# Patient Record
Sex: FEMALE | Race: WHITE | NOT HISPANIC OR LATINO | Employment: FULL TIME | URBAN - NONMETROPOLITAN AREA
[De-identification: names, ages, dates, MRNs, and addresses within clinical notes are randomized per-mention and may not be internally consistent; named-entity substitution may affect disease eponyms.]

---
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
## 2019-01-08 ENCOUNTER — TRANSCRIBE ORDERS (OUTPATIENT)
Dept: ADMINISTRATIVE | Facility: HOSPITAL | Age: 19
End: 2019-01-08

## 2019-01-08 ENCOUNTER — APPOINTMENT (OUTPATIENT)
Dept: LAB | Facility: HOSPITAL | Age: 19
End: 2019-01-08
Payer: COMMERCIAL

## 2019-01-08 DIAGNOSIS — D69.6 THROMBOCYTOPENIA, UNSPECIFIED (HCC): ICD-10-CM

## 2019-01-08 DIAGNOSIS — T50.905A DRUG-INDUCED IMMUNE THROMBOCYTOPENIA: ICD-10-CM

## 2019-01-08 DIAGNOSIS — T50.905A DRUG-INDUCED IMMUNE THROMBOCYTOPENIA: Primary | ICD-10-CM

## 2019-01-08 DIAGNOSIS — D69.6 GESTATIONAL THROMBOCYTOPENIA, SECOND TRIMESTER (HCC): ICD-10-CM

## 2019-01-08 DIAGNOSIS — D69.59 DRUG-INDUCED IMMUNE THROMBOCYTOPENIA: Primary | ICD-10-CM

## 2019-01-08 DIAGNOSIS — O99.112 GESTATIONAL THROMBOCYTOPENIA, SECOND TRIMESTER (HCC): ICD-10-CM

## 2019-01-08 DIAGNOSIS — T50.905A ACIDIFYING AGENTS CAUSING ADVERSE EFFECT IN THERAPEUTIC USE: ICD-10-CM

## 2019-01-08 DIAGNOSIS — D69.59 DRUG-INDUCED IMMUNE THROMBOCYTOPENIA: ICD-10-CM

## 2019-01-08 LAB
BASOPHILS # BLD AUTO: 0.04 THOUSANDS/ΜL (ref 0–0.1)
BASOPHILS NFR BLD AUTO: 0 % (ref 0–1)
EOSINOPHIL # BLD AUTO: 0.14 THOUSAND/ΜL (ref 0–0.61)
EOSINOPHIL NFR BLD AUTO: 1 % (ref 0–6)
ERYTHROCYTE [DISTWIDTH] IN BLOOD BY AUTOMATED COUNT: 12.6 % (ref 11.6–15.1)
HCT VFR BLD AUTO: 36.2 % (ref 34.8–46.1)
HGB BLD-MCNC: 12.1 G/DL (ref 11.5–15.4)
IMM GRANULOCYTES # BLD AUTO: 0.08 THOUSAND/UL (ref 0–0.2)
IMM GRANULOCYTES NFR BLD AUTO: 1 % (ref 0–2)
LYMPHOCYTES # BLD AUTO: 2.63 THOUSANDS/ΜL (ref 0.6–4.47)
LYMPHOCYTES NFR BLD AUTO: 19 % (ref 14–44)
MCH RBC QN AUTO: 28.7 PG (ref 26.8–34.3)
MCHC RBC AUTO-ENTMCNC: 33.4 G/DL (ref 31.4–37.4)
MCV RBC AUTO: 86 FL (ref 82–98)
MONOCYTES # BLD AUTO: 0.81 THOUSAND/ΜL (ref 0.17–1.22)
MONOCYTES NFR BLD AUTO: 6 % (ref 4–12)
NEUTROPHILS # BLD AUTO: 10.09 THOUSANDS/ΜL (ref 1.85–7.62)
NEUTS SEG NFR BLD AUTO: 73 % (ref 43–75)
NRBC BLD AUTO-RTO: 0 /100 WBCS
PLATELET # BLD AUTO: 122 THOUSANDS/UL (ref 149–390)
PMV BLD AUTO: 12.6 FL (ref 8.9–12.7)
RBC # BLD AUTO: 4.22 MILLION/UL (ref 3.81–5.12)
WBC # BLD AUTO: 13.79 THOUSAND/UL (ref 4.31–10.16)

## 2019-01-08 PROCEDURE — 85025 COMPLETE CBC W/AUTO DIFF WBC: CPT

## 2019-01-08 PROCEDURE — 36415 COLL VENOUS BLD VENIPUNCTURE: CPT

## 2019-01-24 ENCOUNTER — HOSPITAL ENCOUNTER (EMERGENCY)
Facility: HOSPITAL | Age: 19
Discharge: HOME | End: 2019-01-24
Attending: EMERGENCY MEDICINE | Admitting: EMERGENCY MEDICINE
Payer: COMMERCIAL

## 2019-01-24 ENCOUNTER — APPOINTMENT (EMERGENCY)
Dept: ULTRASOUND IMAGING | Facility: HOSPITAL | Age: 19
End: 2019-01-24
Payer: COMMERCIAL

## 2019-01-24 VITALS
SYSTOLIC BLOOD PRESSURE: 140 MMHG | OXYGEN SATURATION: 98 % | DIASTOLIC BLOOD PRESSURE: 87 MMHG | HEIGHT: 64 IN | HEART RATE: 97 BPM | TEMPERATURE: 98.5 F | WEIGHT: 243.83 LBS | RESPIRATION RATE: 18 BRPM | BODY MASS INDEX: 41.63 KG/M2

## 2019-01-24 DIAGNOSIS — R10.9 ABDOMINAL PAIN: ICD-10-CM

## 2019-01-24 DIAGNOSIS — N39.0 UTI (URINARY TRACT INFECTION): Primary | ICD-10-CM

## 2019-01-24 LAB
ALBUMIN SERPL BCP-MCNC: 3 G/DL (ref 3.5–5)
ALP SERPL-CCNC: 99 U/L (ref 46–384)
ALT SERPL W P-5'-P-CCNC: 16 U/L (ref 12–78)
AMORPH PHOS CRY URNS QL MICRO: ABNORMAL /HPF
ANION GAP SERPL CALCULATED.3IONS-SCNC: 11 MMOL/L (ref 4–13)
APTT PPP: 30 SECONDS (ref 26–38)
AST SERPL W P-5'-P-CCNC: 8 U/L (ref 5–45)
ATRIAL RATE: 89 BPM
BACTERIA UR QL AUTO: ABNORMAL /HPF
BASOPHILS # BLD AUTO: 0.02 THOUSANDS/ΜL (ref 0–0.1)
BASOPHILS NFR BLD AUTO: 0 % (ref 0–1)
BILIRUB SERPL-MCNC: 0.2 MG/DL (ref 0.2–1)
BILIRUB UR QL STRIP: NEGATIVE
BUN SERPL-MCNC: 8 MG/DL (ref 5–25)
CALCIUM SERPL-MCNC: 8.7 MG/DL (ref 8.3–10.1)
CHLORIDE SERPL-SCNC: 102 MMOL/L (ref 100–108)
CLARITY UR: ABNORMAL
CO2 SERPL-SCNC: 26 MMOL/L (ref 21–32)
COLOR UR: YELLOW
CREAT SERPL-MCNC: 0.52 MG/DL (ref 0.6–1.3)
EOSINOPHIL # BLD AUTO: 0.11 THOUSAND/ΜL (ref 0–0.61)
EOSINOPHIL NFR BLD AUTO: 1 % (ref 0–6)
ERYTHROCYTE [DISTWIDTH] IN BLOOD BY AUTOMATED COUNT: 12.9 % (ref 11.6–15.1)
GFR SERPL CREATININE-BSD FRML MDRD: 139 ML/MIN/1.73SQ M
GLUCOSE SERPL-MCNC: 85 MG/DL (ref 65–140)
GLUCOSE UR STRIP-MCNC: NEGATIVE MG/DL
HCT VFR BLD AUTO: 38.3 % (ref 34.8–46.1)
HGB BLD-MCNC: 12.6 G/DL (ref 11.5–15.4)
HGB UR QL STRIP.AUTO: ABNORMAL
IMM GRANULOCYTES # BLD AUTO: 0.06 THOUSAND/UL (ref 0–0.2)
IMM GRANULOCYTES NFR BLD AUTO: 1 % (ref 0–2)
INR PPP: 1.08 (ref 0.86–1.17)
KETONES UR STRIP-MCNC: NEGATIVE MG/DL
LACTATE SERPL-SCNC: 0.9 MMOL/L (ref 0.5–2)
LEUKOCYTE ESTERASE UR QL STRIP: ABNORMAL
LIPASE SERPL-CCNC: 179 U/L (ref 73–393)
LYMPHOCYTES # BLD AUTO: 2.03 THOUSANDS/ΜL (ref 0.6–4.47)
LYMPHOCYTES NFR BLD AUTO: 18 % (ref 14–44)
MAGNESIUM SERPL-MCNC: 1.7 MG/DL (ref 1.6–2.6)
MCH RBC QN AUTO: 28.3 PG (ref 26.8–34.3)
MCHC RBC AUTO-ENTMCNC: 32.9 G/DL (ref 31.4–37.4)
MCV RBC AUTO: 86 FL (ref 82–98)
MONOCYTES # BLD AUTO: 0.55 THOUSAND/ΜL (ref 0.17–1.22)
MONOCYTES NFR BLD AUTO: 5 % (ref 4–12)
NEUTROPHILS # BLD AUTO: 8.32 THOUSANDS/ΜL (ref 1.85–7.62)
NEUTS SEG NFR BLD AUTO: 75 % (ref 43–75)
NITRITE UR QL STRIP: NEGATIVE
NON-SQ EPI CELLS URNS QL MICRO: ABNORMAL /HPF
NRBC BLD AUTO-RTO: 0 /100 WBCS
P AXIS: 42 DEGREES
PH UR STRIP.AUTO: 7 [PH] (ref 4.5–8)
PLATELET # BLD AUTO: 126 THOUSANDS/UL (ref 149–390)
PMV BLD AUTO: 12.9 FL (ref 8.9–12.7)
POTASSIUM SERPL-SCNC: 3.7 MMOL/L (ref 3.5–5.3)
PR INTERVAL: 140 MS
PROT SERPL-MCNC: 6.8 G/DL (ref 6.4–8.2)
PROT UR STRIP-MCNC: NEGATIVE MG/DL
PROTHROMBIN TIME: 13.5 SECONDS (ref 11.8–14.2)
QRS AXIS: 2 DEGREES
QRSD INTERVAL: 100 MS
QT INTERVAL: 384 MS
QTC INTERVAL: 467 MS
RBC # BLD AUTO: 4.45 MILLION/UL (ref 3.81–5.12)
RBC #/AREA URNS AUTO: ABNORMAL /HPF
SODIUM SERPL-SCNC: 139 MMOL/L (ref 136–145)
SP GR UR STRIP.AUTO: 1.02 (ref 1–1.03)
T WAVE AXIS: 21 DEGREES
TROPONIN I SERPL-MCNC: <0.02 NG/ML
UROBILINOGEN UR QL STRIP.AUTO: 0.2 E.U./DL
VENTRICULAR RATE: 89 BPM
WBC # BLD AUTO: 11.09 THOUSAND/UL (ref 4.31–10.16)
WBC #/AREA URNS AUTO: ABNORMAL /HPF

## 2019-01-24 PROCEDURE — 93010 ELECTROCARDIOGRAM REPORT: CPT | Performed by: INTERNAL MEDICINE

## 2019-01-24 PROCEDURE — 80053 COMPREHEN METABOLIC PANEL: CPT | Performed by: PHYSICIAN ASSISTANT

## 2019-01-24 PROCEDURE — 99284 EMERGENCY DEPT VISIT MOD MDM: CPT

## 2019-01-24 PROCEDURE — 83735 ASSAY OF MAGNESIUM: CPT | Performed by: PHYSICIAN ASSISTANT

## 2019-01-24 PROCEDURE — 83690 ASSAY OF LIPASE: CPT | Performed by: PHYSICIAN ASSISTANT

## 2019-01-24 PROCEDURE — 85025 COMPLETE CBC W/AUTO DIFF WBC: CPT | Performed by: PHYSICIAN ASSISTANT

## 2019-01-24 PROCEDURE — 84484 ASSAY OF TROPONIN QUANT: CPT | Performed by: PHYSICIAN ASSISTANT

## 2019-01-24 PROCEDURE — 85610 PROTHROMBIN TIME: CPT | Performed by: PHYSICIAN ASSISTANT

## 2019-01-24 PROCEDURE — 93005 ELECTROCARDIOGRAM TRACING: CPT

## 2019-01-24 PROCEDURE — 76705 ECHO EXAM OF ABDOMEN: CPT

## 2019-01-24 PROCEDURE — 83605 ASSAY OF LACTIC ACID: CPT | Performed by: PHYSICIAN ASSISTANT

## 2019-01-24 PROCEDURE — 81001 URINALYSIS AUTO W/SCOPE: CPT | Performed by: PHYSICIAN ASSISTANT

## 2019-01-24 PROCEDURE — 85730 THROMBOPLASTIN TIME PARTIAL: CPT | Performed by: PHYSICIAN ASSISTANT

## 2019-01-24 RX ORDER — MELATONIN
1000 DAILY
COMMUNITY

## 2019-01-24 RX ORDER — CEPHALEXIN 500 MG/1
500 CAPSULE ORAL 4 TIMES DAILY
Qty: 28 CAPSULE | Refills: 0 | Status: SHIPPED | OUTPATIENT
Start: 2019-01-24 | End: 2019-01-31

## 2019-01-24 RX ORDER — FERROUS SULFATE 325(65) MG
325 TABLET ORAL
COMMUNITY

## 2019-01-24 RX ORDER — ACETAMINOPHEN 325 MG/1
650 TABLET ORAL ONCE
Status: COMPLETED | OUTPATIENT
Start: 2019-01-24 | End: 2019-01-24

## 2019-01-24 RX ORDER — SODIUM CHLORIDE 9 MG/ML
125 INJECTION, SOLUTION INTRAVENOUS CONTINUOUS
Status: DISCONTINUED | OUTPATIENT
Start: 2019-01-24 | End: 2019-01-24 | Stop reason: HOSPADM

## 2019-01-24 RX ORDER — CEPHALEXIN 250 MG/1
500 CAPSULE ORAL ONCE
Status: COMPLETED | OUTPATIENT
Start: 2019-01-24 | End: 2019-01-24

## 2019-01-24 RX ADMIN — ACETAMINOPHEN 650 MG: 325 TABLET, FILM COATED ORAL at 17:34

## 2019-01-24 RX ADMIN — SODIUM CHLORIDE 1000 ML: 0.9 INJECTION, SOLUTION INTRAVENOUS at 18:38

## 2019-01-24 RX ADMIN — SODIUM CHLORIDE 125 ML/HR: 0.9 INJECTION, SOLUTION INTRAVENOUS at 17:34

## 2019-01-24 RX ADMIN — CEPHALEXIN 500 MG: 250 CAPSULE ORAL at 18:44

## 2019-01-24 NOTE — DISCHARGE INSTRUCTIONS

## 2019-01-24 NOTE — ED PROCEDURE NOTE
Procedure  ECG 12 Lead Documentation  Date/Time: 1/24/2019 5:29 PM  Performed by: Gilmer Cogan  Authorized by: Yashira HOPKINS     Indications / Diagnosis:  Abd pain  ECG reviewed by me, the ED Provider: yes    Patient location:  ED  Previous ECG:     Comparison to cardiac monitor: Yes    Interpretation:     Interpretation: normal    Rate:     ECG rate:  89    ECG rate assessment: normal    Rhythm:     Rhythm: sinus rhythm    Ectopy:     Ectopy: none    QRS:     QRS axis:  Normal    QRS intervals:  Normal  Conduction:     Conduction: normal    ST segments:     ST segments:  Normal  T waves:     T waves: normal                       Alma Orourke PA-C  01/24/19 1729

## 2019-02-13 LAB
6MAM UR QL: NEGATIVE NG/ML
AMPHETAMINES UR QL: NEGATIVE NG/ML
BARBITURATES UR QL: NEGATIVE NG/ML
BENZODIAZ UR QL: NEGATIVE NG/ML
BENZODIAZ UR SCN-MCNC: NORMAL NG/ML
BZE UR QL: NEGATIVE NG/ML
CREAT UR-MCNC: 52.4 MG/DL
ETHANOL UR QL: NEGATIVE NG/ML
METHADONE UR QL: NEGATIVE NG/ML
OPIATES UR QL: NEGATIVE NG/ML
OXIDANTS UR QL: NEGATIVE MCG/ML
OXYCODONE UR QL: NEGATIVE NG/ML
PCP UR QL: NEGATIVE NG/ML
PH UR: 7.12 [PH] (ref 4.5–9)
SL AMB MEDMATCH 6 ACETYLMORPHINE: NORMAL
SL AMB MEDMATCH ALCOHOL METAB: NORMAL
SL AMB MEDMATCH AMPTHETAMINES: NORMAL
SL AMB MEDMATCH BARBITUATES: NORMAL
SL AMB MEDMATCH COCAINE METABOLITE: NORMAL
SL AMB MEDMATCH MARIJUANA METABOLITE: NORMAL
SL AMB MEDMATCH METHADONE METABOLITE: NORMAL
SL AMB MEDMATCH OPIATES: NORMAL
SL AMB MEDMATCH OXYCODONE: NORMAL
SL AMB MEDMATCH PHENCYCLIDINE: NORMAL
THC UR QL: NEGATIVE NG/ML

## 2019-02-21 ENCOUNTER — TRANSCRIBE ORDERS (OUTPATIENT)
Dept: ADMINISTRATIVE | Facility: HOSPITAL | Age: 19
End: 2019-02-21

## 2019-02-21 ENCOUNTER — APPOINTMENT (OUTPATIENT)
Dept: LAB | Facility: HOSPITAL | Age: 19
End: 2019-02-21
Payer: COMMERCIAL

## 2019-02-21 DIAGNOSIS — D69.6 BENIGN GESTATIONAL THROMBOCYTOPENIA IN SECOND TRIMESTER (HCC): ICD-10-CM

## 2019-02-21 DIAGNOSIS — T50.905A DRUG-INDUCED IMMUNE THROMBOCYTOPENIA: ICD-10-CM

## 2019-02-21 DIAGNOSIS — D69.59 DRUG-INDUCED IMMUNE THROMBOCYTOPENIA: ICD-10-CM

## 2019-02-21 DIAGNOSIS — D69.59 DRUG-INDUCED IMMUNE THROMBOCYTOPENIA: Primary | ICD-10-CM

## 2019-02-21 DIAGNOSIS — T50.905A DRUG-INDUCED IMMUNE THROMBOCYTOPENIA: Primary | ICD-10-CM

## 2019-02-21 DIAGNOSIS — O99.112 BENIGN GESTATIONAL THROMBOCYTOPENIA IN SECOND TRIMESTER (HCC): ICD-10-CM

## 2019-02-21 LAB
BASOPHILS # BLD AUTO: 0.03 THOUSANDS/ΜL (ref 0–0.1)
BASOPHILS NFR BLD AUTO: 0 % (ref 0–1)
EOSINOPHIL # BLD AUTO: 0.15 THOUSAND/ΜL (ref 0–0.61)
EOSINOPHIL NFR BLD AUTO: 1 % (ref 0–6)
ERYTHROCYTE [DISTWIDTH] IN BLOOD BY AUTOMATED COUNT: 12.7 % (ref 11.6–15.1)
HCT VFR BLD AUTO: 35.9 % (ref 34.8–46.1)
HGB BLD-MCNC: 11.9 G/DL (ref 11.5–15.4)
IMM GRANULOCYTES # BLD AUTO: 0.08 THOUSAND/UL (ref 0–0.2)
IMM GRANULOCYTES NFR BLD AUTO: 1 % (ref 0–2)
LYMPHOCYTES # BLD AUTO: 2.53 THOUSANDS/ΜL (ref 0.6–4.47)
LYMPHOCYTES NFR BLD AUTO: 17 % (ref 14–44)
MCH RBC QN AUTO: 28.7 PG (ref 26.8–34.3)
MCHC RBC AUTO-ENTMCNC: 33.1 G/DL (ref 31.4–37.4)
MCV RBC AUTO: 87 FL (ref 82–98)
MONOCYTES # BLD AUTO: 0.83 THOUSAND/ΜL (ref 0.17–1.22)
MONOCYTES NFR BLD AUTO: 6 % (ref 4–12)
NEUTROPHILS # BLD AUTO: 11.5 THOUSANDS/ΜL (ref 1.85–7.62)
NEUTS SEG NFR BLD AUTO: 75 % (ref 43–75)
NRBC BLD AUTO-RTO: 0 /100 WBCS
PLATELET # BLD AUTO: 151 THOUSANDS/UL (ref 149–390)
PMV BLD AUTO: 12.2 FL (ref 8.9–12.7)
RBC # BLD AUTO: 4.14 MILLION/UL (ref 3.81–5.12)
WBC # BLD AUTO: 15.12 THOUSAND/UL (ref 4.31–10.16)

## 2019-02-21 PROCEDURE — 85025 COMPLETE CBC W/AUTO DIFF WBC: CPT

## 2019-02-21 PROCEDURE — 36415 COLL VENOUS BLD VENIPUNCTURE: CPT

## 2019-02-27 ENCOUNTER — HOSPITAL ENCOUNTER (EMERGENCY)
Facility: HOSPITAL | Age: 19
Discharge: HOME | End: 2019-02-27
Attending: EMERGENCY MEDICINE | Admitting: EMERGENCY MEDICINE
Payer: COMMERCIAL

## 2019-02-27 VITALS
HEART RATE: 110 BPM | BODY MASS INDEX: 43.58 KG/M2 | OXYGEN SATURATION: 94 % | RESPIRATION RATE: 18 BRPM | DIASTOLIC BLOOD PRESSURE: 80 MMHG | HEIGHT: 64 IN | SYSTOLIC BLOOD PRESSURE: 144 MMHG | TEMPERATURE: 97.8 F | WEIGHT: 255.29 LBS

## 2019-02-27 DIAGNOSIS — Z34.93 THIRD TRIMESTER PREGNANCY: ICD-10-CM

## 2019-02-27 DIAGNOSIS — J00 NASOPHARYNGITIS: Primary | ICD-10-CM

## 2019-02-27 LAB — S PYO AG THROAT QL: NEGATIVE

## 2019-02-27 PROCEDURE — 87430 STREP A AG IA: CPT | Performed by: PHYSICIAN ASSISTANT

## 2019-02-27 PROCEDURE — 87070 CULTURE OTHR SPECIMN AEROBIC: CPT | Performed by: PHYSICIAN ASSISTANT

## 2019-02-27 PROCEDURE — 99283 EMERGENCY DEPT VISIT LOW MDM: CPT

## 2019-02-27 RX ORDER — ACETAMINOPHEN 325 MG/1
650 TABLET ORAL EVERY 6 HOURS PRN
Qty: 30 TABLET | Refills: 0 | Status: SHIPPED | OUTPATIENT
Start: 2019-02-27

## 2019-02-27 RX ORDER — FLUTICASONE PROPIONATE 50 MCG
2 SPRAY, SUSPENSION (ML) NASAL DAILY
Qty: 16 G | Refills: 0 | Status: SHIPPED | OUTPATIENT
Start: 2019-02-27

## 2019-02-27 NOTE — ED PROVIDER NOTES
History  Chief Complaint   Patient presents with    Sore Throat     Sore throat x2 days worse on L associated with cough -  productive, green  No n/v/d, abd pain  History provided by:  Patient  Sore Throat   Location:  Generalized  Quality:  Sore  Severity:  Moderate  Onset quality:  Gradual  Duration:  2 days  Timing:  Constant  Progression:  Unchanged  Chronicity:  New  Relieved by:  Nothing  Worsened by:  Swallowing  Ineffective treatments:  Acetaminophen  Associated symptoms: cough and postnasal drip    Associated symptoms: no abdominal pain, no adenopathy, no chest pain, no chills, no drooling, no ear discharge, no ear pain, no epistaxis, no eye discharge, no fever, no headaches, no neck stiffness, no night sweats, no plugged ear sensation, no rash, no rhinorrhea, no shortness of breath, no sinus congestion, no stridor, no trouble swallowing and no voice change    Cough:     Cough characteristics:  Productive    Sputum characteristics:  Yellow    Severity:  Mild    Duration:  1 day    Timing:  Intermittent    Progression:  Unchanged    Chronicity:  New  Risk factors: exposure to strep and sick contacts (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 Taking?    Prenatal Vit-Fe Fumarate-FA (PRENATAL COMPLETE) 14-0 4 MG TABS   No Yes   Sig: Take 1 tablet by mouth daily   cholecalciferol (VITAMIN D3) 1,000 units tablet  Self Yes Yes   Sig: Take 1,000 Units by mouth daily   ferrous sulfate 325 (65 Fe) mg tablet  Self Yes Yes   Sig: Take 325 mg by mouth daily with breakfast      Facility-Administered Medications: None       Past Medical History:   Diagnosis Date    History of ITP        Past Surgical History:   Procedure Laterality Date    FRACTURE SURGERY      left thumb       History reviewed  No pertinent family history  I have reviewed and agree with the history as documented  Social History     Tobacco Use    Smoking status: Former Smoker     Packs/day: 1 00     Types: Cigarettes     Last attempt to quit: 10/24/2018     Years since quittin 3    Smokeless tobacco: Never Used   Substance Use Topics    Alcohol use: No    Drug use: No        Review of Systems   Constitutional: Negative for activity change, appetite change, chills, fever and night sweats  HENT: Positive for congestion, postnasal drip and sore throat  Negative for drooling, ear discharge, ear pain, nosebleeds, rhinorrhea, tinnitus, trouble swallowing and voice change  Eyes: Negative for pain, discharge and visual disturbance  Respiratory: Positive for cough  Negative for choking, chest tightness, shortness of breath, wheezing and stridor  Cardiovascular: Negative for chest pain and leg swelling  Gastrointestinal: Negative for abdominal pain, diarrhea and vomiting  Genitourinary: Negative for decreased urine volume and difficulty urinating  Musculoskeletal: Negative for back pain, gait problem and neck stiffness  Skin: Negative for rash and wound  Allergic/Immunologic: Negative for immunocompromised state  Neurological: Negative for dizziness and headaches  Hematological: Negative for adenopathy  Physical Exam  Physical Exam   Constitutional: She appears well-developed and well-nourished  Non-toxic appearance  She does not appear ill  No distress  obese   HENT:   Head: Normocephalic and atraumatic  Right Ear: Hearing, tympanic membrane and ear canal normal  No drainage or swelling  Left Ear: Hearing, tympanic membrane and ear canal normal  No drainage or swelling  Mouth/Throat: Uvula is midline and mucous membranes are normal  No oropharyngeal exudate, posterior oropharyngeal edema, posterior oropharyngeal erythema or tonsillar abscesses   Tonsils are 1+ on the right  Tonsils are 1+ on the left  No tonsillar exudate  Eyes: Pupils are equal, round, and reactive to light  Neck: Neck supple  No thyromegaly present  Cardiovascular: Normal rate, regular rhythm, normal heart sounds and intact distal pulses  No murmur heard  Pulmonary/Chest: Effort normal and breath sounds normal  No respiratory distress  She exhibits no tenderness  Abdominal: Soft  Bowel sounds are normal    Gravid uterus above umbilicus   Lymphadenopathy:     She has no cervical adenopathy  Neurological: She is alert  Skin: Skin is warm and dry  Capillary refill takes less than 2 seconds  No rash noted  She is not diaphoretic  No erythema  Psychiatric: She has a normal mood and affect  Nursing note and vitals reviewed  Vital Signs  ED Triage Vitals [02/27/19 1142]   Temperature Pulse Respirations Blood Pressure SpO2   97 8 °F (36 6 °C) (!) 110 18 144/80 94 %      Temp Source Heart Rate Source Patient Position - Orthostatic VS BP Location FiO2 (%)   Temporal Monitor Sitting Right arm --      Pain Score       7           Vitals:    02/27/19 1142   BP: 144/80   Pulse: (!) 110   Patient Position - Orthostatic VS: Sitting       Visual Acuity      ED Medications  Medications - No data to display    Diagnostic Studies  Results Reviewed     Procedure Component Value Units Date/Time    Rapid Strep A Screen Throat with Reflex to Culture, Pediatrics and Compromised Adults [[id number]]  (Normal) Collected:  02/27/19 1208    Lab Status:  Final result Specimen:  Throat Updated:  02/27/19 1221     Rapid Strep A Screen Negative    Throat culture [[id number]] Collected:  02/27/19 1208    Lab Status:   In process Specimen:  Throat Updated:  02/27/19 1220                 No orders to display              Procedures  Procedures       Phone Contacts  ED Phone Contact    ED Course  ED Course as of Feb 27 1426 Wed Feb 27, 2019   1227 RAPID STREP A SCREEN: Negative                               MDM  Number of Diagnoses or Management Options  Nasopharyngitis: new and does not require workup  Third trimester pregnancy: established and improving     Amount and/or Complexity of Data Reviewed  Clinical lab tests: ordered and reviewed    Risk of Complications, Morbidity, and/or Mortality  Presenting problems: low  Diagnostic procedures: low  Management options: low    Patient Progress  Patient progress: stable      Disposition  Final diagnoses:   Nasopharyngitis   Third trimester pregnancy     Time reflects when diagnosis was documented in both MDM as applicable and the Disposition within this note     Time User Action Codes Description Comment    2/27/2019 12:27 PM Pramod Worrell Add [J00] Nasopharyngitis     2/27/2019 12:27 PM Pramod Worrell Add [Z34 93] Third trimester pregnancy       ED Disposition     ED Disposition Condition Date/Time Comment    Discharge Good Wed Feb 27, 2019 12:27 PM Arely Mcfarland discharge to home/self care              Follow-up Information     Follow up With Specialties Details Why [facility] Obstetrics and Gynecology Schedule an appointment as soon as possible for a visit  OBGYN followup [facility] [address]  [phone]            Discharge Medication List as of 2/27/2019 12:32 PM      START taking these medications    Details   acetaminophen (TYLENOL) 325 mg tablet Take 2 tablets (650 mg total) by mouth every 6 (six) hours as needed (pain/fever), Starting Wed 2/27/2019, Normal      fluticasone (FLONASE) 50 mcg/act nasal spray 2 sprays into each nostril daily, Starting Wed 2/27/2019, Normal         CONTINUE these medications which have NOT CHANGED    Details   cholecalciferol (VITAMIN D3) 1,000 units tablet Take 1,000 Units by mouth daily, Historical Med      ferrous sulfate 325 (65 Fe) mg tablet Take 325 mg by mouth daily with breakfast, Historical Med      Prenatal Vit-Fe Fumarate-FA (PRENATAL COMPLETE) 14-0 4 MG TABS Take 1 tablet by mouth daily, Starting Sun 10/14/2018, Print           No discharge procedures on file      ED Provider  Electronically Signed by           Sukumar Griffin PA-C  02/27/19 4962

## 2019-03-01 LAB — BACTERIA THROAT CULT: NORMAL

## 2019-04-09 ENCOUNTER — TRANSCRIBE ORDERS (OUTPATIENT)
Dept: ADMINISTRATIVE | Facility: HOSPITAL | Age: 19
End: 2019-04-09

## 2019-04-09 ENCOUNTER — APPOINTMENT (OUTPATIENT)
Dept: LAB | Facility: HOSPITAL | Age: 19
End: 2019-04-09
Payer: COMMERCIAL

## 2019-04-09 DIAGNOSIS — D69.3 IDIOPATHIC THROMBOCYTOPENIA PURPURA (HCC): ICD-10-CM

## 2019-04-09 DIAGNOSIS — D69.3 IDIOPATHIC THROMBOCYTOPENIA PURPURA (HCC): Primary | ICD-10-CM

## 2019-04-09 LAB
ERYTHROCYTE [DISTWIDTH] IN BLOOD BY AUTOMATED COUNT: 12.8 % (ref 11.6–15.1)
HCT VFR BLD AUTO: 33.1 % (ref 34.8–46.1)
HGB BLD-MCNC: 10.9 G/DL (ref 11.5–15.4)
MCH RBC QN AUTO: 27.5 PG (ref 26.8–34.3)
MCHC RBC AUTO-ENTMCNC: 32.9 G/DL (ref 31.4–37.4)
MCV RBC AUTO: 84 FL (ref 82–98)
PLATELET # BLD AUTO: 182 THOUSANDS/UL (ref 149–390)
PMV BLD AUTO: 11.5 FL (ref 8.9–12.7)
RBC # BLD AUTO: 3.96 MILLION/UL (ref 3.81–5.12)
WBC # BLD AUTO: 13.6 THOUSAND/UL (ref 4.31–10.16)

## 2019-04-09 PROCEDURE — 85027 COMPLETE CBC AUTOMATED: CPT

## 2019-04-09 PROCEDURE — 36415 COLL VENOUS BLD VENIPUNCTURE: CPT

## 2019-05-27 ENCOUNTER — HOSPITAL ENCOUNTER (EMERGENCY)
Facility: HOSPITAL | Age: 19
Discharge: HOME | End: 2019-05-27
Attending: EMERGENCY MEDICINE | Admitting: EMERGENCY MEDICINE
Payer: COMMERCIAL

## 2019-05-27 VITALS
SYSTOLIC BLOOD PRESSURE: 154 MMHG | TEMPERATURE: 97.4 F | DIASTOLIC BLOOD PRESSURE: 85 MMHG | HEIGHT: 64 IN | BODY MASS INDEX: 43.66 KG/M2 | WEIGHT: 255.73 LBS | HEART RATE: 102 BPM | OXYGEN SATURATION: 98 % | RESPIRATION RATE: 16 BRPM

## 2019-05-27 DIAGNOSIS — H65.90 SEROUS OTITIS MEDIA: Primary | ICD-10-CM

## 2019-05-27 PROCEDURE — 99282 EMERGENCY DEPT VISIT SF MDM: CPT

## 2019-05-27 PROCEDURE — 99283 EMERGENCY DEPT VISIT LOW MDM: CPT | Performed by: PHYSICIAN ASSISTANT

## 2019-05-27 RX ORDER — AMOXICILLIN 500 MG/1
500 CAPSULE ORAL EVERY 8 HOURS SCHEDULED
Qty: 21 CAPSULE | Refills: 0 | Status: SHIPPED | OUTPATIENT
Start: 2019-05-27 | End: 2019-06-03

## 2019-05-27 RX ORDER — AMOXICILLIN 250 MG/1
500 CAPSULE ORAL ONCE
Status: COMPLETED | OUTPATIENT
Start: 2019-05-27 | End: 2019-05-27

## 2019-05-27 RX ADMIN — AMOXICILLIN 500 MG: 250 CAPSULE ORAL at 19:36

## 2019-09-27 ENCOUNTER — HOSPITAL ENCOUNTER (EMERGENCY)
Age: 19
Discharge: HOME | End: 2019-09-28
Payer: MEDICAID

## 2019-09-27 VITALS
DIASTOLIC BLOOD PRESSURE: 92 MMHG | HEIGHT: 64 IN | RESPIRATION RATE: 16 BRPM | WEIGHT: 240 LBS | HEART RATE: 95 BPM | BODY MASS INDEX: 40.97 KG/M2 | TEMPERATURE: 98.7 F | OXYGEN SATURATION: 99 % | SYSTOLIC BLOOD PRESSURE: 167 MMHG

## 2019-09-27 DIAGNOSIS — K02.9 PAIN DUE TO DENTAL CARIES: Primary | ICD-10-CM

## 2019-09-27 DIAGNOSIS — J02.9 ACUTE PHARYNGITIS, UNSPECIFIED ETIOLOGY: ICD-10-CM

## 2019-09-27 DIAGNOSIS — R03.0 ELEVATED BLOOD PRESSURE READING: ICD-10-CM

## 2019-09-27 PROCEDURE — 99282 EMERGENCY DEPT VISIT SF MDM: CPT

## 2019-09-27 RX ORDER — AMOXICILLIN AND CLAVULANATE POTASSIUM 875; 125 MG/1; MG/1
1 TABLET, FILM COATED ORAL 2 TIMES DAILY WITH MEALS
Qty: 20 TABLET | Refills: 0 | Status: SHIPPED | OUTPATIENT
Start: 2019-09-27 | End: 2019-10-07

## 2019-09-27 RX ORDER — HYDROCODONE BITARTRATE AND ACETAMINOPHEN 5; 325 MG/1; MG/1
1 TABLET ORAL EVERY 8 HOURS PRN
Qty: 9 TABLET | Refills: 0 | Status: SHIPPED | OUTPATIENT
Start: 2019-09-27 | End: 2019-09-30

## 2019-09-27 SDOH — HEALTH STABILITY: MENTAL HEALTH: HOW OFTEN DO YOU HAVE A DRINK CONTAINING ALCOHOL?: NEVER

## 2019-09-27 ASSESSMENT — PAIN DESCRIPTION - PROGRESSION: CLINICAL_PROGRESSION: NOT CHANGED

## 2019-09-27 ASSESSMENT — PAIN DESCRIPTION - ORIENTATION: ORIENTATION: MID

## 2019-09-27 ASSESSMENT — PAIN DESCRIPTION - LOCATION: LOCATION: THROAT

## 2019-09-27 ASSESSMENT — PAIN SCALES - GENERAL: PAINLEVEL_OUTOF10: 8

## 2019-09-27 ASSESSMENT — PAIN DESCRIPTION - DESCRIPTORS: DESCRIPTORS: BURNING

## 2019-09-27 ASSESSMENT — PAIN DESCRIPTION - PAIN TYPE: TYPE: ACUTE PAIN

## 2020-03-11 ENCOUNTER — INITIAL PRENATAL (OUTPATIENT)
Dept: OBGYN CLINIC | Age: 20
End: 2020-03-11
Payer: MEDICAID

## 2020-03-11 VITALS
SYSTOLIC BLOOD PRESSURE: 139 MMHG | WEIGHT: 262.13 LBS | BODY MASS INDEX: 44.99 KG/M2 | HEART RATE: 97 BPM | DIASTOLIC BLOOD PRESSURE: 85 MMHG

## 2020-03-11 PROBLEM — Z3A.21 21 WEEKS GESTATION OF PREGNANCY: Status: ACTIVE | Noted: 2020-03-11

## 2020-03-11 PROBLEM — O99.119 ALLOIMMUNE THROMBOCYTOPENIA OF MOTHER DURING PREGNANCY: Status: ACTIVE | Noted: 2020-03-11

## 2020-03-11 PROBLEM — D69.59 ALLOIMMUNE THROMBOCYTOPENIA OF MOTHER DURING PREGNANCY: Status: ACTIVE | Noted: 2020-03-11

## 2020-03-11 LAB
GLUCOSE URINE, POC: NORMAL
PROTEIN UA: NEGATIVE

## 2020-03-11 PROCEDURE — 99203 OFFICE O/P NEW LOW 30 MIN: CPT | Performed by: OBSTETRICS & GYNECOLOGY

## 2020-03-11 PROCEDURE — 81002 URINALYSIS NONAUTO W/O SCOPE: CPT | Performed by: OBSTETRICS & GYNECOLOGY

## 2020-03-11 PROCEDURE — 76805 OB US >/= 14 WKS SNGL FETUS: CPT | Performed by: OBSTETRICS & GYNECOLOGY

## 2020-03-11 PROCEDURE — 76816 OB US FOLLOW-UP PER FETUS: CPT | Performed by: OBSTETRICS & GYNECOLOGY

## 2020-03-11 NOTE — LETTER
[facility]  [address] [address]   Dept: [phone]  Loc: [phone]  Ishaan Suarez MD                                           [facility] Consultation Letter     3/11/20     Stevens Gaucher, MD     Re: Patient: Bob Alexandra  YOB: 2000    MR Number: [id number] Date of Visit: 3/11/2020     Dear Dr. Albertina Eaton  I had the pleasure of seeing your patient, Bob Alexandra, in consultation in the [facility] Fetal Medicine Department of [facility] [place], [place]). DOS: 3/11/20     [facility] MATERNAL FETAL MEDICINE      MATERNAL-FETAL MEDICINE CONSULT    Referring/Requesting Provider: Lily Valdez MD  [address]       CHIEF COMPLAINT:   ITP with pregnancy. HISTORY OF PRESENT ILLNESS:   Rachel Feliciano is a 20 y.o. female  at 20w0d who presents for ultrasound and consultation regarding ITP. The patient has ITP since the age of 8years of age. She was never on Prednisone therapy. The last Platelet count was 669 Thousands (in January). Rachel Feliciano denies nausea, vomiting, vaginal bleeding, leakage of fluid, contractions, and/or abdominal pain. She also denies blurring of vision, visual disturbances, headaches, and /or epigastric pain. She reports the fetus to be active.       OB HISTORY:  OB History    Para Term  AB Living   2 1 1     1   SAB TAB Ectopic Molar Multiple Live Births             1      # Outcome Date GA Lbr Farshad/2nd Weight Sex Delivery Anes PTL Lv   2 Current            1 Term 2019    M CS-LTranv  N DALLIN       PAST MEDICAL HISTORY:  Past Medical History:   Diagnosis Date    Autoimmune disorder ([address]     ITP    Disease of blood and blood forming organ     blood clotting issue    Hypertension        PAST SURGICAL HISTORY:  Past Surgical History:   Procedure Laterality Date    ARM SURGERY Left      SECTION         PERTINENT FAMILY HISTORY:  Family History   Problem Relation Age of Onset  Heart Disease Maternal Grandfather     Thyroid Disease Mother        MEDS:   Current Outpatient Rx   Medication Sig Dispense Refill    Prenatal Vit-Fe Fumarate-FA (PRENATAL VITAMINS PO) Take 1 tablet by mouth daily Causing nausea         ALLERGY:   Allergies   Allergen Reactions    Aspirin      Has ITP       REVIEW OF SYSTEMS:     A comprehensive review of systems was negative. PHYSICAL EXAM:  VITAL SIGNS: /85   Pulse 97   Wt 262 lb 2 oz (118.9 kg)   LMP 2019   BMI 44.99 kg/m²    General Appearance: Alert, appropriate appearance for age. No acute distress    IMAGING:  Please see ultrasound report for full details. LABS:    No results found for any previous visit. IMPRESSION:   Chika Ibarra is a 20 y.o. female  with ITP. 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 Monthly Platelet count  2. If the Platelet count is > 80 Thousands, no action is indicated  3. If the Platelet count is < 80 Thousands, consider Prednisone  4. She is already following with Hematology  5. Fetal testing after 32 weeks  6. Time and mode of delivery is based on obstetric indications  7.  evaluation of baby's platelet counts. The fetal/ risk of thrombocytopenia and hemorrhage is law        The total patient time of the visit was 30 minutes, of which was greater than 50% of the time was spent counseling and coordinating care. Gloria Alan MD           Thank you for allowing us to participate in the care of your patient. Should you or the family have any questions or concerns, please do not hesitate to contact us.     Sincerely,    Gloria Alan MD

## 2020-03-11 NOTE — PROGRESS NOTES
DOS: 3/11/20     [facility] MATERNAL FETAL [facility]ICINE      MATERNAL-FETAL MEDICINE CONSULT    Referring/Requesting Provider: Candida Wade MD  [facility], [address] [place] [address]       CHIEF COMPLAINT:   ITP with pregnancy. HISTORY OF PRESENT ILLNESS:   Lucien Monet is a 20 y.o. female  at 20w0d who presents for ultrasound and consultation regarding ITP. The patient has ITP since the age of 8years of age. She was never on Prednisone therapy. The last Platelet count was 960 Thousands (in January). Lucien Monet denies nausea, vomiting, vaginal bleeding, leakage of fluid, contractions, and/or abdominal pain. She also denies blurring of vision, visual disturbances, headaches, and /or epigastric pain. She reports the fetus to be active. OB HISTORY:  OB History    Para Term  AB Living   2 1 1     1   SAB TAB Ectopic Molar Multiple Live Births             1      # Outcome Date GA Lbr Farshad/2nd Weight Sex Delivery Anes PTL Lv   2 Current            1 Term 2019    M CS-LTranv  N DALLIN       PAST MEDICAL HISTORY:  Past Medical History:   Diagnosis Date    Autoimmune disorder ([facility] [address]     ITP    Disease of blood and blood forming organ     blood clotting issue    Hypertension        PAST SURGICAL HISTORY:  Past Surgical History:   Procedure Laterality Date    ARM SURGERY Left      SECTION         PERTINENT FAMILY HISTORY:  Family History   Problem Relation Age of Onset    Heart Disease Maternal Grandfather     Thyroid Disease Mother        MEDS:   Current Outpatient Rx   Medication Sig Dispense Refill    Prenatal Vit-Fe Fumarate-FA (PRENATAL VITAMINS PO) Take 1 tablet by mouth daily Causing nausea         ALLERGY:   Allergies   Allergen Reactions    Aspirin      Has ITP       REVIEW OF SYSTEMS:     A comprehensive review of systems was negative.     PHYSICAL EXAM:  VITAL SIGNS: /85   Pulse 97   Wt 262 lb 2 oz (118.9 kg)   LMP 2019   BMI 44.99 kg/m²   General Appearance: Alert, appropriate appearance for age. No acute distress    IMAGING:  Please see ultrasound report for full details. LABS:    No results found for any previous visit. IMPRESSION:   Chika Ibarra is a 20 y.o. female  with ITP. 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 Monthly Platelet count  2. If the Platelet count is > 80 Thousands, no action is indicated  3. If the Platelet count is < 80 Thousands, consider Prednisone  4. She is already following with Hematology  5. Fetal testing after 32 weeks  6. Time and mode of delivery is based on obstetric indications  7.  evaluation of baby's platelet counts. The fetal/ risk of thrombocytopenia and hemorrhage is law        The total patient time of the visit was 30 minutes, of which was greater than 50% of the time was spent counseling and coordinating care.     Gloria Alan MD

## 2020-04-01 ENCOUNTER — ROUTINE PRENATAL (OUTPATIENT)
Dept: OBGYN CLINIC | Age: 20
End: 2020-04-01
Payer: COMMERCIAL

## 2020-04-01 VITALS
DIASTOLIC BLOOD PRESSURE: 81 MMHG | WEIGHT: 266.38 LBS | TEMPERATURE: 99.8 F | BODY MASS INDEX: 45.72 KG/M2 | SYSTOLIC BLOOD PRESSURE: 121 MMHG | HEART RATE: 107 BPM

## 2020-04-01 PROBLEM — O34.219 HISTORY OF CESAREAN DELIVERY AFFECTING PREGNANCY: Status: ACTIVE | Noted: 2020-04-01

## 2020-04-01 PROBLEM — Z3A.24 24 WEEKS GESTATION OF PREGNANCY: Status: ACTIVE | Noted: 2020-04-01

## 2020-04-01 PROBLEM — Z3A.21 21 WEEKS GESTATION OF PREGNANCY: Status: RESOLVED | Noted: 2020-03-11 | Resolved: 2020-04-01

## 2020-04-01 PROCEDURE — 76816 OB US FOLLOW-UP PER FETUS: CPT | Performed by: OBSTETRICS & GYNECOLOGY

## 2020-04-01 PROCEDURE — 99213 OFFICE O/P EST LOW 20 MIN: CPT | Performed by: OBSTETRICS & GYNECOLOGY

## 2020-04-01 PROCEDURE — 99999 PR OFFICE/OUTPT VISIT,PROCEDURE ONLY: CPT | Performed by: OBSTETRICS & GYNECOLOGY

## 2020-04-01 NOTE — PATIENT INSTRUCTIONS
Patient Education        Weeks 22 to 26 of Your Pregnancy: Care Instructions  Your Care Instructions    As you enter your 7th month of pregnancy at week 26, your baby's lungs are growing stronger and getting ready to breathe. You may notice that your baby responds to the sound of your or your partner's voice. You may also notice that your baby does less turning and twisting and more squirming or jerking. Jerking often means that your baby has the hiccups. Hiccups are perfectly normal and are only temporary. You may want to think about attending a childbirth preparation class. This is also a good time to start thinking about whether you want to have pain medicine during labor. Most pregnant women are tested for gestational diabetes between weeks 25 and 28. Gestational diabetes occurs when your blood sugar level gets too high when you're pregnant. The test is important, because you can have gestational diabetes and not know it. But the condition can cause problems for your baby. Follow-up care is a key part of your treatment and safety. Be sure to make and go to all appointments, and call your doctor if you are having problems. It's also a good idea to know your test results and keep a list of the medicines you take. How can you care for yourself at home? Ease discomfort from your baby's kicking  · Change your position. Sometimes this will cause your baby to change position too. · Take a deep breath while you raise your arm over your head. Then breathe out while you drop your arm. Do Kegel exercises to prevent urine from leaking  · You can do Kegel exercises while you stand or sit. ? Squeeze the same muscles you would use to stop your urine. Your belly and thighs should not move. ? Hold the squeeze for 3 seconds, and then relax for 3 seconds. ? Start with 3 seconds. Then add 1 second each week until you are able to squeeze for 10 seconds. ? Repeat the exercise 10 to 15 times for each session.  Do three or medical care if:  · You have vaginal bleeding. · You have belly pain. · You have a fever. · You have symptoms of preeclampsia, such as:  ? Sudden swelling of your face, hands, or feet. ? New vision problems (such as dimness, blurring, or seeing spots). ? A severe headache. · You have a sudden release of fluid from your vagina. (You think your water broke.)  · You think that you may be in labor. This means that you've had at least 6 contractions in an hour. · You notice that your baby has stopped moving or is moving much less than normal.  · You have symptoms of a urinary tract infection. These may include:  ? Pain or burning when you urinate. ? A frequent need to urinate without being able to pass much urine. ? Pain in the flank, which is just below the rib cage and above the waist on either side of the back. ? Blood in your urine. Watch closely for changes in your health, and be sure to contact your doctor if:  · You have vaginal discharge that smells bad. · You have skin changes, such as:  ? A rash. ? Itching. ? Yellow color to your skin. · You have other concerns about your pregnancy. If you have labor signs at 37 weeks or more  If you have signs of labor at 37 weeks or more, your doctor may tell you to call when your labor becomes more active. Symptoms of active labor include:  · Contractions that are regular. · Contractions that are less than 5 minutes apart. · Contractions that are hard to talk through. Follow-up care is a key part of your treatment and safety. Be sure to make and go to all appointments, and call your doctor if you are having problems. It's also a good idea to know your test results and keep a list of the medicines you take. Where can you learn more? Go to https://[email or web address].healthSynker. org and sign in to your [employer] account.  Enter  in the [employer] box to learn more about \"Learning About When to Call Your Doctor During Pregnancy (After 20 Weeks). \"     If you do not have an account, please click on the \"Sign Up Now\" link. Current as of: May 29, 2019Content Version: 12.4  © 8462-7363 Healthwise, Incorporated. Care instructions adapted under license by [facility] ([facility]). If you have questions about a medical condition or this instruction, always ask your healthcare professional. [address] any warranty or liability for your use of this information.

## 2020-05-05 ENCOUNTER — HOSPITAL ENCOUNTER (OUTPATIENT)
Age: 20
Discharge: HOME | End: 2020-05-05
Payer: COMMERCIAL

## 2020-05-05 LAB
BASOPHILS ABSOLUTE: 0.02 E9/L (ref 0–0.2)
BASOPHILS RELATIVE PERCENT: 0.2 % (ref 0–2)
EOSINOPHILS ABSOLUTE: 0.06 E9/L (ref 0.05–0.5)
EOSINOPHILS RELATIVE PERCENT: 0.6 % (ref 0–6)
GLUCOSE TOLERANCE SCREEN 50G: 109 MG/DL (ref 70–140)
HCT VFR BLD CALC: 34.2 % (ref 34–48)
HEMOGLOBIN: 11.1 G/DL (ref 11.5–15.5)
IMMATURE GRANULOCYTES #: 0.1 E9/L
IMMATURE GRANULOCYTES %: 1 % (ref 0–5)
LYMPHOCYTES ABSOLUTE: 1.62 E9/L (ref 1.5–4)
LYMPHOCYTES RELATIVE PERCENT: 16.2 % (ref 20–42)
MCH RBC QN AUTO: 27.1 PG (ref 26–35)
MCHC RBC AUTO-ENTMCNC: 32.5 % (ref 32–34.5)
MCV RBC AUTO: 83.6 FL (ref 80–99.9)
MONOCYTES ABSOLUTE: 0.47 E9/L (ref 0.1–0.95)
MONOCYTES RELATIVE PERCENT: 4.7 % (ref 2–12)
NEUTROPHILS ABSOLUTE: 7.73 E9/L (ref 1.8–7.3)
NEUTROPHILS RELATIVE PERCENT: 77.3 % (ref 43–80)
PDW BLD-RTO: 12.9 FL (ref 11.5–15)
PLATELET # BLD: 132 E9/L (ref 130–450)
PMV BLD AUTO: 12.6 FL (ref 7–12)
RBC # BLD: 4.09 E12/L (ref 3.5–5.5)
WBC # BLD: 10 E9/L (ref 4.5–11.5)

## 2020-05-05 PROCEDURE — 82950 GLUCOSE TEST: CPT

## 2020-05-05 PROCEDURE — 85025 COMPLETE CBC W/AUTO DIFF WBC: CPT

## 2020-05-05 PROCEDURE — 36415 COLL VENOUS BLD VENIPUNCTURE: CPT

## 2020-06-04 ENCOUNTER — OFFICE VISIT (OUTPATIENT)
Dept: ORTHOPEDIC SURGERY | Age: 20
End: 2020-06-04
Payer: COMMERCIAL

## 2020-06-04 VITALS — WEIGHT: 266 LBS | BODY MASS INDEX: 45.41 KG/M2 | HEIGHT: 64 IN

## 2020-06-04 PROCEDURE — G8427 DOCREV CUR MEDS BY ELIG CLIN: HCPCS | Performed by: ORTHOPAEDIC SURGERY

## 2020-06-04 PROCEDURE — 99204 OFFICE O/P NEW MOD 45 MIN: CPT | Performed by: ORTHOPAEDIC SURGERY

## 2020-06-04 PROCEDURE — 4004F PT TOBACCO SCREEN RCVD TLK: CPT | Performed by: ORTHOPAEDIC SURGERY

## 2020-06-04 PROCEDURE — G8419 CALC BMI OUT NRM PARAM NOF/U: HCPCS | Performed by: ORTHOPAEDIC SURGERY

## 2020-06-04 NOTE — PROGRESS NOTES
stool, (-) gastric ulcer. Psychiatric: (-) Depression, (-) Anxiety, (-) bipolar disease, (-) Alzheimer's Disease  Allergic/Immunologic: (-) allergies latex, (-) allergies metal, (-) skin sensitivity. Hematlogic: (-) anemia, (-) blood transfusion, (-) DVT/PE, (-) Clotting disorders      EXAM:      Constitution:  There were no vitals filed for this visit. Psycihatric:    The patient is alert and oriented x 3, appears to be stated age and in no distress. Respiratory:    Respiratory effort is not labored. Patient is not gasping. Palpation of the chest reveals no tactile fremitus. Skin:    Upon inspection: the skin appears warm, dry and intact. There is not a previous scar over the affected area. There is not any cellulitis, lymphedema or cutaneous lesions noted in the lower extremities. Upon palpation there is no induration noted. Neurologic:      Motor exam of the lower extremities show ; quadriceps, hamstrings, foot dorsi and plantar flexors intact R.  5/5 and L. 5/5. Deep tendon reflexes are 2/4 at the knees and 2/4 at the ankles with strong extensor hallicus longus motor strength bilaterally. Sensory to both feet is intact to all sensory roots. Cardiovascular: The vascular exam is normal and is well perfused to distal extremities. Distal pulses DP/PT: R. 2+; L. 2+. There is cap refill noted less than two seconds in all digits. There is not edema of the bilateral lower extremities. There is not varicosities noted in the distal extremities. Lymph:    Upon palpation,  there is no lymphadenopathy noted in bilateral lower extremities. Musculoskeletal:    Gait: antalgic; examination of the nails and digits reveal no cyanosis or clubbing. Knee exam - bilateral knee exam shows;  range of motion of R. Knee is 0 to 140, and L. Knee is 0 to 140.  The patient does not have  pain on motion, there is not an effusion, there is not tenderness over the  global region, there are not any masses, there is not ligamentous instability, there is not  deformity noted. Knee exam: the injured knee reveals normal exam, no swelling, tenderness, instability; ligaments intact, FROM. Ankle Exam:    Upon inspection and palpation of the Right ankle,  there is not deformity noted,  moderate swelling, moderate ecchymosis, has pain on palpation of right deltoid ligament, right calcaneofibular ligament, right tibiofibular ligament. ROM R: Limited Secondary to pain; Left ankle : DF20; PF 40;  INV 30, NICKOLAS 10. This exam was compared bilaterally. Right Ankle:   (-) Anterior Drawer ,  (-) Posterior Drawer ,  (-) Squeeze test,  (-) External Rotation, (-) Eversion test , (-) Madrigal Test     Left ankle:   (-) Anterior Drawer ,(-)  Posterior Drawer ,(-) Squeeze test,(-) External Rotation (-) Eversion test, (-) Madrigal Test.      Foot exam- visual inspection reveals warm, good capillary refill, there is not pain to palpation over the metatarsals. ROM inversion/eversion full range of motion, abduction/adduction full range of motion, ROM in MTP/PIP/DIP full range of motion. Xrays:    [facility] taken from [facility] reveal no significant or new fracture but shows moderate tibiotalar joint possible remote osteochondral impaction injury from years prior. Possible loose body intra-articular. Radiographic findings reviewed with patient      Impression:  German Hummel was seen today for ankle pain. Diagnoses and all orders for this visit:    Sprain of right ankle, unspecified ligament, initial encounter    Ankle arthritis          Patient seen and examined. X-rays reviewed. Patient had a new twisting injury to the right ankle. However x-rays show degenerative changes of the tibiotalar joint with possible loose body especially around the talus. In further discussion, patient did have a serious injury 4 to 5 years ago when she was 11.   Patient cannot remember the details and information is out of state. But appears that patient had a degree of osteochondral injury and/or impaction injury resulting and loose bodies as well as now mild to moderate tibiotalar joint which is unfortunate considering her age and activity level. However patient is 34 weeks pregnant at this time. Patient should do well with conservative management at this time. Plan:Natural history and expected course discussed. Questions answered. Rest, ice, compression, elevation (RICE) therapy. Crutches and instructions provided. Educational materials distributed. Fit with long leg cast boot for use over next 2-4 weeks. Patient was educated about removing cast boot for range of motion exercises since she is unable to take anti-inflammatory medication secondary to pregnancy. Patient is 34 weeks pregnant and should focus on pregnancy prior to anything further with the ankle. Home exercise plan outlined. OTC analgesics as needed. PT referral.  F/u prn if no better in 4-6 weeks        In a 15 minute assessment and discussion, patient was provided and fitted for a removable cast boot distributed and applied. She was educated in detail how to use cast boot and the importance of use as well as range of motion and HEP exercises. 45 minutes was spent with patient. 50% or greater was spent counseling the patient.

## 2020-06-17 ENCOUNTER — HOSPITAL ENCOUNTER (OUTPATIENT)
Age: 20
Setting detail: OBSERVATION
Discharge: HOME | End: 2020-06-17
Attending: OBSTETRICS & GYNECOLOGY | Admitting: OBSTETRICS & GYNECOLOGY
Payer: COMMERCIAL

## 2020-06-17 ENCOUNTER — HOSPITAL ENCOUNTER (OUTPATIENT)
Age: 20
Discharge: HOME | End: 2020-06-19
Payer: COMMERCIAL

## 2020-06-17 VITALS — SYSTOLIC BLOOD PRESSURE: 148 MMHG | DIASTOLIC BLOOD PRESSURE: 72 MMHG | HEART RATE: 106 BPM | RESPIRATION RATE: 18 BRPM

## 2020-06-17 PROBLEM — Z34.93 NORMAL PREGNANCY IN THIRD TRIMESTER: Status: ACTIVE | Noted: 2020-06-17

## 2020-06-17 LAB
ALBUMIN SERPL-MCNC: 3.5 G/DL (ref 3.5–5.2)
ALP BLD-CCNC: 199 U/L (ref 35–104)
ALT SERPL-CCNC: 7 U/L (ref 0–32)
ANION GAP SERPL CALCULATED.3IONS-SCNC: 14 MMOL/L (ref 7–16)
AST SERPL-CCNC: 15 U/L (ref 0–31)
BACTERIA: ABNORMAL /HPF
BASOPHILS ABSOLUTE: 0.03 E9/L (ref 0–0.2)
BASOPHILS RELATIVE PERCENT: 0.2 % (ref 0–2)
BILIRUB SERPL-MCNC: 0.3 MG/DL (ref 0–1.2)
BILIRUBIN URINE: NEGATIVE
BLOOD, URINE: NEGATIVE
BUN BLDV-MCNC: 5 MG/DL (ref 6–20)
CALCIUM SERPL-MCNC: 8.8 MG/DL (ref 8.6–10.2)
CHLORIDE BLD-SCNC: 105 MMOL/L (ref 98–107)
CLARITY: CLEAR
CO2: 20 MMOL/L (ref 22–29)
COLOR: YELLOW
CREAT SERPL-MCNC: 0.5 MG/DL (ref 0.5–1)
EOSINOPHILS ABSOLUTE: 0.11 E9/L (ref 0.05–0.5)
EOSINOPHILS RELATIVE PERCENT: 0.8 % (ref 0–6)
EPITHELIAL CELLS, UA: ABNORMAL /HPF
GFR AFRICAN AMERICAN: >60
GFR NON-AFRICAN AMERICAN: >60 ML/MIN/1.73
GLUCOSE BLD-MCNC: 95 MG/DL (ref 74–99)
GLUCOSE URINE: NEGATIVE MG/DL
HCT VFR BLD CALC: 33.3 % (ref 34–48)
HEMOGLOBIN: 10.3 G/DL (ref 11.5–15.5)
IMMATURE GRANULOCYTES #: 0.15 E9/L
IMMATURE GRANULOCYTES %: 1.1 % (ref 0–5)
KETONES, URINE: NEGATIVE MG/DL
LEUKOCYTE ESTERASE, URINE: NEGATIVE
LYMPHOCYTES ABSOLUTE: 2 E9/L (ref 1.5–4)
LYMPHOCYTES RELATIVE PERCENT: 14.7 % (ref 20–42)
MCH RBC QN AUTO: 25 PG (ref 26–35)
MCHC RBC AUTO-ENTMCNC: 30.9 % (ref 32–34.5)
MCV RBC AUTO: 80.8 FL (ref 80–99.9)
MONOCYTES ABSOLUTE: 0.8 E9/L (ref 0.1–0.95)
MONOCYTES RELATIVE PERCENT: 5.9 % (ref 2–12)
NEUTROPHILS ABSOLUTE: 10.54 E9/L (ref 1.8–7.3)
NEUTROPHILS RELATIVE PERCENT: 77.3 % (ref 43–80)
NITRITE, URINE: NEGATIVE
PDW BLD-RTO: 13.2 FL (ref 11.5–15)
PH UA: 7 (ref 5–9)
PLATELET # BLD: 141 E9/L (ref 130–450)
PMV BLD AUTO: 12.4 FL (ref 7–12)
POTASSIUM REFLEX MAGNESIUM: 3.8 MMOL/L (ref 3.5–5)
PROTEIN UA: NEGATIVE MG/DL
RBC # BLD: 4.12 E12/L (ref 3.5–5.5)
RBC UA: ABNORMAL /HPF (ref 0–2)
SODIUM BLD-SCNC: 139 MMOL/L (ref 132–146)
SPECIFIC GRAVITY UA: 1.01 (ref 1–1.03)
TOTAL PROTEIN: 6.4 G/DL (ref 6.4–8.3)
URIC ACID, SERUM: 3.6 MG/DL (ref 2.4–5.7)
UROBILINOGEN, URINE: 0.2 E.U./DL
WBC # BLD: 13.6 E9/L (ref 4.5–11.5)
WBC UA: ABNORMAL /HPF (ref 0–5)

## 2020-06-17 PROCEDURE — 36415 COLL VENOUS BLD VENIPUNCTURE: CPT

## 2020-06-17 PROCEDURE — 85025 COMPLETE CBC W/AUTO DIFF WBC: CPT

## 2020-06-17 PROCEDURE — 84550 ASSAY OF BLOOD/URIC ACID: CPT

## 2020-06-17 PROCEDURE — G0379 DIRECT REFER HOSPITAL OBSERV: HCPCS

## 2020-06-17 PROCEDURE — G0378 HOSPITAL OBSERVATION PER HR: HCPCS

## 2020-06-17 PROCEDURE — 87081 CULTURE SCREEN ONLY: CPT

## 2020-06-17 PROCEDURE — 80053 COMPREHEN METABOLIC PANEL: CPT

## 2020-06-17 PROCEDURE — 81001 URINALYSIS AUTO W/SCOPE: CPT

## 2020-06-17 NOTE — H&P
hypertension     Plan:     PIH labs done and negative  D/C home on bed rest    Anthony Brunson MD,6/17/2020 6:12 PM

## 2020-06-17 NOTE — PROGRESS NOTES
35 0/7 week pt of dr Keke Seay arrived ambulatory, sent over from the office for elevated bp and [address] labs. Orders received from dr Keke Seay before arrival. Pt denies any bleeding or leaking of fluid. Pt denies contractions and stated that she perceives fetal movement. Pt stated she had a previous csection. Oriented to triage 1. Call light in reach.

## 2020-06-19 ENCOUNTER — HOSPITAL ENCOUNTER (OUTPATIENT)
Age: 20
Setting detail: OBSERVATION
Discharge: HOME | End: 2020-06-20
Attending: OBSTETRICS & GYNECOLOGY | Admitting: OBSTETRICS & GYNECOLOGY
Payer: COMMERCIAL

## 2020-06-19 LAB
ALBUMIN SERPL-MCNC: 3.1 G/DL (ref 3.5–5.2)
ALP BLD-CCNC: 159 U/L (ref 35–104)
ALT SERPL-CCNC: 6 U/L (ref 0–32)
AMPHETAMINE SCREEN, URINE: NOT DETECTED
ANION GAP SERPL CALCULATED.3IONS-SCNC: 12 MMOL/L (ref 7–16)
AST SERPL-CCNC: 12 U/L (ref 0–31)
BACTERIA: ABNORMAL /HPF
BARBITURATE SCREEN URINE: NOT DETECTED
BASOPHILS ABSOLUTE: 0.03 E9/L (ref 0–0.2)
BASOPHILS RELATIVE PERCENT: 0.3 % (ref 0–2)
BENZODIAZEPINE SCREEN, URINE: NOT DETECTED
BILIRUB SERPL-MCNC: <0.2 MG/DL (ref 0–1.2)
BILIRUBIN URINE: NEGATIVE
BLOOD, URINE: NEGATIVE
BUN BLDV-MCNC: 4 MG/DL (ref 6–20)
CALCIUM SERPL-MCNC: 8.7 MG/DL (ref 8.6–10.2)
CANNABINOID SCREEN URINE: NOT DETECTED
CHLORIDE BLD-SCNC: 105 MMOL/L (ref 98–107)
CLARITY: ABNORMAL
CO2: 19 MMOL/L (ref 22–29)
COCAINE METABOLITE SCREEN URINE: NOT DETECTED
COLOR: YELLOW
CREAT SERPL-MCNC: 0.5 MG/DL (ref 0.5–1)
EOSINOPHILS ABSOLUTE: 0.1 E9/L (ref 0.05–0.5)
EOSINOPHILS RELATIVE PERCENT: 0.9 % (ref 0–6)
EPITHELIAL CELLS, UA: ABNORMAL /HPF
FENTANYL SCREEN, URINE: NOT DETECTED
GFR AFRICAN AMERICAN: >60
GFR NON-AFRICAN AMERICAN: >60 ML/MIN/1.73
GLUCOSE BLD-MCNC: 97 MG/DL (ref 74–99)
GLUCOSE URINE: NEGATIVE MG/DL
HCT VFR BLD CALC: 32.9 % (ref 34–48)
HEMOGLOBIN: 10.3 G/DL (ref 11.5–15.5)
IMMATURE GRANULOCYTES #: 0.09 E9/L
IMMATURE GRANULOCYTES %: 0.8 % (ref 0–5)
KETONES, URINE: NEGATIVE MG/DL
LEUKOCYTE ESTERASE, URINE: ABNORMAL
LYMPHOCYTES ABSOLUTE: 1.86 E9/L (ref 1.5–4)
LYMPHOCYTES RELATIVE PERCENT: 16.2 % (ref 20–42)
Lab: NORMAL
MCH RBC QN AUTO: 25.5 PG (ref 26–35)
MCHC RBC AUTO-ENTMCNC: 31.3 % (ref 32–34.5)
MCV RBC AUTO: 81.4 FL (ref 80–99.9)
METHADONE SCREEN, URINE: NOT DETECTED
MONOCYTES ABSOLUTE: 0.61 E9/L (ref 0.1–0.95)
MONOCYTES RELATIVE PERCENT: 5.3 % (ref 2–12)
NEUTROPHILS ABSOLUTE: 8.77 E9/L (ref 1.8–7.3)
NEUTROPHILS RELATIVE PERCENT: 76.5 % (ref 43–80)
NITRITE, URINE: NEGATIVE
OPIATE SCREEN URINE: NOT DETECTED
OXYCODONE URINE: NOT DETECTED
PDW BLD-RTO: 13.3 FL (ref 11.5–15)
PH UA: 7.5 (ref 5–9)
PHENCYCLIDINE SCREEN URINE: NOT DETECTED
PLATELET # BLD: 136 E9/L (ref 130–450)
PMV BLD AUTO: 12.6 FL (ref 7–12)
POTASSIUM SERPL-SCNC: 3.8 MMOL/L (ref 3.5–5)
PROTEIN UA: NEGATIVE MG/DL
RBC # BLD: 4.04 E12/L (ref 3.5–5.5)
RBC UA: ABNORMAL /HPF (ref 0–2)
RENAL EPITHELIAL, UA: ABNORMAL /HPF
SODIUM BLD-SCNC: 136 MMOL/L (ref 132–146)
SPECIFIC GRAVITY UA: 1.02 (ref 1–1.03)
TOTAL PROTEIN: 6 G/DL (ref 6.4–8.3)
URIC ACID, SERUM: 3.1 MG/DL (ref 2.4–5.7)
UROBILINOGEN, URINE: 0.2 E.U./DL
WBC # BLD: 11.5 E9/L (ref 4.5–11.5)
WBC UA: ABNORMAL /HPF (ref 0–5)

## 2020-06-19 PROCEDURE — 6370000000 HC RX 637 (ALT 250 FOR IP): Performed by: OBSTETRICS & GYNECOLOGY

## 2020-06-19 PROCEDURE — 36415 COLL VENOUS BLD VENIPUNCTURE: CPT

## 2020-06-19 PROCEDURE — 96361 HYDRATE IV INFUSION ADD-ON: CPT

## 2020-06-19 PROCEDURE — 2580000003 HC RX 258: Performed by: OBSTETRICS & GYNECOLOGY

## 2020-06-19 PROCEDURE — 80307 DRUG TEST PRSMV CHEM ANLYZR: CPT

## 2020-06-19 PROCEDURE — 81001 URINALYSIS AUTO W/SCOPE: CPT

## 2020-06-19 PROCEDURE — 85025 COMPLETE CBC W/AUTO DIFF WBC: CPT

## 2020-06-19 PROCEDURE — 80053 COMPREHEN METABOLIC PANEL: CPT

## 2020-06-19 PROCEDURE — G0379 DIRECT REFER HOSPITAL OBSERV: HCPCS

## 2020-06-19 PROCEDURE — 96360 HYDRATION IV INFUSION INIT: CPT

## 2020-06-19 PROCEDURE — G0378 HOSPITAL OBSERVATION PER HR: HCPCS

## 2020-06-19 PROCEDURE — 84550 ASSAY OF BLOOD/URIC ACID: CPT

## 2020-06-19 RX ORDER — ACETAMINOPHEN 325 MG/1
650 TABLET ORAL EVERY 4 HOURS PRN
Status: DISCONTINUED | OUTPATIENT
Start: 2020-06-19 | End: 2020-06-21 | Stop reason: HOSPADM

## 2020-06-19 RX ORDER — SODIUM CHLORIDE, SODIUM LACTATE, POTASSIUM CHLORIDE, CALCIUM CHLORIDE 600; 310; 30; 20 MG/100ML; MG/100ML; MG/100ML; MG/100ML
INJECTION, SOLUTION INTRAVENOUS CONTINUOUS
Status: DISCONTINUED | OUTPATIENT
Start: 2020-06-19 | End: 2020-06-21 | Stop reason: HOSPADM

## 2020-06-19 RX ADMIN — ACETAMINOPHEN 650 MG: 325 TABLET, FILM COATED ORAL at 20:20

## 2020-06-19 RX ADMIN — SODIUM CHLORIDE, POTASSIUM CHLORIDE, SODIUM LACTATE AND CALCIUM CHLORIDE: 600; 310; 30; 20 INJECTION, SOLUTION INTRAVENOUS at 18:00

## 2020-06-19 ASSESSMENT — PAIN - FUNCTIONAL ASSESSMENT: PAIN_FUNCTIONAL_ASSESSMENT: 0-10

## 2020-06-19 ASSESSMENT — PAIN SCALES - GENERAL: PAINLEVEL_OUTOF10: 5

## 2020-06-19 NOTE — H&P
Subjective:      Trent Rodriguez is an 20 y.o. female at 28 and 2/7 weeks gestation presenting with   Chief Complaint   Patient presents with    Hypertension   Denies any headaches or blurry vision. She reports feeling generalized malaise. She is also complaining of contractions every a few minutes lasting few seconds. Other associated symptoms include low back pain. Fetal Movement: normal.  Past Medical History:   Diagnosis Date    Autoimmune disorder ([address]     ITP    Disease of blood and blood forming organ     blood clotting issue    Hypertension        Review of Systems  Pertinent items are noted in HPI. Objective:      BP (!) 167/94   Pulse 102   Resp 16   LMP 11/28/2019   Blood pressure (!) 167/94, pulse 102, resp. rate 16, last menstrual period 11/28/2019. General:   alert, appears stated age and cooperative   Cervix:   closed.    FHT:   140 BPM     Lab Review    CBC:   Lab Results   Component Value Date    WBC 11.5 06/19/2020    RBC 4.04 06/19/2020    HGB 10.3 06/19/2020    HCT 32.9 06/19/2020    MCV 81.4 06/19/2020    MCH 25.5 06/19/2020    MCHC 31.3 06/19/2020    RDW 13.3 06/19/2020     06/19/2020    MPV 12.6 06/19/2020     CMP:    Lab Results   Component Value Date     06/19/2020    K 3.8 06/19/2020    K 3.8 06/17/2020     06/19/2020    CO2 19 06/19/2020    BUN 4 06/19/2020    CREATININE 0.5 06/19/2020    GFRAA >60 06/19/2020    LABGLOM >60 06/19/2020    GLUCOSE 97 06/19/2020    PROT 6.0 06/19/2020    LABALBU 3.1 06/19/2020    CALCIUM 8.7 06/19/2020    BILITOT <0.2 06/19/2020    ALKPHOS 159 06/19/2020    AST 12 06/19/2020    ALT 6 06/19/2020     U/A:    Lab Results   Component Value Date    COLORU Yellow 06/19/2020    PHUR 7.5 06/19/2020    WBCUA 5-10 06/19/2020    RBCUA NONE 06/19/2020    BACTERIA MANY 06/19/2020    CLARITYU SLCLOUDY 06/19/2020    SPECGRAV 1.020 06/19/2020    LEUKOCYTESUR MODERATE 06/19/2020    UROBILINOGEN 0.2 06/19/2020    BILIRUBINUR Negative 06/19/2020 BLOODU Negative 2020    GLUCOSEU Negative 2020          Assessment:     35 weeks and 2-day pregnancy  Previous  x1  Gestational hypertension    Plan:     Repeat PIH labs  Keep n.p.o. for now    Anthony Brunson MD,2020 5:49 PM

## 2020-06-20 VITALS
RESPIRATION RATE: 16 BRPM | BODY MASS INDEX: 46.1 KG/M2 | HEART RATE: 94 BPM | OXYGEN SATURATION: 97 % | DIASTOLIC BLOOD PRESSURE: 85 MMHG | TEMPERATURE: 98.3 F | HEIGHT: 64 IN | WEIGHT: 270 LBS | SYSTOLIC BLOOD PRESSURE: 140 MMHG

## 2020-06-20 LAB
24HR URINE VOLUME (ML): 2750 ML
CREATININE 24 HOUR URINE: 1733 MG/24H (ref 720–1510)
GROUP B STREP CULTURE: NORMAL
Lab: 24 HOURS
PROTEIN 24 HOUR URINE: 0.25 G/24HR (ref 0–0.14)

## 2020-06-20 PROCEDURE — G0378 HOSPITAL OBSERVATION PER HR: HCPCS

## 2020-06-20 PROCEDURE — 2580000003 HC RX 258: Performed by: OBSTETRICS & GYNECOLOGY

## 2020-06-20 PROCEDURE — 84156 ASSAY OF PROTEIN URINE: CPT

## 2020-06-20 RX ORDER — SODIUM CHLORIDE 0.9 % (FLUSH) 0.9 %
SYRINGE (ML) INJECTION
Status: DISCONTINUED
Start: 2020-06-20 | End: 2020-06-20 | Stop reason: HOSPADM

## 2020-06-20 RX ADMIN — SODIUM CHLORIDE, POTASSIUM CHLORIDE, SODIUM LACTATE AND CALCIUM CHLORIDE: 600; 310; 30; 20 INJECTION, SOLUTION INTRAVENOUS at 09:46

## 2020-06-20 RX ADMIN — SODIUM CHLORIDE, POTASSIUM CHLORIDE, SODIUM LACTATE AND CALCIUM CHLORIDE: 600; 310; 30; 20 INJECTION, SOLUTION INTRAVENOUS at 18:39

## 2020-06-20 RX ADMIN — SODIUM CHLORIDE, POTASSIUM CHLORIDE, SODIUM LACTATE AND CALCIUM CHLORIDE: 600; 310; 30; 20 INJECTION, SOLUTION INTRAVENOUS at 01:52

## 2020-06-20 ASSESSMENT — PAIN - FUNCTIONAL ASSESSMENT: PAIN_FUNCTIONAL_ASSESSMENT: 0-10

## 2020-06-20 NOTE — PROGRESS NOTES
EFM reapplied. Positive fetal movement perceived by pt and audible per RN. EFM monitored and assessed every 15 minutes. Rest encouraged.

## 2020-06-24 ENCOUNTER — APPOINTMENT (OUTPATIENT)
Dept: LABOR AND DELIVERY | Age: 20
End: 2020-06-24
Payer: COMMERCIAL

## 2020-06-24 ENCOUNTER — HOSPITAL ENCOUNTER (OUTPATIENT)
Age: 20
Discharge: HOME | End: 2020-06-24
Attending: OBSTETRICS & GYNECOLOGY | Admitting: OBSTETRICS & GYNECOLOGY
Payer: COMMERCIAL

## 2020-06-24 VITALS
TEMPERATURE: 97.8 F | SYSTOLIC BLOOD PRESSURE: 129 MMHG | HEART RATE: 103 BPM | DIASTOLIC BLOOD PRESSURE: 69 MMHG | RESPIRATION RATE: 16 BRPM

## 2020-06-24 PROCEDURE — 59025 FETAL NON-STRESS TEST: CPT

## 2020-06-24 NOTE — PROGRESS NOTES
36 th weeks gestation here for scheduled NST. Denies vaginal bleeding, SOB, chest pain, leaking fluid or cramping at this time. Orientated to room and procedure. Placed on monitor. Plan of care discussed. Will continue to monitor. Call light with in reach.

## 2020-06-27 ENCOUNTER — HOSPITAL ENCOUNTER (OUTPATIENT)
Age: 20
Discharge: HOME | End: 2020-06-27
Attending: OBSTETRICS & GYNECOLOGY | Admitting: OBSTETRICS & GYNECOLOGY
Payer: COMMERCIAL

## 2020-06-27 ENCOUNTER — APPOINTMENT (OUTPATIENT)
Dept: LABOR AND DELIVERY | Age: 20
End: 2020-06-27
Payer: COMMERCIAL

## 2020-06-27 VITALS — DIASTOLIC BLOOD PRESSURE: 80 MMHG | HEART RATE: 100 BPM | SYSTOLIC BLOOD PRESSURE: 121 MMHG

## 2020-06-27 PROBLEM — O13.3 GESTATIONAL HYPERTENSION WITHOUT SIGNIFICANT PROTEINURIA IN THIRD TRIMESTER: Status: ACTIVE | Noted: 2020-06-27

## 2020-06-27 PROCEDURE — 59025 FETAL NON-STRESS TEST: CPT

## 2020-06-27 NOTE — PROGRESS NOTES
Diagnosis:   Hypertension     Result:  Reactive       Plan:  Discharge home    F/U as scheduled      Anthony Brunson

## 2020-07-01 ENCOUNTER — HOSPITAL ENCOUNTER (OUTPATIENT)
Age: 20
Discharge: HOME | End: 2020-07-01
Attending: OBSTETRICS & GYNECOLOGY | Admitting: OBSTETRICS & GYNECOLOGY
Payer: COMMERCIAL

## 2020-07-01 ENCOUNTER — APPOINTMENT (OUTPATIENT)
Dept: LABOR AND DELIVERY | Age: 20
End: 2020-07-01
Payer: COMMERCIAL

## 2020-07-01 VITALS
RESPIRATION RATE: 16 BRPM | HEART RATE: 102 BPM | SYSTOLIC BLOOD PRESSURE: 145 MMHG | DIASTOLIC BLOOD PRESSURE: 86 MMHG | TEMPERATURE: 98 F

## 2020-07-01 PROCEDURE — 59025 FETAL NON-STRESS TEST: CPT

## 2020-07-04 ENCOUNTER — APPOINTMENT (OUTPATIENT)
Dept: LABOR AND DELIVERY | Age: 20
End: 2020-07-04
Payer: COMMERCIAL

## 2020-07-04 ENCOUNTER — HOSPITAL ENCOUNTER (OUTPATIENT)
Age: 20
Discharge: HOME | End: 2020-07-04
Attending: OBSTETRICS & GYNECOLOGY | Admitting: OBSTETRICS & GYNECOLOGY
Payer: COMMERCIAL

## 2020-07-04 VITALS
RESPIRATION RATE: 16 BRPM | TEMPERATURE: 98.1 F | HEART RATE: 103 BPM | SYSTOLIC BLOOD PRESSURE: 128 MMHG | DIASTOLIC BLOOD PRESSURE: 65 MMHG

## 2020-07-04 PROCEDURE — 59025 FETAL NON-STRESS TEST: CPT

## 2020-07-04 NOTE — PROGRESS NOTES
37w3d  [address] 20 ambulatory to L&D for scheduled NST due to [facility] and ITP. EFM applied. Maternal perception of fetal movement noted. Patient denies any bleeding, leaking of fluid, or contractions. Call light in reach.

## 2020-07-08 ENCOUNTER — APPOINTMENT (OUTPATIENT)
Dept: LABOR AND DELIVERY | Age: 20
End: 2020-07-08
Payer: COMMERCIAL

## 2020-07-08 ENCOUNTER — HOSPITAL ENCOUNTER (OUTPATIENT)
Age: 20
Discharge: HOME | End: 2020-07-08
Attending: OBSTETRICS & GYNECOLOGY | Admitting: OBSTETRICS & GYNECOLOGY
Payer: COMMERCIAL

## 2020-07-08 VITALS
TEMPERATURE: 98 F | SYSTOLIC BLOOD PRESSURE: 132 MMHG | HEART RATE: 96 BPM | DIASTOLIC BLOOD PRESSURE: 67 MMHG | RESPIRATION RATE: 18 BRPM

## 2020-07-08 PROCEDURE — 59025 FETAL NON-STRESS TEST: CPT

## 2020-07-08 NOTE — PROGRESS NOTES
38w0d  [facility] 20 ambulatory to L&D for scheduled NST due to HTN and ITP. EFM applied. Maternal perception of fetal movement noted. Patient denies bleeding, leaking of fluid, or contractions. Call light in reach.

## 2020-07-10 ENCOUNTER — HOSPITAL ENCOUNTER (OUTPATIENT)
Age: 20
Discharge: HOME | End: 2020-07-12
Payer: COMMERCIAL

## 2020-07-10 PROCEDURE — U0003 INFECTIOUS AGENT DETECTION BY NUCLEIC ACID (DNA OR RNA); SEVERE ACUTE RESPIRATORY SYNDROME CORONAVIRUS 2 (SARS-COV-2) (CORONAVIRUS DISEASE [COVID-19]), AMPLIFIED PROBE TECHNIQUE, MAKING USE OF HIGH THROUGHPUT TECHNOLOGIES AS DESCRIBED BY CMS-2020-01-R: HCPCS

## 2020-07-11 ENCOUNTER — APPOINTMENT (OUTPATIENT)
Dept: LABOR AND DELIVERY | Age: 20
End: 2020-07-11
Payer: COMMERCIAL

## 2020-07-11 ENCOUNTER — HOSPITAL ENCOUNTER (OUTPATIENT)
Age: 20
Discharge: HOME | End: 2020-07-11
Attending: OBSTETRICS & GYNECOLOGY | Admitting: OBSTETRICS & GYNECOLOGY
Payer: COMMERCIAL

## 2020-07-11 VITALS
SYSTOLIC BLOOD PRESSURE: 141 MMHG | RESPIRATION RATE: 16 BRPM | HEART RATE: 110 BPM | TEMPERATURE: 98.6 F | DIASTOLIC BLOOD PRESSURE: 86 MMHG

## 2020-07-11 LAB
SARS-COV-2: NOT DETECTED
SOURCE: NORMAL

## 2020-07-11 PROCEDURE — 59025 FETAL NON-STRESS TEST: CPT

## 2020-07-11 NOTE — PROGRESS NOTES
38w3d. Pt arrived for NST for PIH and ITP. EFM/TOCO on. Denies LOF, bleeding, contractions. Perceives fetal movement. Audible fetal heart tones. PO fluids given with call light in reach.

## 2020-07-15 ENCOUNTER — ANESTHESIA (OUTPATIENT)
Dept: LABOR AND DELIVERY | Age: 20
DRG: 540 | End: 2020-07-15
Payer: COMMERCIAL

## 2020-07-15 ENCOUNTER — HOSPITAL ENCOUNTER (INPATIENT)
Age: 20
LOS: 3 days | Discharge: HOME | DRG: 540 | End: 2020-07-18
Attending: OBSTETRICS & GYNECOLOGY | Admitting: OBSTETRICS & GYNECOLOGY
Payer: COMMERCIAL

## 2020-07-15 ENCOUNTER — ANESTHESIA EVENT (OUTPATIENT)
Dept: LABOR AND DELIVERY | Age: 20
DRG: 540 | End: 2020-07-15
Payer: COMMERCIAL

## 2020-07-15 VITALS — OXYGEN SATURATION: 99 % | SYSTOLIC BLOOD PRESSURE: 158 MMHG | DIASTOLIC BLOOD PRESSURE: 83 MMHG

## 2020-07-15 PROBLEM — Z34.90 TERM PREGNANCY: Status: ACTIVE | Noted: 2020-07-15

## 2020-07-15 PROBLEM — Z3A.39 39 WEEKS GESTATION OF PREGNANCY: Status: ACTIVE | Noted: 2020-04-01

## 2020-07-15 PROBLEM — Z34.93 NORMAL PREGNANCY IN THIRD TRIMESTER: Status: RESOLVED | Noted: 2020-06-17 | Resolved: 2020-07-15

## 2020-07-15 LAB
ABO/RH: NORMAL
AMPHETAMINE SCREEN, URINE: NOT DETECTED
ANION GAP SERPL CALCULATED.3IONS-SCNC: 15 MMOL/L (ref 7–16)
ANTIBODY SCREEN: NORMAL
BARBITURATE SCREEN URINE: NOT DETECTED
BENZODIAZEPINE SCREEN, URINE: NOT DETECTED
BUN BLDV-MCNC: 5 MG/DL (ref 6–20)
CALCIUM SERPL-MCNC: 8.6 MG/DL (ref 8.6–10.2)
CANNABINOID SCREEN URINE: NOT DETECTED
CHLORIDE BLD-SCNC: 102 MMOL/L (ref 98–107)
CO2: 19 MMOL/L (ref 22–29)
COCAINE METABOLITE SCREEN URINE: NOT DETECTED
CREAT SERPL-MCNC: 0.5 MG/DL (ref 0.5–1)
FENTANYL SCREEN, URINE: NOT DETECTED
GFR AFRICAN AMERICAN: >60
GFR NON-AFRICAN AMERICAN: >60 ML/MIN/1.73
GLUCOSE BLD-MCNC: 98 MG/DL (ref 74–99)
HCT VFR BLD CALC: 31.5 % (ref 34–48)
HEMOGLOBIN: 9.7 G/DL (ref 11.5–15.5)
Lab: NORMAL
MCH RBC QN AUTO: 24.1 PG (ref 26–35)
MCHC RBC AUTO-ENTMCNC: 30.8 % (ref 32–34.5)
MCV RBC AUTO: 78.4 FL (ref 80–99.9)
METHADONE SCREEN, URINE: NOT DETECTED
OPIATE SCREEN URINE: NOT DETECTED
OXYCODONE URINE: NOT DETECTED
PDW BLD-RTO: 14 FL (ref 11.5–15)
PHENCYCLIDINE SCREEN URINE: NOT DETECTED
PLATELET # BLD: 164 E9/L (ref 130–450)
PMV BLD AUTO: 12.3 FL (ref 7–12)
POTASSIUM SERPL-SCNC: 3.8 MMOL/L (ref 3.5–5)
RBC # BLD: 4.02 E12/L (ref 3.5–5.5)
SODIUM BLD-SCNC: 136 MMOL/L (ref 132–146)
WBC # BLD: 11.1 E9/L (ref 4.5–11.5)

## 2020-07-15 PROCEDURE — 3700000001 HC ADD 15 MINUTES (ANESTHESIA): Performed by: OBSTETRICS & GYNECOLOGY

## 2020-07-15 PROCEDURE — 3700000000 HC ANESTHESIA ATTENDED CARE: Performed by: OBSTETRICS & GYNECOLOGY

## 2020-07-15 PROCEDURE — 7100000000 HC PACU RECOVERY - FIRST 15 MIN: Performed by: OBSTETRICS & GYNECOLOGY

## 2020-07-15 PROCEDURE — 86850 RBC ANTIBODY SCREEN: CPT

## 2020-07-15 PROCEDURE — 85027 COMPLETE CBC AUTOMATED: CPT

## 2020-07-15 PROCEDURE — 7100000001 HC PACU RECOVERY - ADDTL 15 MIN: Performed by: OBSTETRICS & GYNECOLOGY

## 2020-07-15 PROCEDURE — 6370000000 HC RX 637 (ALT 250 FOR IP): Performed by: OBSTETRICS & GYNECOLOGY

## 2020-07-15 PROCEDURE — 2580000003 HC RX 258: Performed by: OBSTETRICS & GYNECOLOGY

## 2020-07-15 PROCEDURE — 1220000001 HC SEMI PRIVATE L&D R&B

## 2020-07-15 PROCEDURE — 36415 COLL VENOUS BLD VENIPUNCTURE: CPT

## 2020-07-15 PROCEDURE — 86901 BLOOD TYPING SEROLOGIC RH(D): CPT

## 2020-07-15 PROCEDURE — 3609079900 HC CESAREAN SECTION: Performed by: OBSTETRICS & GYNECOLOGY

## 2020-07-15 PROCEDURE — 6360000002 HC RX W HCPCS: Performed by: REGISTERED NURSE

## 2020-07-15 PROCEDURE — 86900 BLOOD TYPING SEROLOGIC ABO: CPT

## 2020-07-15 PROCEDURE — 80048 BASIC METABOLIC PNL TOTAL CA: CPT

## 2020-07-15 PROCEDURE — 2500000003 HC RX 250 WO HCPCS: Performed by: REGISTERED NURSE

## 2020-07-15 PROCEDURE — 2709999900 HC NON-CHARGEABLE SUPPLY: Performed by: OBSTETRICS & GYNECOLOGY

## 2020-07-15 PROCEDURE — 80307 DRUG TEST PRSMV CHEM ANLYZR: CPT

## 2020-07-15 RX ORDER — DIPHENHYDRAMINE HYDROCHLORIDE 50 MG/ML
25 INJECTION INTRAMUSCULAR; INTRAVENOUS EVERY 6 HOURS PRN
Status: DISCONTINUED | OUTPATIENT
Start: 2020-07-15 | End: 2020-07-15

## 2020-07-15 RX ORDER — SODIUM CHLORIDE, SODIUM LACTATE, POTASSIUM CHLORIDE, CALCIUM CHLORIDE 600; 310; 30; 20 MG/100ML; MG/100ML; MG/100ML; MG/100ML
INJECTION, SOLUTION INTRAVENOUS CONTINUOUS
Status: DISCONTINUED | OUTPATIENT
Start: 2020-07-15 | End: 2020-07-18 | Stop reason: HOSPADM

## 2020-07-15 RX ORDER — SODIUM CHLORIDE, SODIUM LACTATE, POTASSIUM CHLORIDE, AND CALCIUM CHLORIDE .6; .31; .03; .02 G/100ML; G/100ML; G/100ML; G/100ML
1000 INJECTION, SOLUTION INTRAVENOUS ONCE
Status: COMPLETED | OUTPATIENT
Start: 2020-07-15 | End: 2020-07-15

## 2020-07-15 RX ORDER — SODIUM CHLORIDE 0.9 % (FLUSH) 0.9 %
10 SYRINGE (ML) INJECTION PRN
Status: DISCONTINUED | OUTPATIENT
Start: 2020-07-15 | End: 2020-07-15

## 2020-07-15 RX ORDER — TRISODIUM CITRATE DIHYDRATE AND CITRIC ACID MONOHYDRATE 500; 334 MG/5ML; MG/5ML
30 SOLUTION ORAL ONCE
Status: COMPLETED | OUTPATIENT
Start: 2020-07-15 | End: 2020-07-15

## 2020-07-15 RX ORDER — OXYCODONE HYDROCHLORIDE AND ACETAMINOPHEN 5; 325 MG/1; MG/1
2 TABLET ORAL EVERY 4 HOURS PRN
Status: DISCONTINUED | OUTPATIENT
Start: 2020-07-16 | End: 2020-07-18 | Stop reason: HOSPADM

## 2020-07-15 RX ORDER — ONDANSETRON 2 MG/ML
4 INJECTION INTRAMUSCULAR; INTRAVENOUS EVERY 6 HOURS PRN
Status: DISCONTINUED | OUTPATIENT
Start: 2020-07-15 | End: 2020-07-18 | Stop reason: HOSPADM

## 2020-07-15 RX ORDER — MODIFIED LANOLIN
OINTMENT (GRAM) TOPICAL
Status: DISCONTINUED | OUTPATIENT
Start: 2020-07-15 | End: 2020-07-18 | Stop reason: HOSPADM

## 2020-07-15 RX ORDER — DOCUSATE SODIUM 100 MG/1
100 CAPSULE, LIQUID FILLED ORAL 2 TIMES DAILY
Status: DISCONTINUED | OUTPATIENT
Start: 2020-07-15 | End: 2020-07-18 | Stop reason: HOSPADM

## 2020-07-15 RX ORDER — MORPHINE SULFATE 1 MG/ML
INJECTION, SOLUTION EPIDURAL; INTRATHECAL; INTRAVENOUS PRN
Status: DISCONTINUED | OUTPATIENT
Start: 2020-07-15 | End: 2020-07-15 | Stop reason: SDUPTHER

## 2020-07-15 RX ORDER — IBUPROFEN 800 MG/1
800 TABLET ORAL EVERY 6 HOURS PRN
Status: DISCONTINUED | OUTPATIENT
Start: 2020-07-15 | End: 2020-07-15 | Stop reason: CLARIF

## 2020-07-15 RX ORDER — NALBUPHINE HCL 10 MG/ML
5 AMPUL (ML) INJECTION EVERY 4 HOURS PRN
Status: DISCONTINUED | OUTPATIENT
Start: 2020-07-15 | End: 2020-07-18 | Stop reason: HOSPADM

## 2020-07-15 RX ORDER — PHENYLEPHRINE HYDROCHLORIDE 10 MG/ML
INJECTION INTRAVENOUS PRN
Status: DISCONTINUED | OUTPATIENT
Start: 2020-07-15 | End: 2020-07-15 | Stop reason: SDUPTHER

## 2020-07-15 RX ORDER — CEFOXITIN SODIUM 2 G/50ML
2 INJECTION, SOLUTION INTRAVENOUS ONCE
Status: DISCONTINUED | OUTPATIENT
Start: 2020-07-15 | End: 2020-07-15

## 2020-07-15 RX ORDER — ONDANSETRON 4 MG/1
8 TABLET, ORALLY DISINTEGRATING ORAL EVERY 8 HOURS PRN
Status: DISCONTINUED | OUTPATIENT
Start: 2020-07-15 | End: 2020-07-18 | Stop reason: HOSPADM

## 2020-07-15 RX ORDER — METHYLERGONOVINE MALEATE 0.2 MG/ML
200 INJECTION INTRAVENOUS PRN
Status: DISCONTINUED | OUTPATIENT
Start: 2020-07-15 | End: 2020-07-18 | Stop reason: HOSPADM

## 2020-07-15 RX ORDER — OXYCODONE HYDROCHLORIDE 5 MG/1
10 TABLET ORAL EVERY 4 HOURS PRN
Status: ACTIVE | OUTPATIENT
Start: 2020-07-15 | End: 2020-07-16

## 2020-07-15 RX ORDER — OXYCODONE HYDROCHLORIDE AND ACETAMINOPHEN 5; 325 MG/1; MG/1
1 TABLET ORAL EVERY 4 HOURS PRN
Status: DISCONTINUED | OUTPATIENT
Start: 2020-07-16 | End: 2020-07-18 | Stop reason: HOSPADM

## 2020-07-15 RX ORDER — MEPERIDINE HYDROCHLORIDE 25 MG/ML
25 INJECTION INTRAMUSCULAR; INTRAVENOUS; SUBCUTANEOUS EVERY 6 HOURS PRN
Status: DISCONTINUED | OUTPATIENT
Start: 2020-07-15 | End: 2020-07-15

## 2020-07-15 RX ORDER — FERROUS SULFATE 325(65) MG
325 TABLET ORAL 2 TIMES DAILY WITH MEALS
Status: DISCONTINUED | OUTPATIENT
Start: 2020-07-16 | End: 2020-07-18 | Stop reason: HOSPADM

## 2020-07-15 RX ORDER — BUPIVACAINE HYDROCHLORIDE 7.5 MG/ML
INJECTION, SOLUTION INTRASPINAL PRN
Status: DISCONTINUED | OUTPATIENT
Start: 2020-07-15 | End: 2020-07-15 | Stop reason: SDUPTHER

## 2020-07-15 RX ORDER — SODIUM CHLORIDE 0.9 % (FLUSH) 0.9 %
10 SYRINGE (ML) INJECTION EVERY 12 HOURS SCHEDULED
Status: DISCONTINUED | OUTPATIENT
Start: 2020-07-15 | End: 2020-07-18 | Stop reason: HOSPADM

## 2020-07-15 RX ORDER — NALOXONE HYDROCHLORIDE 0.4 MG/ML
0.4 INJECTION, SOLUTION INTRAMUSCULAR; INTRAVENOUS; SUBCUTANEOUS PRN
Status: DISCONTINUED | OUTPATIENT
Start: 2020-07-15 | End: 2020-07-18 | Stop reason: HOSPADM

## 2020-07-15 RX ORDER — OXYCODONE HYDROCHLORIDE 5 MG/1
5 TABLET ORAL EVERY 4 HOURS PRN
Status: ACTIVE | OUTPATIENT
Start: 2020-07-15 | End: 2020-07-16

## 2020-07-15 RX ORDER — SODIUM CHLORIDE, SODIUM LACTATE, POTASSIUM CHLORIDE, CALCIUM CHLORIDE 600; 310; 30; 20 MG/100ML; MG/100ML; MG/100ML; MG/100ML
INJECTION, SOLUTION INTRAVENOUS CONTINUOUS
Status: DISCONTINUED | OUTPATIENT
Start: 2020-07-15 | End: 2020-07-15

## 2020-07-15 RX ORDER — SODIUM CHLORIDE 0.9 % (FLUSH) 0.9 %
10 SYRINGE (ML) INJECTION EVERY 12 HOURS SCHEDULED
Status: DISCONTINUED | OUTPATIENT
Start: 2020-07-15 | End: 2020-07-15

## 2020-07-15 RX ORDER — SODIUM CHLORIDE 0.9 % (FLUSH) 0.9 %
10 SYRINGE (ML) INJECTION PRN
Status: DISCONTINUED | OUTPATIENT
Start: 2020-07-15 | End: 2020-07-18 | Stop reason: HOSPADM

## 2020-07-15 RX ORDER — ACETAMINOPHEN 325 MG/1
325 TABLET ORAL EVERY 4 HOURS PRN
Status: DISCONTINUED | OUTPATIENT
Start: 2020-07-15 | End: 2020-07-18 | Stop reason: HOSPADM

## 2020-07-15 RX ADMIN — SODIUM CITRATE AND CITRIC ACID MONOHYDRATE 30 ML: 500; 334 SOLUTION ORAL at 05:37

## 2020-07-15 RX ADMIN — SODIUM CHLORIDE, POTASSIUM CHLORIDE, SODIUM LACTATE AND CALCIUM CHLORIDE 1000 ML: 600; 310; 30; 20 INJECTION, SOLUTION INTRAVENOUS at 05:37

## 2020-07-15 RX ADMIN — BUPIVACAINE HYDROCHLORIDE IN DEXTROSE 1.6 ML: 7.5 INJECTION, SOLUTION SUBARACHNOID at 07:15

## 2020-07-15 RX ADMIN — Medication 999 ML/HR: at 07:39

## 2020-07-15 RX ADMIN — SODIUM CHLORIDE, POTASSIUM CHLORIDE, SODIUM LACTATE AND CALCIUM CHLORIDE: 600; 310; 30; 20 INJECTION, SOLUTION INTRAVENOUS at 15:10

## 2020-07-15 RX ADMIN — PHENYLEPHRINE HYDROCHLORIDE 100 MCG: 10 INJECTION INTRAVENOUS at 07:28

## 2020-07-15 RX ADMIN — SODIUM CHLORIDE, POTASSIUM CHLORIDE, SODIUM LACTATE AND CALCIUM CHLORIDE: 600; 310; 30; 20 INJECTION, SOLUTION INTRAVENOUS at 07:13

## 2020-07-15 RX ADMIN — MORPHINE SULFATE 0.15 MG: 1 INJECTION, SOLUTION EPIDURAL; INTRATHECAL; INTRAVENOUS at 07:15

## 2020-07-15 RX ADMIN — SODIUM CHLORIDE, POTASSIUM CHLORIDE, SODIUM LACTATE AND CALCIUM CHLORIDE: 600; 310; 30; 20 INJECTION, SOLUTION INTRAVENOUS at 07:37

## 2020-07-15 ASSESSMENT — PULMONARY FUNCTION TESTS
PIF_VALUE: 0
PIF_VALUE: 1
PIF_VALUE: 0
PIF_VALUE: 1
PIF_VALUE: 0

## 2020-07-15 ASSESSMENT — PAIN SCALES - GENERAL: PAINLEVEL_OUTOF10: 0

## 2020-07-15 NOTE — PROGRESS NOTES
, 39 wk. Arrived ambulatory  To labor and delivery for a scheduled repeat . Denies leaking of fluid or vaginal bleeding. perceived fetal movement by patient.  Oriented to 213

## 2020-07-15 NOTE — PROGRESS NOTES
Notified Dr Andrew Ojeda of standard orders of lovenox and toradol , motrin being D/C.d due to patient having a condition called ITP( Idiopathic Thrombocytopenic purpura)  Instructed to keep SCD's on patient.

## 2020-07-15 NOTE — ANESTHESIA PROCEDURE NOTES
Spinal Block    Patient location during procedure: OR  Reason for block: primary anesthetic  Staffing  Anesthesiologist: Giovanni Dejesus MD  Resident/CRNA: ALONDRA Casanova - NASIMA  Other anesthesia staff: Abran Alfaro RN  Performed: other anesthesia staff   Preanesthetic Checklist  Completed: patient identified, site marked, surgical consent, pre-op evaluation, timeout performed, IV checked, risks and benefits discussed, monitors and equipment checked, anesthesia consent given, oxygen available and patient being monitored  Spinal Block  Patient position: sitting  Prep: ChloraPrep  Patient monitoring: continuous pulse ox and frequent blood pressure checks  Approach: midline  Location: L3/L4  Provider prep: mask and sterile gloves  Local infiltration: lidocaine  Agent: bupivacaine  Adjuvant: duramorph  Dose: 1.6  Dose: 1.6  Needle  Needle type: Pencan   Needle gauge: 25 G  Needle length: 3.5 in  Assessment  Sensory level: T6  Events: cerebrospinal fluid  Swirl obtained: Yes  CSF: clear  Attempts: 1  Hemodynamics: stable

## 2020-07-15 NOTE — ANESTHESIA POSTPROCEDURE EVALUATION
Department of Anesthesiology  Postprocedure Note    Patient: Niall Russell  MRN: [id number]  [place]: 2000  Date of evaluation: 7/15/2020  Time:  2:32 PM     Procedure Summary     Date:  07/15/20 Room / Location:  [facility]  [facility]    Anesthesia Start:  07 Anesthesia Stop:      Procedure:  REPEAT  SECTION (N/A Abdomen) Diagnosis:  (REPEAT  SECTION)    Surgeon:  Bryon Galeano MD Responsible Provider:  Bebeto Sethi MD    Anesthesia Type:  spinal ASA Status:  3          Anesthesia Type: spinal    Dillon Phase I: Dillon Score: (P) 9    Dillon Phase II: Dillon Score: 10    Last vitals: Reviewed and per EMR flowsheets.        Anesthesia Post Evaluation    Patient location during evaluation: bedside  Patient participation: complete - patient participated  Level of consciousness: awake  Pain score: 2  Airway patency: patent  Nausea & Vomiting: no nausea  Complications: no  Cardiovascular status: blood pressure returned to baseline  Respiratory status: acceptable  Hydration status: euvolemic

## 2020-07-15 NOTE — OP NOTE
PATIENT:    Charley Rodney    PREOPERATIVE DIAGNOSES:  1.  39w0d        2. Previous  and gestational hypertension     POSTOPERATIVE DIAGNOSES:  Same      PROCEDURE:     Repeat low transverse  section.      SURGEON:     Anthony Brunson MD      ESTIMATED BLOOD LOSS:   168 mL.      COMPLICATIONS:     None.         ANESTHESIA:    Spinal.         FINDINGS:   Live male         Infant Apgars 9 and 9 at 1 and 5 min respectively. Weight: 9 lbs 1 oz.        Normal tubes and ovaries bilaterally.         DETAILS OF PROCEDURE:   With the patient in the sitting position, spinal anesthesia was given without any complications. She was then placed in the supine position slightly tilted to the left. Abdomen was scrubbed and draped in the usual manner. Anesthesia level was checked and was found to be adequate. The abdomen was entered thru a transverse incision The Bovie was used to go through the subcutaneous tissue. The fascia was entered in the same plane as the skin incision and  from the underlying rectus abdominis muscles which were  in the midline exposing the parietal peritoneum. The peritoneum was opened sharply in a longitudinal fashion. A bladder retractor was placed in position and the visceral peritoneum overlying the lower uterine segment was opened transversely and a bladder flap was developed in a sharp manner. A Traveler | VIPius OB retractor was placed in position. The lower uterine segment was opened in a low transverse fashion. The amniotic cavity was entered and Clear amniotic fluid was suctioned out. The baby was then delivered from the Cephalic position with the assistance of a vacuum extractor without any complications. The baby was handed over to the nursery nurse. Cord blood was saved. The placenta was then removed manually and the endometrial cavity was swept clean by a wet lap.  The edges of the uterine incision were grasped by Allis clamps and the incision itself was closed using a continuous locked suture of 0 monocryl. Tubes and ovaries were inspected and appeared to be normal. The gutters were cleared of any blood clots and debris. The operative field appeared to be hemostatic. The Mobius retractor was removed. Correct needle, sponge and instrument count was reported and the abdomen was then closed in layers using #1 Stratafix for the fascia and the subcuticular stapler for the skin. Steristrips were applied followed by a sterile dressing and the patient was then transferred to the recovery room in good stable condition.     Anthony Brunson

## 2020-07-15 NOTE — ANESTHESIA PRE PROCEDURE
Department of Anesthesiology  Preprocedure Note       Name:  Roland Kim   Age:  20 y.o.  :  2000                                          MRN:  [id number]         Date:  7/15/2020      Surgeon: Yolanda Araya):  Quan Cunningham MD    Procedure: Procedure(s):  REPEAT  SECTION    Medications prior to admission:   Prior to Admission medications    Medication Sig Start Date End Date Taking? Authorizing Provider   Prenatal Vit-Fe Fumarate-FA (PRENATAL VITAMINS PO) Take 1 tablet by mouth daily Causing nausea   Yes Historical Provider, MD       Current medications:    Current Facility-Administered Medications   Medication Dose Route Frequency Provider Last Rate Last Dose    lactated ringers infusion   Intravenous Continuous Amine INGRIS Brunson MD        sodium chloride flush 0.9 % injection 10 mL  10 mL Intravenous 2 times per day Amine INGRIS Brunson MD        sodium chloride flush 0.9 % injection 10 mL  10 mL Intravenous PRN Amine INGRIS Brunson MD        cefOXitin in dextrose (MEFOXIN) IVPB Duplex 2 g  2 g Intravenous Once Amine INGRIS Brunson MD           Allergies:     Allergies   Allergen Reactions    Aspirin      Has ITP       Problem List:    Patient Active Problem List   Diagnosis Code    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 [place] [address] Z75.41    History of  delivery affecting pregnancy O34.219    Normal pregnancy in third trimester Z34.93    Gestational hypertension without significant proteinuria in third trimester O13.3    Term pregnancy Z34.90       Past Medical History:        Diagnosis Date    Autoimmune disorder ([facility] [place] [address]     ITP    Disease of blood and blood forming organ     blood clotting issue    Hypertension        Past Surgical History:        Procedure Laterality Date    ARM SURGERY Left      SECTION         Social History:    Social History     Tobacco Use    Smoking HCGQUANT     ABGs: No results found for: PHART, PO2ART, EUI0KUB, BNK9RQQ, BEART, U8DIWBNR     Type & Screen (If Applicable):  No results found for: LABABO, LABRH    Drug/Infectious Status (If Applicable):  No results found for: HIV, HEPCAB    COVID-19 Screening (If Applicable):   Lab Results   Component Value Date    COVID19 Not Detected 07/10/2020         Anesthesia Evaluation  Patient summary reviewed and Nursing notes reviewed no history of anesthetic complications:   Airway: Mallampati: II  TM distance: >3 FB   Neck ROM: full  Mouth opening: > = 3 FB Dental:      Comment: Denies loose or missing teeth      Pulmonary:Negative Pulmonary ROS and normal exam  breath sounds clear to auscultation                             Cardiovascular:  Exercise tolerance: good (>4 METS),   (+) hypertension ( PIH- no medications): moderate,         Rhythm: regular  Rate: normal           Beta Blocker:  Not on Beta Blocker         Neuro/Psych:   Negative Neuro/Psych ROS              GI/Hepatic/Renal:   (+) GERD: poorly controlled,           Endo/Other:    (+) blood dyscrasia (ITP)::., .                 Abdominal:   (+) obese,         Vascular: negative vascular ROS. Anesthesia Plan      spinal     ASA 3     (20G R H)        Anesthetic plan and risks discussed with patient. Plan discussed with CRNA.                   Jessika Peraza RN   7/15/2020

## 2020-07-15 NOTE — H&P
LEUKOCYTESUR MODERATE 2020    UROBILINOGEN 0.2 2020    BILIRUBINUR Negative 2020    BLOODU Negative 2020    GLUCOSEU Negative 2020          Assessment:     Previous  x1  Gestational hypertension    Plan:      Monitored for contractions - findings: uterine irritability and reactive strip. Orders: Repeat .      Anthony Brunson MD,7/15/2020 7:04 AM

## 2020-07-15 NOTE — PROGRESS NOTES
Notified Dr Beverly Beckford of patient C/O itching and being allergic to Benadryl. Instructed that itching will subside to notify him if untolerable.

## 2020-07-16 LAB
HCT VFR BLD CALC: 29.8 % (ref 34–48)
HEMOGLOBIN: 9.3 G/DL (ref 11.5–15.5)

## 2020-07-16 PROCEDURE — 1220000001 HC SEMI PRIVATE L&D R&B

## 2020-07-16 PROCEDURE — 85018 HEMOGLOBIN: CPT

## 2020-07-16 PROCEDURE — 6370000000 HC RX 637 (ALT 250 FOR IP): Performed by: OBSTETRICS & GYNECOLOGY

## 2020-07-16 PROCEDURE — 36415 COLL VENOUS BLD VENIPUNCTURE: CPT

## 2020-07-16 PROCEDURE — 85014 HEMATOCRIT: CPT

## 2020-07-16 RX ADMIN — FERROUS SULFATE TAB 325 MG (65 MG ELEMENTAL FE) 325 MG: 325 (65 FE) TAB at 09:08

## 2020-07-16 RX ADMIN — OXYCODONE AND ACETAMINOPHEN 1 TABLET: 5; 325 TABLET ORAL at 04:12

## 2020-07-16 RX ADMIN — DOCUSATE SODIUM 100 MG: 100 CAPSULE, LIQUID FILLED ORAL at 21:04

## 2020-07-16 RX ADMIN — OXYCODONE AND ACETAMINOPHEN 1 TABLET: 5; 325 TABLET ORAL at 15:56

## 2020-07-16 RX ADMIN — OXYCODONE AND ACETAMINOPHEN 1 TABLET: 5; 325 TABLET ORAL at 21:07

## 2020-07-16 RX ADMIN — DOCUSATE SODIUM 100 MG: 100 CAPSULE, LIQUID FILLED ORAL at 09:08

## 2020-07-16 RX ADMIN — FERROUS SULFATE TAB 325 MG (65 MG ELEMENTAL FE) 325 MG: 325 (65 FE) TAB at 15:57

## 2020-07-16 ASSESSMENT — PAIN SCALES - GENERAL
PAINLEVEL_OUTOF10: 0
PAINLEVEL_OUTOF10: 4
PAINLEVEL_OUTOF10: 5
PAINLEVEL_OUTOF10: 6
PAINLEVEL_OUTOF10: 6

## 2020-07-16 ASSESSMENT — PAIN DESCRIPTION - PROGRESSION: CLINICAL_PROGRESSION: GRADUALLY IMPROVING

## 2020-07-16 ASSESSMENT — PAIN - FUNCTIONAL ASSESSMENT: PAIN_FUNCTIONAL_ASSESSMENT: ACTIVITIES ARE NOT PREVENTED

## 2020-07-16 ASSESSMENT — PAIN DESCRIPTION - DESCRIPTORS: DESCRIPTORS: ACHING

## 2020-07-16 NOTE — PLAN OF CARE
Problem: Fluid Volume - Imbalance:  Goal: Absence of postpartum hemorrhage signs and symptoms  Description: Absence of postpartum hemorrhage signs and symptoms  7/16/2020 1657 by Marely Cat RN  Outcome: Met This Shift  7/16/2020 0726 by Jane Mcdonald RN  Outcome: Met This Shift     Problem: Mood - Altered:  Goal: Mood stable  Description: Mood stable  Outcome: Met This Shift     Problem: Urinary Retention:  Goal: Urinary elimination within specified parameters  Description: Urinary elimination within specified parameters  Outcome: Met This Shift     Problem: Venous Thromboembolism:  Goal: Will show no signs or symptoms of venous thromboembolism  Description: Will show no signs or symptoms of venous thromboembolism  Outcome: Met This Shift

## 2020-07-16 NOTE — PROGRESS NOTES
Care of patient assumed at bedside. Plan of care reviewed. Patient without questions. Requesting pain med at this time. Vitals and assessment obtained. Patient states discomfort is mostly on r side. Also states having some gas discomfort. Encouraged to get up and ambulate in hallway. Instructed to call for assistance if needed. No needs at this time.

## 2020-07-16 NOTE — PROGRESS NOTES
Assumed care of pt this shift. Plan of care discussed, no questions at this time. Call light in reach.

## 2020-07-16 NOTE — PROGRESS NOTES
Assumed care of pt, RN to bedside. Salas removed and zi care provided. Instructed on need to use call light for help ambulating to bathroom when ready to void. VF discontinued at this time, pt tolerating PO fluids and food. Denies pain or discomfort. FOB supportive at bedside. Call light in reach, instructed to call with needs.

## 2020-07-17 PROCEDURE — 6370000000 HC RX 637 (ALT 250 FOR IP): Performed by: OBSTETRICS & GYNECOLOGY

## 2020-07-17 PROCEDURE — 1220000001 HC SEMI PRIVATE L&D R&B

## 2020-07-17 RX ORDER — SIMETHICONE 80 MG
80 TABLET,CHEWABLE ORAL EVERY 6 HOURS PRN
Status: DISCONTINUED | OUTPATIENT
Start: 2020-07-17 | End: 2020-07-18 | Stop reason: HOSPADM

## 2020-07-17 RX ADMIN — Medication 80 MG: at 08:49

## 2020-07-17 RX ADMIN — FERROUS SULFATE TAB 325 MG (65 MG ELEMENTAL FE) 325 MG: 325 (65 FE) TAB at 17:50

## 2020-07-17 RX ADMIN — DOCUSATE SODIUM 100 MG: 100 CAPSULE, LIQUID FILLED ORAL at 08:49

## 2020-07-17 RX ADMIN — DOCUSATE SODIUM 100 MG: 100 CAPSULE, LIQUID FILLED ORAL at 20:51

## 2020-07-17 RX ADMIN — OXYCODONE AND ACETAMINOPHEN 2 TABLET: 5; 325 TABLET ORAL at 01:16

## 2020-07-17 RX ADMIN — FERROUS SULFATE TAB 325 MG (65 MG ELEMENTAL FE) 325 MG: 325 (65 FE) TAB at 08:49

## 2020-07-17 RX ADMIN — OXYCODONE AND ACETAMINOPHEN 2 TABLET: 5; 325 TABLET ORAL at 11:35

## 2020-07-17 RX ADMIN — OXYCODONE AND ACETAMINOPHEN 1 TABLET: 5; 325 TABLET ORAL at 17:52

## 2020-07-17 ASSESSMENT — PAIN SCALES - GENERAL
PAINLEVEL_OUTOF10: 7
PAINLEVEL_OUTOF10: 7
PAINLEVEL_OUTOF10: 3

## 2020-07-17 ASSESSMENT — PAIN DESCRIPTION - DESCRIPTORS: DESCRIPTORS: CRAMPING;SORE

## 2020-07-17 NOTE — PROGRESS NOTES
Subjective:     Postpartum Day 2:  Delivery    The patient feels well. The patient denies emotional concerns. Pain is well controlled with current medications. The baby is well. Urinary output is adequate. The patient is ambulating well. The patient is tolerating a normal diet. Flatus has been passed. Objective:       Vitals:    20 0735   BP: 130/82   Pulse: 85   Resp: 16   Temp: 98 °F (36.7 °C)   SpO2:          General:    alert, appears stated age and cooperative   Bowel Sounds:  active   Lochia:  appropriate   Uterine Fundus:   firm   Incision:  healing well, no significant drainage, no dehiscence, no significant erythema   DVT Evaluation:  No evidence of DVT seen on physical exam.     CBC   Lab Results   Component Value Date    WBC 11.1 07/15/2020    HGB 9.3 (L) 2020    HCT 29.8 (L) 2020    MCV 78.4 (L) 07/15/2020     07/15/2020        Assessment:     Status post  section. Doing well postoperatively. Plan:     Continue current care.

## 2020-07-17 NOTE — PLAN OF CARE
Problem: Pain:  Goal: Pain level will decrease  Description: Pain level will decrease  Outcome: Met This Shift     Problem: Pain - Acute:  Goal: Pain level will decrease  Description: Pain level will decrease  Outcome: Met This Shift

## 2020-07-17 NOTE — PROGRESS NOTES
Assumed care of patient for this shift. Patient resting in bed. No complaints of discomfort at this time. Plan of care for night discussed, patient verbalizes understanding. Safe sleep policy discussed, patient verbalizes understanding. PO fluids bedside. Call light placed within reach.

## 2020-07-17 NOTE — PROGRESS NOTES
Patient resting comfortably, denies pain, no co's offered. Holding and caring for infant. Plan of care reviewed.

## 2020-07-17 NOTE — PROGRESS NOTES
Subjective:     Postpartum Day 1:  Delivery    The patient feels well. The patient denies emotional concerns. Pain is well controlled with current medications. The baby is well. Urinary output is adequate. The patient is ambulating well. The patient is tolerating a normal diet. Flatus has been passed. Objective:       Vitals:    20 0029   BP: (!) 155/84   Pulse: 82   Resp: 16   Temp: 98 °F (36.7 °C)   SpO2:          General:    alert, appears stated age and cooperative   Bowel Sounds:  active   Lochia:  appropriate   Uterine Fundus:   firm   Incision:  healing well, no significant drainage, no dehiscence, no significant erythema   DVT Evaluation:  No evidence of DVT seen on physical exam.     CBC   Lab Results   Component Value Date    WBC 11.1 07/15/2020    HGB 9.3 (L) 2020    HCT 29.8 (L) 2020    MCV 78.4 (L) 07/15/2020     07/15/2020        Assessment:     Status post  section. Doing well postoperatively. Plan:     Continue current care.

## 2020-07-18 VITALS
WEIGHT: 280 LBS | HEIGHT: 64 IN | OXYGEN SATURATION: 97 % | RESPIRATION RATE: 18 BRPM | SYSTOLIC BLOOD PRESSURE: 132 MMHG | DIASTOLIC BLOOD PRESSURE: 88 MMHG | TEMPERATURE: 98.4 F | HEART RATE: 84 BPM | BODY MASS INDEX: 47.8 KG/M2

## 2020-07-18 PROCEDURE — 90471 IMMUNIZATION ADMIN: CPT | Performed by: OBSTETRICS & GYNECOLOGY

## 2020-07-18 PROCEDURE — 6370000000 HC RX 637 (ALT 250 FOR IP): Performed by: OBSTETRICS & GYNECOLOGY

## 2020-07-18 PROCEDURE — 90715 TDAP VACCINE 7 YRS/> IM: CPT | Performed by: OBSTETRICS & GYNECOLOGY

## 2020-07-18 PROCEDURE — 6360000002 HC RX W HCPCS: Performed by: OBSTETRICS & GYNECOLOGY

## 2020-07-18 RX ORDER — OXYCODONE HYDROCHLORIDE AND ACETAMINOPHEN 5; 325 MG/1; MG/1
1 TABLET ORAL EVERY 6 HOURS PRN
Qty: 20 TABLET | Refills: 0 | Status: SHIPPED | OUTPATIENT
Start: 2020-07-18 | End: 2020-07-23

## 2020-07-18 RX ADMIN — FERROUS SULFATE TAB 325 MG (65 MG ELEMENTAL FE) 325 MG: 325 (65 FE) TAB at 09:40

## 2020-07-18 RX ADMIN — DOCUSATE SODIUM 100 MG: 100 CAPSULE, LIQUID FILLED ORAL at 09:40

## 2020-07-18 RX ADMIN — OXYCODONE AND ACETAMINOPHEN 1 TABLET: 5; 325 TABLET ORAL at 00:09

## 2020-07-18 RX ADMIN — OXYCODONE AND ACETAMINOPHEN 1 TABLET: 5; 325 TABLET ORAL at 05:50

## 2020-07-18 RX ADMIN — OXYCODONE AND ACETAMINOPHEN 2 TABLET: 5; 325 TABLET ORAL at 10:15

## 2020-07-18 RX ADMIN — TETANUS TOXOID, REDUCED DIPHTHERIA TOXOID AND ACELLULAR PERTUSSIS VACCINE, ADSORBED 0.5 ML: 5; 2.5; 8; 8; 2.5 SUSPENSION INTRAMUSCULAR at 10:49

## 2020-07-18 ASSESSMENT — PAIN SCALES - GENERAL
PAINLEVEL_OUTOF10: 4
PAINLEVEL_OUTOF10: 5
PAINLEVEL_OUTOF10: 7

## 2020-07-18 NOTE — PROGRESS NOTES
Subjective:     Postpartum Day 3:  Delivery    The patient feels well. The patient denies emotional concerns. Pain is well controlled with current medications. The baby is well. Urinary output is adequate. The patient is ambulating well. The patient is tolerating a normal diet. Flatus has been passed. Objective:       Vitals:    20 0734   BP: 132/88   Pulse: 84   Resp: 18   Temp: 98.4 °F (36.9 °C)   SpO2:          General:    alert, appears stated age and cooperative   Bowel Sounds:  active   Lochia:  appropriate   Uterine Fundus:   firm   Incision:  healing well, no significant drainage, no dehiscence, no significant erythema   DVT Evaluation:  No evidence of DVT seen on physical exam.     CBC   Lab Results   Component Value Date    WBC 11.1 07/15/2020    HGB 9.3 (L) 2020    HCT 29.8 (L) 2020    MCV 78.4 (L) 07/15/2020     07/15/2020        Assessment:     Status post  section. Doing well postoperatively. Plan:     Continue current care. Discharge home with standard precautions and return to office in 2 weeks.

## 2020-07-18 NOTE — DISCHARGE SUMMARY
Obstetrical Discharge Form         Patients Name  Navid Zapata    Gestational Age:  39w0d    Antepartum complications: Previous , chronic hypertension    Date of Delivery:   7/15/2020       7:38 AM      Type of Delivery:   , Low Transverse [251]   Rupture Date/time:               Presentation:    Vertex [1]   Position:                      Anesthesia:    Spinal [252]     Feeding method:         Delivered By:   Elvia AMADO     Baby:       Information for the patient's :  Vishal Herrera [[id number]]          Intrapartum complications: None  Postpartum complications: none  Discharge Date:   2020  Discharge Condition: Stable    Plan:   Follow up    in 2 weeks

## 2020-07-18 NOTE — PLAN OF CARE
Problem: Pain - Acute:  Goal: Pain level will decrease  Description: Pain level will decrease  Outcome: Met This Shift   Patient verbalized she is satisfied with pain control during this shift. Oral medications were given for pain control.

## 2020-08-12 ENCOUNTER — HOSPITAL ENCOUNTER (EMERGENCY)
Age: 20
Discharge: HOME | End: 2020-08-12
Payer: COMMERCIAL

## 2020-08-12 VITALS
TEMPERATURE: 98.7 F | HEART RATE: 89 BPM | BODY MASS INDEX: 44.63 KG/M2 | RESPIRATION RATE: 16 BRPM | WEIGHT: 260 LBS | DIASTOLIC BLOOD PRESSURE: 90 MMHG | OXYGEN SATURATION: 97 % | SYSTOLIC BLOOD PRESSURE: 133 MMHG

## 2020-08-12 PROCEDURE — 99212 OFFICE O/P EST SF 10 MIN: CPT

## 2020-08-12 RX ORDER — LIDOCAINE HYDROCHLORIDE 20 MG/ML
10 SOLUTION OROPHARYNGEAL PRN
Qty: 150 ML | Refills: 0 | Status: SHIPPED | OUTPATIENT
Start: 2020-08-12 | End: 2021-04-18

## 2020-08-12 RX ORDER — PENICILLIN V POTASSIUM 500 MG/1
500 TABLET ORAL 4 TIMES DAILY
Qty: 40 TABLET | Refills: 0 | Status: SHIPPED | OUTPATIENT
Start: 2020-08-12 | End: 2020-08-22

## 2020-08-12 ASSESSMENT — PAIN SCALES - GENERAL: PAINLEVEL_OUTOF10: 9

## 2020-08-12 ASSESSMENT — PAIN DESCRIPTION - PAIN TYPE: TYPE: ACUTE PAIN

## 2020-08-12 ASSESSMENT — PAIN DESCRIPTION - LOCATION: LOCATION: TEETH

## 2020-08-12 NOTE — ED PROVIDER NOTES
% SOLN SOLUTION    Take 10 mLs by mouth as needed for Irritation Soak 10 mL's of viscous lidocaine and a cotton ball. Tuck the side gums next to area of dental pain. PENICILLIN V POTASSIUM (VEETID) 500 MG TABLET    Take 1 tablet by mouth 4 times daily for 10 days     Electronically signed by SRIKANTH George   DD: 8/12/20  **This report was transcribed using voice recognition software. Every effort was made to ensure accuracy; however, inadvertent computerized transcription errors may be present.   END OF ED PROVIDER NOTE       Riley Arango, [id number]  08/12/20 9955

## 2021-04-18 PROCEDURE — 99285 EMERGENCY DEPT VISIT HI MDM: CPT

## 2021-04-19 ENCOUNTER — APPOINTMENT (OUTPATIENT)
Dept: GENERAL RADIOLOGY | Age: 21
End: 2021-04-19
Payer: COMMERCIAL

## 2021-04-19 ENCOUNTER — HOSPITAL ENCOUNTER (EMERGENCY)
Age: 21
Discharge: HOME | End: 2021-04-19
Attending: EMERGENCY MEDICINE
Payer: COMMERCIAL

## 2021-04-19 VITALS
DIASTOLIC BLOOD PRESSURE: 78 MMHG | OXYGEN SATURATION: 100 % | BODY MASS INDEX: 44.39 KG/M2 | RESPIRATION RATE: 15 BRPM | HEIGHT: 64 IN | TEMPERATURE: 97.3 F | HEART RATE: 78 BPM | SYSTOLIC BLOOD PRESSURE: 131 MMHG | WEIGHT: 260 LBS

## 2021-04-19 DIAGNOSIS — R53.1 GENERALIZED WEAKNESS: Primary | ICD-10-CM

## 2021-04-19 DIAGNOSIS — R00.2 PALPITATIONS: ICD-10-CM

## 2021-04-19 DIAGNOSIS — L30.4 INTERTRIGO: ICD-10-CM

## 2021-04-19 DIAGNOSIS — R42 DIZZINESS: ICD-10-CM

## 2021-04-19 DIAGNOSIS — L73.9 FOLLICULITIS: ICD-10-CM

## 2021-04-19 LAB
ANION GAP SERPL CALCULATED.3IONS-SCNC: 10 MMOL/L (ref 7–16)
BUN BLDV-MCNC: 11 MG/DL (ref 6–20)
CALCIUM SERPL-MCNC: 9.2 MG/DL (ref 8.6–10.2)
CHLORIDE BLD-SCNC: 104 MMOL/L (ref 98–107)
CO2: 24 MMOL/L (ref 22–29)
CREAT SERPL-MCNC: 0.7 MG/DL (ref 0.5–1)
D DIMER: <200 NG/ML DDU
EKG ATRIAL RATE: 71 BPM
EKG P AXIS: 64 DEGREES
EKG P-R INTERVAL: 152 MS
EKG Q-T INTERVAL: 438 MS
EKG QRS DURATION: 114 MS
EKG QTC CALCULATION (BAZETT): 475 MS
EKG R AXIS: 23 DEGREES
EKG T AXIS: 35 DEGREES
EKG VENTRICULAR RATE: 71 BPM
GFR AFRICAN AMERICAN: >60
GFR NON-AFRICAN AMERICAN: >60 ML/MIN/1.73
GLUCOSE BLD-MCNC: 103 MG/DL (ref 74–99)
HCT VFR BLD CALC: 38.5 % (ref 34–48)
HEMOGLOBIN: 12.3 G/DL (ref 11.5–15.5)
MCH RBC QN AUTO: 26.3 PG (ref 26–35)
MCHC RBC AUTO-ENTMCNC: 31.9 % (ref 32–34.5)
MCV RBC AUTO: 82.4 FL (ref 80–99.9)
PDW BLD-RTO: 14 FL (ref 11.5–15)
PLATELET # BLD: 128 E9/L (ref 130–450)
PMV BLD AUTO: 13.4 FL (ref 7–12)
POTASSIUM SERPL-SCNC: 4.1 MMOL/L (ref 3.5–5)
RBC # BLD: 4.67 E12/L (ref 3.5–5.5)
SODIUM BLD-SCNC: 138 MMOL/L (ref 132–146)
T4 TOTAL: 7 MCG/DL (ref 4.5–11.7)
TROPONIN: <0.01 NG/ML (ref 0–0.03)
TSH SERPL DL<=0.05 MIU/L-ACNC: 2.36 UIU/ML (ref 0.27–4.2)
WBC # BLD: 9.3 E9/L (ref 4.5–11.5)

## 2021-04-19 PROCEDURE — 85378 FIBRIN DEGRADE SEMIQUANT: CPT

## 2021-04-19 PROCEDURE — 93005 ELECTROCARDIOGRAM TRACING: CPT | Performed by: EMERGENCY MEDICINE

## 2021-04-19 PROCEDURE — 84436 ASSAY OF TOTAL THYROXINE: CPT

## 2021-04-19 PROCEDURE — 85027 COMPLETE CBC AUTOMATED: CPT

## 2021-04-19 PROCEDURE — 80048 BASIC METABOLIC PNL TOTAL CA: CPT

## 2021-04-19 PROCEDURE — 84443 ASSAY THYROID STIM HORMONE: CPT

## 2021-04-19 PROCEDURE — 2580000003 HC RX 258: Performed by: EMERGENCY MEDICINE

## 2021-04-19 PROCEDURE — 71045 X-RAY EXAM CHEST 1 VIEW: CPT

## 2021-04-19 PROCEDURE — 84484 ASSAY OF TROPONIN QUANT: CPT

## 2021-04-19 RX ORDER — CEPHALEXIN 250 MG/1
500 CAPSULE ORAL 3 TIMES DAILY
Qty: 60 CAPSULE | Refills: 0 | Status: SHIPPED | OUTPATIENT
Start: 2021-04-19 | End: 2021-12-09

## 2021-04-19 RX ORDER — 0.9 % SODIUM CHLORIDE 0.9 %
1000 INTRAVENOUS SOLUTION INTRAVENOUS ONCE
Status: COMPLETED | OUTPATIENT
Start: 2021-04-19 | End: 2021-04-19

## 2021-04-19 RX ORDER — NYSTATIN 10B UNIT
POWDER (EA) MISCELLANEOUS
Qty: 1 EACH | Refills: 0 | Status: SHIPPED | OUTPATIENT
Start: 2021-04-19 | End: 2021-12-09

## 2021-04-19 RX ADMIN — SODIUM CHLORIDE 1000 ML: 9 INJECTION, SOLUTION INTRAVENOUS at 02:13

## 2021-04-19 ASSESSMENT — ENCOUNTER SYMPTOMS
EYE DISCHARGE: 0
SHORTNESS OF BREATH: 0
DIARRHEA: 0
SINUS PRESSURE: 0
VOMITING: 0
NAUSEA: 0
BACK PAIN: 0
EYE PAIN: 0
COUGH: 0
SORE THROAT: 0
EYE REDNESS: 0
WHEEZING: 0
ABDOMINAL DISTENTION: 0

## 2021-04-19 ASSESSMENT — PAIN SCALES - GENERAL: PAINLEVEL_OUTOF10: 0

## 2021-04-19 NOTE — ED NOTES
Discharge instructions discussed with patient. Medications reviewed. Questions and concerns addressed. Patient signs discharge form.       Hipolito Harmon RN  04/19/21 2375

## 2021-04-19 NOTE — ED PROVIDER NOTES
Patient is a 24 y/o female who presents to the ED with fatigue, generalized weakness, palpitations, shortness of breath and dizziness. Patient [place] She states that when she arrived back at her car, she noticed her face was flushed. She states that she also feels fatigued and weak in her arms and legs. She has felt her heart racing and becomes dizzy and feels like she is going to pass out. This occurs when she stands up. She denies any chest pain. She has felt short of breath. She denies any recent fever or cough. She also states that she had a spontaneous nose bleed yesterday. She denies any history of nose bleeds. She states that she has a history of ITP. She denies any other sources of bleeding or petechial rash. Review of Systems   Constitutional: Positive for fatigue. Negative for chills and fever. HENT: Negative for ear pain, sinus pressure and sore throat. Eyes: Negative for pain, discharge and redness. Respiratory: Negative for cough, shortness of breath and wheezing. Cardiovascular: Positive for palpitations. Negative for chest pain. Gastrointestinal: Negative for abdominal distention, diarrhea, nausea and vomiting. Genitourinary: Negative for dysuria and frequency. Musculoskeletal: Negative for arthralgias and back pain. Skin: Negative for rash and wound. Neurological: Positive for dizziness and weakness. Negative for headaches. Hematological: Negative for adenopathy. All other systems reviewed and are negative. Physical Exam  Vitals signs and nursing note reviewed. Constitutional:       General: She is not in acute distress. HENT:      Head: Normocephalic and atraumatic.       Right Ear: External ear normal.      Left Ear: External ear normal.      Nose: Nose normal.      Mouth/Throat:      Mouth: Mucous membranes are moist.   Eyes:      Conjunctiva/sclera: Conjunctivae normal.      Pupils: Pupils are equal, round, and reactive to light. Neck:      Musculoskeletal: Normal range of motion and neck supple. Cardiovascular:      Rate and Rhythm: Normal rate and regular rhythm. Heart sounds: No murmur. Pulmonary:      Effort: Pulmonary effort is normal. No respiratory distress. Breath sounds: Normal breath sounds. No stridor. No wheezing, rhonchi or rales. Abdominal:      General: Bowel sounds are normal. There is no distension. Palpations: Abdomen is soft. Tenderness: There is no abdominal tenderness. There is no guarding. Musculoskeletal: Normal range of motion. Skin:     General: Skin is warm and dry. Findings: Rash present. Comments: Circular pustular lesions noted bilateral breasts and abdominal wall consistent with folliculitis. Erythematous patch beneath left breast consistent with intertrigo. Neurological:      Mental Status: She is alert and oriented to person, place, and time. Procedures     MDM           EKG:  Normal sinus rhythm with ventricular rate of 71. UT interval, QRS duration and QT interval within normal range. Normal axis. No ST segment abnormalities to suggest acute ischemia.         --------------------------------------------- PAST HISTORY ---------------------------------------------  Past Medical History:  has a past medical history of Autoimmune disorder ([facility] [place] [address]  delivery delivered, Disease of blood and blood forming organ, and Hypertension. Past Surgical History:  has a past surgical history that includes  section; Arm Surgery (Left); and  section (N/A, 7/15/2020). Social History:  reports that she has been smoking. She has never used smokeless tobacco. She reports that she does not drink alcohol or use drugs. Family History: family history includes Heart Disease in her maternal grandfather; Thyroid Disease in her mother. The patients home medications have been reviewed.     Allergies: Aspirin    -------------------------------------------------- RESULTS -------------------------------------------------  Labs:  Results for orders placed or performed during the hospital encounter of 03/36/76   Basic metabolic panel   Result Value Ref Range    Sodium 138 132 - 146 mmol/L    Potassium 4.1 3.5 - 5.0 mmol/L    Chloride 104 98 - 107 mmol/L    CO2 24 22 - 29 mmol/L    Anion Gap 10 7 - 16 mmol/L    Glucose 103 (H) 74 - 99 mg/dL    BUN 11 6 - 20 mg/dL    CREATININE 0.7 0.5 - 1.0 mg/dL    GFR Non-African American >60 >=60 mL/min/1.73    GFR African American >60     Calcium 9.2 8.6 - 10.2 mg/dL   CBC   Result Value Ref Range    WBC 9.3 4.5 - 11.5 E9/L    RBC 4.67 3.50 - 5.50 E12/L    Hemoglobin 12.3 11.5 - 15.5 g/dL    Hematocrit 38.5 34.0 - 48.0 %    MCV 82.4 80.0 - 99.9 fL    MCH 26.3 26.0 - 35.0 pg    MCHC 31.9 (L) 32.0 - 34.5 %    RDW 14.0 11.5 - 15.0 fL    Platelets 011 (L) 377 - 450 E9/L    MPV 13.4 (H) 7.0 - 12.0 fL   Troponin   Result Value Ref Range    Troponin <0.01 0.00 - 0.03 ng/mL   TSH without Reflex   Result Value Ref Range    TSH 2.360 0.270 - 4.200 uIU/mL   T4   Result Value Ref Range    T4, Total 7.0 4.5 - 11.7 mcg/dL   D-Dimer, Quantitative   Result Value Ref Range    D-Dimer, Quant <200 ng/mL DDU   EKG 12 Lead   Result Value Ref Range    Ventricular Rate 71 BPM    Atrial Rate 71 BPM    P-R Interval 152 ms    QRS Duration 114 ms    Q-T Interval 438 ms    QTc Calculation (Bazett) 475 ms    P Axis 64 degrees    R Axis 23 degrees    T Axis 35 degrees       Radiology:  XR CHEST PORTABLE   Final Result   No acute cardiopulmonary disease.             ------------------------- NURSING NOTES AND VITALS REVIEWED ---------------------------  Date / Time Roomed:  4/19/2021 12:33 AM  ED Bed Assignment:  19/19    The nursing notes within the ED encounter and vital signs as below have been reviewed.    BP (!) 141/93   Pulse 71   Temp 97.3 °F (36.3 °C) (Infrared)   Resp 18   Ht 5' 4\" (1.626 m)   Wt 260 lb (117.9 kg)   SpO2 100%   BMI 44.63 kg/m²   Oxygen Saturation Interpretation: Normal      ------------------------------------------ PROGRESS NOTES ------------------------------------------  I have spoken with the patient and discussed todays results, in addition to providing specific details for the plan of care and counseling regarding the diagnosis and prognosis. Their questions are answered at this time and they are agreeable with the plan. I discussed at length with them reasons for immediate return here for re evaluation. They will followup with primary care by calling their office tomorrow. --------------------------------- ADDITIONAL PROVIDER NOTES ---------------------------------  At this time the patient is without objective evidence of an acute process requiring hospitalization or inpatient management. They have remained hemodynamically stable throughout their entire ED visit and are stable for discharge with outpatient follow-up. The plan has been discussed in detail and they are aware of the specific conditions for emergent return, as well as the importance of follow-up. New Prescriptions    CEPHALEXIN (KEFLEX) 250 MG CAPSULE    Take 2 capsules by mouth 3 times daily    NYSTATIN (MYCOSTATIN) POWD POWDER    Apply beneath breasts twice daily. Diagnosis:  1. Generalized weakness    2. Palpitations    3. Dizziness    4. Folliculitis    5. Intertrigo        Disposition:  Patient's disposition: Discharge to home  Patient's condition is stable.          Byron Doty DO  04/19/21 9526

## 2021-10-11 ENCOUNTER — HOSPITAL ENCOUNTER (EMERGENCY)
Age: 21
Discharge: HOME | End: 2021-10-11
Payer: COMMERCIAL

## 2021-10-11 ENCOUNTER — APPOINTMENT (OUTPATIENT)
Dept: GENERAL RADIOLOGY | Age: 21
End: 2021-10-11
Payer: COMMERCIAL

## 2021-10-11 VITALS
HEART RATE: 98 BPM | OXYGEN SATURATION: 98 % | DIASTOLIC BLOOD PRESSURE: 93 MMHG | TEMPERATURE: 98.9 F | RESPIRATION RATE: 20 BRPM | SYSTOLIC BLOOD PRESSURE: 153 MMHG

## 2021-10-11 DIAGNOSIS — S93.601A SPRAIN OF RIGHT FOOT, INITIAL ENCOUNTER: ICD-10-CM

## 2021-10-11 DIAGNOSIS — S93.402A SPRAIN OF LEFT ANKLE, UNSPECIFIED LIGAMENT, INITIAL ENCOUNTER: Primary | ICD-10-CM

## 2021-10-11 PROCEDURE — 73610 X-RAY EXAM OF ANKLE: CPT

## 2021-10-11 PROCEDURE — L4350 ANKLE CONTROL ORTHO PRE OTS: HCPCS

## 2021-10-11 PROCEDURE — 99211 OFF/OP EST MAY X REQ PHY/QHP: CPT

## 2021-10-11 PROCEDURE — 73630 X-RAY EXAM OF FOOT: CPT

## 2021-10-11 RX ORDER — TRAMADOL HYDROCHLORIDE 50 MG/1
50 TABLET ORAL EVERY 6 HOURS PRN
Qty: 12 TABLET | Refills: 0 | Status: SHIPPED | OUTPATIENT
Start: 2021-10-11 | End: 2021-10-14

## 2021-10-11 ASSESSMENT — PAIN SCALES - GENERAL: PAINLEVEL_OUTOF10: 9

## 2021-10-11 ASSESSMENT — PAIN DESCRIPTION - LOCATION: LOCATION: ANKLE;FOOT

## 2021-10-11 ASSESSMENT — PAIN DESCRIPTION - ORIENTATION: ORIENTATION: LEFT;RIGHT

## 2021-10-11 NOTE — Clinical Note
Sylwia Núñez was seen and treated in our emergency department on 10/11/2021. She may return to work on 10/13/2021. If you have any questions or concerns, please don't hesitate to call.       Jb Lyon, APRN - CNP

## 2021-10-12 ENCOUNTER — OFFICE VISIT (OUTPATIENT)
Dept: ORTHOPEDIC SURGERY | Age: 21
End: 2021-10-12
Payer: COMMERCIAL

## 2021-10-12 VITALS — TEMPERATURE: 98 F | WEIGHT: 230 LBS | HEIGHT: 64 IN | BODY MASS INDEX: 39.27 KG/M2

## 2021-10-12 DIAGNOSIS — S82.892A CLOSED FRACTURE OF LEFT ANKLE, INITIAL ENCOUNTER: ICD-10-CM

## 2021-10-12 DIAGNOSIS — S93.621A SPRAIN OF LIGAMENT OF TARSOMETATARSAL JOINT OF RIGHT FOOT, INITIAL ENCOUNTER: Primary | ICD-10-CM

## 2021-10-12 PROCEDURE — G8484 FLU IMMUNIZE NO ADMIN: HCPCS | Performed by: ORTHOPAEDIC SURGERY

## 2021-10-12 PROCEDURE — G8417 CALC BMI ABV UP PARAM F/U: HCPCS | Performed by: ORTHOPAEDIC SURGERY

## 2021-10-12 PROCEDURE — G8427 DOCREV CUR MEDS BY ELIG CLIN: HCPCS | Performed by: ORTHOPAEDIC SURGERY

## 2021-10-12 PROCEDURE — 4004F PT TOBACCO SCREEN RCVD TLK: CPT | Performed by: ORTHOPAEDIC SURGERY

## 2021-10-12 PROCEDURE — 99214 OFFICE O/P EST MOD 30 MIN: CPT | Performed by: ORTHOPAEDIC SURGERY

## 2021-10-12 RX ORDER — HYDROCODONE BITARTRATE AND ACETAMINOPHEN 5; 325 MG/1; MG/1
1 TABLET ORAL EVERY 8 HOURS PRN
Qty: 21 TABLET | Refills: 0 | Status: SHIPPED | OUTPATIENT
Start: 2021-10-12 | End: 2021-10-19

## 2021-10-12 NOTE — ED PROVIDER NOTES
Department of Emergency . [facility]  Provider Note  Admit Date/Time: 10/11/2021  7:29 PM  Room:   NAME: Shelly Rodgers  : 2000  MRN: [id number]     Chief Complaint:  Ankle Pain (left ankle pain . pt rolled her ankle stepping off a porch) and Foot Injury (right foot injury. pt states that she injured it today on a step)    History of Present Illness        Shelly Rodgers is a 24 y.o. 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 This happened earlier today. She has no other injuries. ROS    Pertinent positives and negatives are stated within HPI, all other systems reviewed and are negative. Past Surgical History:   Procedure Laterality Date    ARM SURGERY Left      SECTION       SECTION N/A 7/15/2020    REPEAT  SECTION performed by Elayne Sinha MD at [facility] L&D OR   Social History:  reports that she has been smoking. She has never used smokeless tobacco. She reports that she does not drink alcohol and does not use drugs. Family History: family history includes Heart Disease in her maternal grandfather; Thyroid Disease in her mother. Allergies: Aspirin    Physical Exam   Oxygen Saturation Interpretation: Normal.   ED Triage Vitals [10/11/21 192]   BP Temp Temp src Pulse Resp SpO2 Height Weight   (!) 153/93 98.9 °F (37.2 °C) -- 98 20 98 % -- --       Physical Exam  · Constitutional/General: Alert and oriented x3, well appearing, non toxic in NAD  · HEENT:  NC/NT. PERRLA,  Airway patent.   · Neck: Supple, full ROM, non tender to palpation in the midline, no stridor, no crepitus, no meningeal signs  · Respiratory: Lungs clear to auscultation bilaterally, no wheezes, rales, or rhonchi. Not in respiratory distress  · CV:  Regular rate. Regular rhythm. No murmurs, gallops, or rubs. 2+ distal pulses  · Chest: No chest wall tenderness  · GI:  Abdomen Soft, Non tender, Non distended. +BS. No rebound, guarding, or rigidity. No pulsatile masses. · Musculoskeletal: Ankle edema and tenderness over the lateral malleolus area no edema noted in the right foot. Integument: skin warm and dry. No rashes. · Lymphatic: no lymphadenopathy noted  · Neurologic: GCS 15, no focal deficits, symmetric strength 5/5 in the upper and lower extremities bilaterally  · Psychiatric: Normal Affect    Lab / Imaging Results   (All laboratory and radiology results have been personally reviewed by myself)  Labs:  No results found for this visit on 10/11/21. Imaging: All Radiology results interpreted by Radiologist unless otherwise noted. XR FOOT RIGHT (MIN 3 VIEWS)   Final Result   1. Nondisplaced fracture of the distal left fibula. Marked lateral   malleolar soft tissue swelling and small joint effusion. Ankle mortise   appears maintained. 2.  No acute osseous findings seen about the right foot on this exam.  Normal   alignment. There appears to be mild dorsal soft tissue swelling of the mid   to forefoot. RECOMMENDATION:   In the setting of trauma, if there is persistent symptoms on the right and   physical exam warrants a repeat radiograph in 10-14 days could be considered   as occult fractures may not be evident on initial imaging evaluation. XR ANKLE LEFT (MIN 3 VIEWS)   Final Result   1. Nondisplaced fracture of the distal left fibula. Marked lateral   malleolar soft tissue swelling and small joint effusion. Ankle mortise   appears maintained. 2.  No acute osseous findings seen about the right foot on this exam.  Normal   alignment.   There appears to be mild dorsal soft tissue swelling of the mid   to forefoot. RECOMMENDATION:   In the setting of trauma, if there is persistent symptoms on the right and   physical exam warrants a repeat radiograph in 10-14 days could be considered   as occult fractures may not be evident on initial imaging evaluation. ED Course / Medical Decision Making   Medications - No data to display       Consult(s):   None    MDM:   Initially when the patient was here the x-ray or not being read by radiology because they said there was a problem with the computers. Patient decided she did not want to wait any longer. She was placed in a cast on the left and a postop shoe on the right she did not want crutches. I did order her some tramadol for pain and gave her a work excuse advised her to limit weightbearing until the x-rays were read. She should call first thing in the morning for the report. X-ray of the left foot ankle was positive for a nondisplaced fracture of the distal fibula. SHe has an Aircast on advised no weightbearing she does not want crutches. Said she cannot use crutches because her right foot hurts worse in her ankle does not she would not be able to walk at all she has already made an appointment with orthopedics for Dr. Rodriguez Cooper today she will see him and follow-up with him for further recommendations for    P    Assessment      1. Sprain of left ankle, unspecified ligament, initial encounter    2. Sprain of right foot, initial encounter      Plan   Discharge to home and advised to contact call the referral line to get established with a   [phone]  Schedule an appointment as soon as possible for a visit      Patient condition is good    New Medications     Discharge Medication List as of 10/11/2021  9:01 PM      START taking these medications    Details   traMADol (ULTRAM) 50 MG tablet Take 1 tablet by mouth every 6 hours as needed for Pain for up to 3 days. , Disp-12 tablet, R-0Print           Electronically signed by ALONDRA Flores CNP   DD: 10/13/21  **This report was transcribed using voice recognition software. Every effort was made to ensure accuracy; however, inadvertent computerized transcription errors may be present.   END OF ED PROVIDER NOTE     ALONDRA Flores CNP  10/13/21 2017

## 2021-10-12 NOTE — PROGRESS NOTES
Chief Complaint   Patient presents with    Ankle Pain     Left ankle, while walking down stairs missed one step left ankle gave away and landed on right foot, went to Urgent Care [facility] 10/11/2021    Foot Pain     Right foot, while walking down the stairs missed one step and landed on right foot, went to urgent care. [facility] 10/11/2021       Jes Velasco is a 24 y.o. female who presents today with a right foot and left ankle injury. while walking down the stairs missed one step and landed on right foot, went to urgent care. [facility] 10/11/2021. .  The patient complains of pain over left lateral ankle but able to bear weight on it but has great difficulty with right foot on the front part. Patient states she cannot put pressure on the foot. The intensity is 7/10. The pain is described as: sharp. Previous treatment includes:rest, ice, heat, NSAIDs, HEP without much relief. .      Past Medical History:   Diagnosis Date    Autoimmune disorder ([facility] [address]     ITP     delivery delivered 7/15/2020    Disease of blood and blood forming organ     blood clotting issue    Hypertension      Past Surgical History:   Procedure Laterality Date    ARM SURGERY Left      SECTION       SECTION N/A 7/15/2020    REPEAT  SECTION performed by Gurmeet Soriano MD at [facility] L&D OR       Current Outpatient Medications:     HYDROcodone-acetaminophen (NORCO) 5-325 MG per tablet, Take 1 tablet by mouth every 8 hours as needed for Pain for up to 7 days. , Disp: 21 tablet, Rfl: 0    traMADol (ULTRAM) 50 MG tablet, Take 1 tablet by mouth every 6 hours as needed for Pain for up to 3 days. , Disp: 12 tablet, Rfl: 0    cephALEXin (KEFLEX) 250 MG capsule, Take 2 capsules by mouth 3 times daily, Disp: 60 capsule, Rfl: 0    nystatin (MYCOSTATIN) POWD powder, Apply beneath breasts twice daily. , Disp: 1 each, Rfl: 0  Allergies   Allergen Reactions    Aspirin      Has ITP     Social History     Socioeconomic History    Marital status: Single     Spouse name: Not on file    Number of children: Not on file    Years of education: Not on file    Highest education level: Not on file   Occupational History    Not on file   Tobacco Use    Smoking status: Current Some Day Smoker     Last attempt to quit: 2019     Years since quittin.9    Smokeless tobacco: Never Used    Tobacco comment: quit with this pregnancy   Vaping Use    Vaping Use: Former   Substance and Sexual Activity    Alcohol use: Never    Drug use: Never    Sexual activity: Yes     Partners: Male   Other Topics Concern    Not on file   Social History Narrative    Not on file     Social Determinants of Health     Financial Resource Strain:     Difficulty of Paying Living Expenses:    Food Insecurity:     Worried About Running Out of Food in the Last Year:     [address] in the Last Year:    Transportation Needs:     Lack of Transportation (Medical):  Lack of Transportation (Non-Medical):    Physical Activity:     Days of Exercise per Week:     Minutes of Exercise per Session:    Stress:     Feeling of Stress :    Social Connections:     Frequency of Communication with Friends and Family:     Frequency of Social Gatherings with Friends and Family:     Attends [employer] Services:     Active Member of Clubs or Organizations:     Attends Club or Organization Meetings:     Marital Status:    Intimate Partner Violence:     Fear of Current or Ex-Partner:     Emotionally Abused:     Physically Abused:     Sexually Abused:      Family History   Problem Relation Age of Onset    Heart Disease Maternal Grandfather     Thyroid Disease Mother        REVIEW OF SYSTEMS:     General/Constitution:  (-)weight loss, (-)fever, (-)chills, (-)weakness. Skin: (-) rash,(-) psoriasis,(-) eczema, (-)skin cancer.    Musculoskeletal: (-) fractures,  (-) dislocations,(-) collagen vascular disease, (-) fibromyalgia, (-) multiple sclerosis, (-) muscular Lymph:    Upon palpation,  there is no lymphadenopathy noted in bilateral lower extremities. Musculoskeletal:    Gait: antalgic; examination of the nails and digits reveal no cyanosis or clubbing. Knee exam - bilateral knee exam shows;  range of motion of R. Knee is 0 to 140, and L. Knee is 0 to 140. The patient does not have  pain on motion, there is not an effusion, there is not tenderness over the  global region, there are not any masses, there is not ligamentous instability, there is not  deformity noted. Knee exam: the injured knee reveals normal exam, no swelling, tenderness, instability; ligaments intact, FROM. Ankle Exam:    Upon inspection and palpation of the Right ankle,  there is not deformity noted,  moderate swelling, moderate ecchymosis, has pain on palpation of left lateral  malleolus, right distal metatarsal region. ROM R: Limited Secondary to pain; Left ankle : DF20; PF 40;  INV 30, NICKOLAS 10. This exam was compared bilaterally. Right Ankle:   (-) Anterior Drawer ,  (-) Posterior Drawer ,  (-) Squeeze test,  (-) External Rotation, (-) Eversion test , (-) Madrigal Test     Left ankle:   (-) Anterior Drawer ,(-)  Posterior Drawer ,(-) Squeeze test,(-) External Rotation (-) Eversion test, (-) Madrigal Test.      Foot exam- visual inspection reveals warm, good capillary refill, there is not pain to palpation over the metatarsals. ROM inversion/eversion full range of motion, abduction/adduction full range of motion, ROM in MTP/PIP/DIP full range of motion. Xrays:    XR ANKLE LEFT (MIN 3 VIEWS)    Result Date: 10/11/2021  EXAMINATION: THREE XRAY VIEWS OF THE LEFT ANKLE; THREE XRAY VIEWS OF THE RIGHT FOOT 10/11/2021 7:44 pm COMPARISON: None. HISTORY: ORDERING SYSTEM PROVIDED HISTORY: fall TECHNOLOGIST PROVIDED HISTORY: Reason for exam:->fall FINDINGS: LEFT ANKLE: Transversely oriented nondisplaced fracture of the distal left fibula.   There is marked lateral malleolar soft tissue swelling and a joint effusion. The imaged left tibia appears intact. Ankle mortise appears maintained. Talar dome appears intact. Base of the 5th metatarsal appears intact. Osseous mineralization is normal. RIGHT FOOT: Radiographs of the right foot demonstrate no fractures with preserved alignment. No erosive changes. No significant degenerative spurring. Osseous mineralization is normal.  Mild dorsal soft tissue swelling over the mid to forefoot. 1.  Nondisplaced fracture of the distal left fibula. Marked lateral malleolar soft tissue swelling and small joint effusion. Ankle mortise appears maintained. 2.  No acute osseous findings seen about the right foot on this exam.  Normal alignment. There appears to be mild dorsal soft tissue swelling of the mid to forefoot. RECOMMENDATION: In the setting of trauma, if there is persistent symptoms on the right and physical exam warrants a repeat radiograph in 10-14 days could be considered as occult fractures may not be evident on initial imaging evaluation. XR FOOT RIGHT (MIN 3 VIEWS)    Result Date: 10/11/2021  EXAMINATION: THREE XRAY VIEWS OF THE LEFT ANKLE; THREE XRAY VIEWS OF THE RIGHT FOOT 10/11/2021 7:44 pm COMPARISON: None. HISTORY: ORDERING SYSTEM PROVIDED HISTORY: fall TECHNOLOGIST PROVIDED HISTORY: Reason for exam:->fall FINDINGS: LEFT ANKLE: Transversely oriented nondisplaced fracture of the distal left fibula. There is marked lateral malleolar soft tissue swelling and a joint effusion. The imaged left tibia appears intact. Ankle mortise appears maintained. Talar dome appears intact. Base of the 5th metatarsal appears intact. Osseous mineralization is normal. RIGHT FOOT: Radiographs of the right foot demonstrate no fractures with preserved alignment. No erosive changes. No significant degenerative spurring. Osseous mineralization is normal.  Mild dorsal soft tissue swelling over the mid to forefoot.      1.  Nondisplaced fracture of the distal left fibula. Marked lateral malleolar soft tissue swelling and small joint effusion. Ankle mortise appears maintained. 2.  No acute osseous findings seen about the right foot on this exam.  Normal alignment. There appears to be mild dorsal soft tissue swelling of the mid to forefoot. RECOMMENDATION: In the setting of trauma, if there is persistent symptoms on the right and physical exam warrants a repeat radiograph in 10-14 days could be considered as occult fractures may not be evident on initial imaging evaluation. Radiographic findings reviewed with patient      Impression:  Ashish Jackson was seen today for ankle pain and foot pain. Diagnoses and all orders for this visit:    Sprain of ligament of tarsometatarsal joint of right foot, initial encounter  -     CT ANKLE RIGHT WO CONTRAST; Future    Closed fracture of left ankle, initial encounter  -     HYDROcodone-acetaminophen (NORCO) 5-325 MG per tablet; Take 1 tablet by mouth every 8 hours as needed for Pain for up to 7 days. Patient seen examined. X-rays reviewed. Patient sustained injuries to both lower extremities. Patient does have a nondisplaced distal left malleolar fracture which is stable. However patient complains of pain along the Lisfranc region of the right foot concerning for injury here. Plan:Natural history and expected course discussed. Questions answered. Rest, ice, compression, elevation (RICE) therapy. Crutches and instructions provided. Educational materials distributed. Fit with long leg cast boot for right lower extremity for use over next 2-4 weeks. Home exercise plan outlined. OTC analgesics as needed. CT recommended of right foot for evaluation of Lisfranc injury. Patient will continue with cast boot and lace up ankle brace of the left ankle. 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   Patient is recommended to allow for we will cast boot while at home and nonweightbearing. Patient verbalized understanding and will continue with CT scan. In a 15 minute assessment and discussion, patient was provided and fitted for a removable cast boot distributed and applied. She was educated in detail how to use cast boot and the importance of use as well as range of motion and HEP exercises. 25 minutes was spent with patient. 50% or greater was spent counseling the patient.

## 2021-10-12 NOTE — ED NOTES
Post op shoe applied to right foot and ace wrap and stir up splint applied to left ankle.      Brad Moreno LPN  09/81/44 [id number]

## 2021-10-15 ENCOUNTER — OFFICE VISIT (OUTPATIENT)
Dept: ORTHOPEDIC SURGERY | Age: 21
End: 2021-10-15
Payer: COMMERCIAL

## 2021-10-15 VITALS — HEIGHT: 64 IN | TEMPERATURE: 98 F | WEIGHT: 230 LBS | BODY MASS INDEX: 39.27 KG/M2

## 2021-10-15 DIAGNOSIS — S93.621A SPRAIN OF LIGAMENT OF TARSOMETATARSAL JOINT OF RIGHT FOOT, INITIAL ENCOUNTER: Primary | ICD-10-CM

## 2021-10-15 DIAGNOSIS — S82.892A CLOSED FRACTURE OF LEFT ANKLE, INITIAL ENCOUNTER: ICD-10-CM

## 2021-10-15 PROCEDURE — G8427 DOCREV CUR MEDS BY ELIG CLIN: HCPCS | Performed by: ORTHOPAEDIC SURGERY

## 2021-10-15 PROCEDURE — G8417 CALC BMI ABV UP PARAM F/U: HCPCS | Performed by: ORTHOPAEDIC SURGERY

## 2021-10-15 PROCEDURE — 4004F PT TOBACCO SCREEN RCVD TLK: CPT | Performed by: ORTHOPAEDIC SURGERY

## 2021-10-15 PROCEDURE — 99214 OFFICE O/P EST MOD 30 MIN: CPT | Performed by: ORTHOPAEDIC SURGERY

## 2021-10-15 PROCEDURE — G8484 FLU IMMUNIZE NO ADMIN: HCPCS | Performed by: ORTHOPAEDIC SURGERY

## 2021-10-15 NOTE — PROGRESS NOTES
Chief Complaint   Patient presents with    Ankle Pain     RIGHT ANKLE CT RESULTS       Davy Humphrey is a 24 y.o. female who presents today with a right foot and left ankle injury. while walking down the stairs missed one step and landed on right foot, went to urgent care. DOI 10/11/2021. .  The patient complains of pain over left lateral ankle but able to bear weight on it but has great difficulty with right foot on the front part. Patient states she cannot put pressure on the foot. The intensity is 7/10. The pain is described as: sharp. Previous treatment includes:rest, ice, heat, NSAIDs, HEP without much relief. Returns today for right ankle CT results. Past Medical History:   Diagnosis Date    Autoimmune disorder ([facility] [address]     ITP     delivery delivered 7/15/2020    Disease of blood and blood forming organ     blood clotting issue    Hypertension      Past Surgical History:   Procedure Laterality Date    ARM SURGERY Left      SECTION       SECTION N/A 7/15/2020    REPEAT  SECTION performed by Kristen Woody MD at [facility]       Current Outpatient Medications:     cephALEXin (KEFLEX) 250 MG capsule, Take 2 capsules by mouth 3 times daily, Disp: 60 capsule, Rfl: 0    nystatin (MYCOSTATIN) POWD powder, Apply beneath breasts twice daily. , Disp: 1 each, Rfl: 0  Allergies   Allergen Reactions    Aspirin      Has ITP     Social History     Socioeconomic History    Marital status: Single     Spouse name: Not on file    Number of children: Not on file    Years of education: Not on file    Highest education level: Not on file   Occupational History    Not on file   Tobacco Use    Smoking status: Current Some Day Smoker     Last attempt to quit: 2019     Years since quittin.0    Smokeless tobacco: Never Used    Tobacco comment: quit with this pregnancy   Vaping Use    Vaping Use: Former   Substance and Sexual Activity    Alcohol use: Never    Drug use: Never    Sexual activity: Yes     Partners: Male   Other Topics Concern    Not on file   Social History Narrative    Not on file     Social Determinants of Health     Financial Resource Strain:     Difficulty of Paying Living Expenses: Not on file   Food Insecurity:     Worried About Running Out of Food in the Last Year: Not on file    Arron of Food in the Last Year: Not on file   Transportation Needs:     Lack of Transportation (Medical): Not on file    Lack of Transportation (Non-Medical): Not on file   Physical Activity:     Days of Exercise per Week: Not on file    Minutes of Exercise per Session: Not on file   Stress:     Feeling of Stress : Not on file   Social Connections:     Frequency of Communication with Friends and Family: Not on file    Frequency of Social Gatherings with Friends and Family: Not on file    Attends Taoism Services: Not on file    Active Member of [address] [place] [address] [employer] or Organizations: Not on file    Attends Club or Organization Meetings: Not on file    Marital Status: Not on file   Intimate Partner Violence:     Fear of Current or Ex-Partner: Not on file    Emotionally Abused: Not on file    Physically Abused: Not on file    Sexually Abused: Not on file   Housing Stability:     Unable to Pay for Housing in the Last Year: Not on file    Number of [place] in the Last Year: Not on file    Unstable Housing in the Last Year: Not on file     Family History   Problem Relation Age of Onset    Heart Disease Maternal Grandfather     Thyroid Disease Mother        REVIEW OF SYSTEMS:     General/Constitution:  (-)weight loss, (-)fever, (-)chills, (-)weakness. Skin: (-) rash,(-) psoriasis,(-) eczema, (-)skin cancer. Musculoskeletal: (-) fractures,  (-) dislocations,(-) collagen vascular disease, (-) fibromyalgia, (-) multiple sclerosis, (-) muscular dystrophy, (-) RSD,(-) joint pain (-)swelling, (-) joint pain,swelling.   Neurologic: (-) epilepsy, (-)seizures,(-) brain tumor,(-) TIA, (-)stroke, (-)headaches, (-)Parkinson disease,(-) memory loss, (-) LOC. Cardiovascular: (-) Chest pain, (-) swelling in legs/feet, (-) SOB, (-) cramping in legs/feet with walking. Respiratory: (-) SOB, (-) Coughing, (-) night sweats. GI: (-) nausea, (-) vomiting, (-) diarrhea, (-) blood in stool, (-) gastric ulcer. Psychiatric: (-) Depression, (-) Anxiety, (-) bipolar disease, (-) Alzheimer's Disease  Allergic/Immunologic: (-) allergies latex, (-) allergies metal, (-) skin sensitivity. Hematlogic: (-) anemia, (-) blood transfusion, (-) DVT/PE, (-) Clotting disorders      EXAM:      Constitution:  Vitals:    10/15/21 0816   Temp: 98 °F (36.7 °C)         Psycihatric:    The patient is alert and oriented x 3, appears to be stated age and in no distress. Respiratory:    Respiratory effort is not labored. Patient is not gasping. Palpation of the chest reveals no tactile fremitus. Skin:    Upon inspection: the skin appears warm, dry and intact. There is not a previous scar over the affected area. There is not any cellulitis, lymphedema or cutaneous lesions noted in the lower extremities. Upon palpation there is no induration noted. Neurologic:      Motor exam of the lower extremities show ; quadriceps, hamstrings, foot dorsi and plantar flexors intact R.  5/5 and L. 5/5. Deep tendon reflexes are 2/4 at the knees and 2/4 at the ankles with strong extensor hallicus longus motor strength bilaterally. Sensory to both feet is intact to all sensory roots. Cardiovascular: The vascular exam is normal and is well perfused to distal extremities. Distal pulses DP/PT: R. 2+; L. 2+. There is cap refill noted less than two seconds in all digits. There is not edema of the bilateral lower extremities. There is not varicosities noted in the distal extremities. Lymph:    Upon palpation,  there is no lymphadenopathy noted in bilateral lower extremities. Musculoskeletal:    Gait: antalgic; examination of the nails and digits reveal no cyanosis or clubbing. Knee exam - bilateral knee exam shows;  range of motion of R. Knee is 0 to 140, and L. Knee is 0 to 140. The patient does not have  pain on motion, there is not an effusion, there is not tenderness over the  global region, there are not any masses, there is not ligamentous instability, there is not  deformity noted. Knee exam: the injured knee reveals normal exam, no swelling, tenderness, instability; ligaments intact, FROM. Ankle Exam:    Upon inspection and palpation of the Right ankle,  there is not deformity noted,  moderate swelling, moderate ecchymosis, has pain on palpation of left lateral  malleolus, right distal metatarsal region. ROM R: Limited Secondary to pain; Left ankle : DF20; PF 40;  INV 30, NICKOLAS 10. This exam was compared bilaterally. Right Ankle:   (-) Anterior Drawer ,  (-) Posterior Drawer ,  (-) Squeeze test,  (-) External Rotation, (-) Eversion test , (-) Madrigal Test     Left ankle:   (-) Anterior Drawer ,(-)  Posterior Drawer ,(-) Squeeze test,(-) External Rotation (-) Eversion test, (-) Madrigal Test.    no change since last visit. Foot exam- visual inspection reveals warm, good capillary refill, there is not pain to palpation over the metatarsals. ROM inversion/eversion full range of motion, abduction/adduction full range of motion, ROM in MTP/PIP/DIP full range of motion. Xrays:    XR ANKLE LEFT (MIN 3 VIEWS)    Result Date: 10/11/2021  EXAMINATION: THREE XRAY VIEWS OF THE LEFT ANKLE; THREE XRAY VIEWS OF THE RIGHT FOOT 10/11/2021 7:44 pm COMPARISON: None. HISTORY: ORDERING SYSTEM PROVIDED HISTORY: fall TECHNOLOGIST PROVIDED HISTORY: Reason for exam:->fall FINDINGS: LEFT ANKLE: Transversely oriented nondisplaced fracture of the distal left fibula. There is marked lateral malleolar soft tissue swelling and a joint effusion.   The imaged left tibia appears intact. Ankle mortise appears maintained. Talar dome appears intact. Base of the 5th metatarsal appears intact. Osseous mineralization is normal. RIGHT FOOT: Radiographs of the right foot demonstrate no fractures with preserved alignment. No erosive changes. No significant degenerative spurring. Osseous mineralization is normal.  Mild dorsal soft tissue swelling over the mid to forefoot. 1.  Nondisplaced fracture of the distal left fibula. Marked lateral malleolar soft tissue swelling and small joint effusion. Ankle mortise appears maintained. 2.  No acute osseous findings seen about the right foot on this exam.  Normal alignment. There appears to be mild dorsal soft tissue swelling of the mid to forefoot. RECOMMENDATION: In the setting of trauma, if there is persistent symptoms on the right and physical exam warrants a repeat radiograph in 10-14 days could be considered as occult fractures may not be evident on initial imaging evaluation. XR FOOT RIGHT (MIN 3 VIEWS)    Result Date: 10/11/2021  EXAMINATION: THREE XRAY VIEWS OF THE LEFT ANKLE; THREE XRAY VIEWS OF THE RIGHT FOOT 10/11/2021 7:44 pm COMPARISON: None. HISTORY: ORDERING SYSTEM PROVIDED HISTORY: fall TECHNOLOGIST PROVIDED HISTORY: Reason for exam:->fall FINDINGS: LEFT ANKLE: Transversely oriented nondisplaced fracture of the distal left fibula. There is marked lateral malleolar soft tissue swelling and a joint effusion. The imaged left tibia appears intact. Ankle mortise appears maintained. Talar dome appears intact. Base of the 5th metatarsal appears intact. Osseous mineralization is normal. RIGHT FOOT: Radiographs of the right foot demonstrate no fractures with preserved alignment. No erosive changes. No significant degenerative spurring. Osseous mineralization is normal.  Mild dorsal soft tissue swelling over the mid to forefoot. 1.  Nondisplaced fracture of the distal left fibula.   Marked lateral malleolar soft tissue swelling and small joint effusion. Ankle mortise appears maintained. 2.  No acute osseous findings seen about the right foot on this exam.  Normal alignment. There appears to be mild dorsal soft tissue swelling of the mid to forefoot. RECOMMENDATION: In the setting of trauma, if there is persistent symptoms on the right and physical exam warrants a repeat radiograph in 10-14 days could be considered as occult fractures may not be evident on initial imaging evaluation. CT ANKLE RIGHT WO CONTRAST    Result Date: 10/14/2021  EXAMINATION: CT OF THE RIGHT ANKLE WITHOUT CONTRAST 10/14/2021 1:12 pm TECHNIQUE: CT of the right ankle was performed without the administration of intravenous contrast.  Multiplanar reformatted images are provided for review. Dose modulation, iterative reconstruction, and/or weight based adjustment of the mA/kV was utilized to reduce the radiation dose to as low as reasonably achievable. COMPARISON: None. HISTORY ORDERING SYSTEM PROVIDED HISTORY: Sprain of ligament of tarsometatarsal joint of right foot, initial encounter FINDINGS: Bones: A sagittal fracture of the intermediate cuneiform is displaced 1-2 mm, the fracture is incompletely visualized on the study volume. A 13 x 6 mm osteochondral defect lies on the anteromedial talar dome. Osseous morphology and alignment is otherwise normal. A 10 mm osteochondral loose body lies in the posterior recess of the tibiotalar joint. Soft Tissue: There is moderate deep and superficial soft tissue edema along the medial and posterior aspects of the ankle and calcaneus. The distal posterior tibialis tendon appears thickened suggesting tendinopathy/tearing at its insertion on the navicular. The remaining tendons have normal course and caliber. The muscles are well developed and demonstrate appropriate signal / attenuation. Joint:  The ankle mortise is symmetric. The subtalar joints have normal alignment, width, and morphology.      1. Minimally distracted, incompletely evaluated, sagittal fracture of the intermediate cuneiform. 2. 13 x 6 mm osteochondral defect anterior medial talar dome. 3. Suspect distal posterior tibialis tendon tear/tendinopathy. Radiographic findings reviewed with patient      Impression:  Lilly Donnelly was seen today for ankle pain. Diagnoses and all orders for this visit:    Sprain of ligament of tarsometatarsal joint of right foot, initial encounter  -     CT FOOT RIGHT WO CONTRAST; Future    Closed fracture of left ankle, initial encounter          Patient seen examined. X-rays reviewed. Patient sustained injuries to both lower extremities. Patient does have a nondisplaced distal left malleolar fracture which is stable. However patient complains of pain along the Lisfranc region of the right foot concerning for injury here. Plan:Natural history and expected course discussed. Questions answered. Rest, ice, compression, elevation (RICE) therapy. Crutches and instructions provided. Educational materials distributed. Fit with long leg cast boot for right lower extremity for use over next 2-4 weeks. Home exercise plan outlined. OTC analgesics as needed. Patient seen and examined. CT of ankle reviewed with patient in detail. Natural history and course discussed with patient in long discussion  Treatment options discussed with patient in detail including risks and benefits. Patient should do well with conservative management as patient would like to avoid surgery at this time. CT recommended of right foot for evaluation of Lisfranc injury. Patient will continue with cast boot and lace up ankle brace of the left ankle. 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 Patient is recommended to allow for we will cast boot while at home and nonweightbearing. Patient verbalized understanding and will continue with CT scan.       In a 15 minute assessment and discussion, patient was provided and fitted for a removable cast boot distributed and applied. She was educated in detail how to use cast boot and the importance of use as well as range of motion and HEP exercises. 25 minutes was spent with patient. 50% or greater was spent counseling the patient.

## 2021-10-21 NOTE — PROGRESS NOTES
 Alcohol use: Never    Drug use: Never    Sexual activity: Yes     Partners: Male   Other Topics Concern    Not on file   Social History Narrative    Not on file     Social Determinants of Health     Financial Resource Strain:     Difficulty of Paying Living Expenses: Not on file   Food Insecurity:     Worried About Running Out of Food in the Last Year: Not on file    Arron of Food in the Last Year: Not on file   Transportation Needs:     Lack of Transportation (Medical): Not on file    Lack of Transportation (Non-Medical): Not on file   Physical Activity:     Days of Exercise per Week: Not on file    Minutes of Exercise per Session: Not on file   Stress:     Feeling of Stress : Not on file   Social Connections:     Frequency of Communication with Friends and Family: Not on file    Frequency of Social Gatherings with Friends and Family: Not on file    Attends Yarsani Services: Not on file    Active Member of [address] [place] [address] [employer] or Organizations: Not on file    Attends Club or Organization Meetings: Not on file    Marital Status: Not on file   Intimate Partner Violence:     Fear of Current or Ex-Partner: Not on file    Emotionally Abused: Not on file    Physically Abused: Not on file    Sexually Abused: Not on file   Housing Stability:     Unable to Pay for Housing in the Last Year: Not on file    Number of [place] in the Last Year: Not on file    Unstable Housing in the Last Year: Not on file     Family History   Problem Relation Age of Onset    Heart Disease Maternal Grandfather     Thyroid Disease Mother        REVIEW OF SYSTEMS:     General/Constitution:  (-)weight loss, (-)fever, (-)chills, (-)weakness. Skin: (-) rash,(-) psoriasis,(-) eczema, (-)skin cancer. Musculoskeletal: (-) fractures,  (-) dislocations,(-) collagen vascular disease, (-) fibromyalgia, (-) multiple sclerosis, (-) muscular dystrophy, (-) RSD,(-) joint pain (-)swelling, (-) joint pain,swelling.   Neurologic: (-) epilepsy, (-)seizures,(-) brain tumor,(-) TIA, (-)stroke, (-)headaches, (-)Parkinson disease,(-) memory loss, (-) LOC. Cardiovascular: (-) Chest pain, (-) swelling in legs/feet, (-) SOB, (-) cramping in legs/feet with walking. Respiratory: (-) SOB, (-) Coughing, (-) night sweats. GI: (-) nausea, (-) vomiting, (-) diarrhea, (-) blood in stool, (-) gastric ulcer. Psychiatric: (-) Depression, (-) Anxiety, (-) bipolar disease, (-) Alzheimer's Disease  Allergic/Immunologic: (-) allergies latex, (-) allergies metal, (-) skin sensitivity. Hematlogic: (-) anemia, (-) blood transfusion, (-) DVT/PE, (-) Clotting disorders      EXAM:      Constitution:  Vitals:    10/22/21 0807   Temp: 98 °F (36.7 °C)         Psycihatric:    The patient is alert and oriented x 3, appears to be stated age and in no distress. Respiratory:    Respiratory effort is not labored. Patient is not gasping. Palpation of the chest reveals no tactile fremitus. Skin:    Upon inspection: the skin appears warm, dry and intact. There is not a previous scar over the affected area. There is not any cellulitis, lymphedema or cutaneous lesions noted in the lower extremities. Upon palpation there is no induration noted. Neurologic:      Motor exam of the lower extremities show ; quadriceps, hamstrings, foot dorsi and plantar flexors intact R.  5/5 and L. 5/5. Deep tendon reflexes are 2/4 at the knees and 2/4 at the ankles with strong extensor hallicus longus motor strength bilaterally. Sensory to both feet is intact to all sensory roots. Cardiovascular: The vascular exam is normal and is well perfused to distal extremities. Distal pulses DP/PT: R. 2+; L. 2+. There is cap refill noted less than two seconds in all digits. There is not edema of the bilateral lower extremities. There is not varicosities noted in the distal extremities. Lymph:    Upon palpation,  there is no lymphadenopathy noted in bilateral lower extremities. Musculoskeletal:    Gait: antalgic; examination of the nails and digits reveal no cyanosis or clubbing. Knee exam - bilateral knee exam shows;  range of motion of R. Knee is 0 to 140, and L. Knee is 0 to 140. The patient does not have  pain on motion, there is not an effusion, there is not tenderness over the  global region, there are not any masses, there is not ligamentous instability, there is not  deformity noted. Knee exam: the injured knee reveals normal exam, no swelling, tenderness, instability; ligaments intact, FROM. Ankle Exam:    Upon inspection and palpation of the Right ankle,  there is not deformity noted,  moderate swelling, moderate ecchymosis, has pain on palpation of left lateral  malleolus, right distal metatarsal region. ROM R: Limited Secondary to pain; Left ankle : DF20; PF 40;  INV 30, NICKOLAS 10. This exam was compared bilaterally. Right Ankle:   (-) Anterior Drawer ,  (-) Posterior Drawer ,  (-) Squeeze test,  (-) External Rotation, (-) Eversion test , (-) Madrigal Test     Left ankle:   (-) Anterior Drawer ,(-)  Posterior Drawer ,(-) Squeeze test,(-) External Rotation (-) Eversion test, (-) Madrigal Test.      Foot exam- visual inspection reveals warm, good capillary refill, there is not pain to palpation over the metatarsals. ROM inversion/eversion full range of motion, abduction/adduction full range of motion, ROM in MTP/PIP/DIP full range of motion. no change since last visit. Xrays:    XR ANKLE LEFT (MIN 3 VIEWS)    Result Date: 10/11/2021  EXAMINATION: THREE XRAY VIEWS OF THE LEFT ANKLE; THREE XRAY VIEWS OF THE RIGHT FOOT 10/11/2021 7:44 pm COMPARISON: None. HISTORY: ORDERING SYSTEM PROVIDED HISTORY: fall TECHNOLOGIST PROVIDED HISTORY: Reason for exam:->fall FINDINGS: LEFT ANKLE: Transversely oriented nondisplaced fracture of the distal left fibula. There is marked lateral malleolar soft tissue swelling and a joint effusion.   The imaged left tibia appears intact. Ankle mortise appears maintained. Talar dome appears intact. Base of the 5th metatarsal appears intact. Osseous mineralization is normal. RIGHT FOOT: Radiographs of the right foot demonstrate no fractures with preserved alignment. No erosive changes. No significant degenerative spurring. Osseous mineralization is normal.  Mild dorsal soft tissue swelling over the mid to forefoot. 1.  Nondisplaced fracture of the distal left fibula. Marked lateral malleolar soft tissue swelling and small joint effusion. Ankle mortise appears maintained. 2.  No acute osseous findings seen about the right foot on this exam.  Normal alignment. There appears to be mild dorsal soft tissue swelling of the mid to forefoot. RECOMMENDATION: In the setting of trauma, if there is persistent symptoms on the right and physical exam warrants a repeat radiograph in 10-14 days could be considered as occult fractures may not be evident on initial imaging evaluation. XR FOOT RIGHT (MIN 3 VIEWS)    Result Date: 10/11/2021  EXAMINATION: THREE XRAY VIEWS OF THE LEFT ANKLE; THREE XRAY VIEWS OF THE RIGHT FOOT 10/11/2021 7:44 pm COMPARISON: None. HISTORY: ORDERING SYSTEM PROVIDED HISTORY: fall TECHNOLOGIST PROVIDED HISTORY: Reason for exam:->fall FINDINGS: LEFT ANKLE: Transversely oriented nondisplaced fracture of the distal left fibula. There is marked lateral malleolar soft tissue swelling and a joint effusion. The imaged left tibia appears intact. Ankle mortise appears maintained. Talar dome appears intact. Base of the 5th metatarsal appears intact. Osseous mineralization is normal. RIGHT FOOT: Radiographs of the right foot demonstrate no fractures with preserved alignment. No erosive changes. No significant degenerative spurring. Osseous mineralization is normal.  Mild dorsal soft tissue swelling over the mid to forefoot. 1.  Nondisplaced fracture of the distal left fibula.   Marked lateral malleolar soft tissue swelling and small joint effusion. Ankle mortise appears maintained. 2.  No acute osseous findings seen about the right foot on this exam.  Normal alignment. There appears to be mild dorsal soft tissue swelling of the mid to forefoot. RECOMMENDATION: In the setting of trauma, if there is persistent symptoms on the right and physical exam warrants a repeat radiograph in 10-14 days could be considered as occult fractures may not be evident on initial imaging evaluation. CT FOOT RIGHT WO CONTRAST    Result Date: 10/20/2021  EXAMINATION: CT OF THE RIGHT FOOT WITHOUT CONTRAST 10/20/2021 3:04 pm TECHNIQUE: CT of the right foot was performed without the administration of intravenous contrast.  Multiplanar reformatted images are provided for review. Dose modulation, iterative reconstruction, and/or weight based adjustment of the mA/kV was utilized to reduce the radiation dose to as low as reasonably achievable. COMPARISON: None. HISTORY ORDERING SYSTEM PROVIDED HISTORY: Sprain of ligament of tarsometatarsal joint of right foot, initial encounter FINDINGS: There is an acute comminuted multi fragment displaced intra-articular fracture along the mid/lateral aspect of the 1st cuneiform bone. The fracture extends from the articular surface of the 1st cuneiform bone at the navicular-1st cuneiform joint through the articular surface of the 1st cuneiform bone with the articular base of the 1st metatarsal bone. The fracture also extends from the dorsal to the plantar aspect of the 1st cuneiform bone laterally. The fracture is in the area of the interosseous intercuneiform ligament in between the body of the 1st cuneiform and 2nd form bone and also in the area of the plantar stabilizer ligament between the base of the 2nd metatarsal bone and 1st cuneiform bone, and also in the dorsal ligament between the base of the 2nd metatarsal bone and 1st cuneiform bone.  The fracture does not cover specifically the area of the plantar Lisfranc ligament which is related the with the base of the 2nd metatarsal bone. No acute fractures are seen in the 2nd and 3rd cuneiform bones or in the cuboid bone. The there is no fractures in the navicular bone or in the tarsal bone or in the calcaneus. All joint spaces of the foot are intact except by the proximal and distal articular surfaces of the 1st cuneiform bone particular the distal articular surface where multiple small displaced fragments are seen causing asymmetry of the joint space. Soft tissue swelling is present as expected the in the region where the fracture is located, particular in the more deep dorsal and plantar aspect. As described previously in the CT scan of the right ankle of October 14 there is a old osteochondral insult in the medial articular surface of the talus covering an area of a 15 mm in the anterior to posterior diameter by 8.5 mm in the transverse diameter by 8 mm in depth, with a thick sclerotic base and more superficial multiple radha avulsed nonunited detached bone fragments. Some degree of soft tissue swelling and the apparently thickening is seen in the distal posterior tibial tendon proximal to the attachment in the navicular bone which can represent a form of sprain of the tendon. The other tendons appear unremarkable. No soft tissue swelling is seen in the area of the anterior and posterior title fibular and tibiofibular ligaments. The Achilles tendon appear unremarkable. Comminuted multi fragment intra-articular displaced fracture of the mid lateral aspect of the 1st cuneiform bone as above commented. Radiographic findings reviewed with patient      Impression:  [facility] was [facility] today for foot pain. Diagnoses and all orders for this visit:    Sprain of ligament of tarsometatarsal joint of right foot, initial encounter    Closed fracture of left ankle, initial encounter          Patient seen examined. X-rays reviewed.   Patient sustained injuries to both lower extremities. Patient does have a nondisplaced distal left malleolar fracture which is stable. However patient complains of pain along the Lisfranc region of the right foot concerning for injury here. Plan:Natural history and expected course discussed. Questions answered. Rest, ice, compression, elevation (RICE) therapy. Crutches and instructions provided. Educational materials distributed. Fit with long leg cast boot for right lower extremity for use over next 2-4 weeks. Home exercise plan outlined. OTC analgesics as needed. CT recommended of right foot for evaluation of Lisfranc injury. Patient will continue with cast boot and lace up ankle brace of the left ankle. 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 Patient is recommended to allow for we will cast boot while at home and nonweightbearing. Patient verbalized understanding and will continue with CT scan. Patient seen and examined. CT reviewed with patient in detail. Natural history and course discussed with patient in long discussion  Treatment options discussed with patient in detail including risks and benefits. Patient should do well with conservative management as patient would like to avoid surgery at this time. Patient may continue with Dr. Lynne Ley and ankle brace of left ankle distal to the fracture. However patient instructed to maintain nonweightbearing in cast boot of the cuneiform fracture of the right foot. 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 She is to continue with range of motion exercises while cast boot but maintain nonweightbearing mechanical DVT prophylaxis for now. Patient verbalized understanding and wishes to proceed. Follow-up in 2 weeks for recheck. In a 15 minute assessment and discussion, patient was refitted for a removable cast boot readjusted and applied.  She was educated in detail

## 2021-10-22 ENCOUNTER — OFFICE VISIT (OUTPATIENT)
Dept: ORTHOPEDIC SURGERY | Age: 21
End: 2021-10-22
Payer: COMMERCIAL

## 2021-10-22 ENCOUNTER — TELEPHONE (OUTPATIENT)
Dept: ADMINISTRATIVE | Age: 21
End: 2021-10-22

## 2021-10-22 VITALS — HEIGHT: 64 IN | TEMPERATURE: 98 F | WEIGHT: 230 LBS | BODY MASS INDEX: 39.27 KG/M2

## 2021-10-22 DIAGNOSIS — S93.621A SPRAIN OF LIGAMENT OF TARSOMETATARSAL JOINT OF RIGHT FOOT, INITIAL ENCOUNTER: Primary | ICD-10-CM

## 2021-10-22 DIAGNOSIS — S82.892A CLOSED FRACTURE OF LEFT ANKLE, INITIAL ENCOUNTER: ICD-10-CM

## 2021-10-22 PROCEDURE — 28450 TX TARSAL B1 FX W/O MNPJ EA: CPT | Performed by: ORTHOPAEDIC SURGERY

## 2021-10-22 PROCEDURE — 4004F PT TOBACCO SCREEN RCVD TLK: CPT | Performed by: ORTHOPAEDIC SURGERY

## 2021-10-22 PROCEDURE — 28570 TREAT FOOT DISLOCATION: CPT | Performed by: ORTHOPAEDIC SURGERY

## 2021-10-22 PROCEDURE — G8427 DOCREV CUR MEDS BY ELIG CLIN: HCPCS | Performed by: ORTHOPAEDIC SURGERY

## 2021-10-22 PROCEDURE — 97763 ORTHC/PROSTC MGMT SBSQ ENC: CPT | Performed by: ORTHOPAEDIC SURGERY

## 2021-10-22 PROCEDURE — G8484 FLU IMMUNIZE NO ADMIN: HCPCS | Performed by: ORTHOPAEDIC SURGERY

## 2021-10-22 PROCEDURE — 27786 TREATMENT OF ANKLE FRACTURE: CPT | Performed by: ORTHOPAEDIC SURGERY

## 2021-10-22 PROCEDURE — 99214 OFFICE O/P EST MOD 30 MIN: CPT | Performed by: ORTHOPAEDIC SURGERY

## 2021-10-22 PROCEDURE — G8417 CALC BMI ABV UP PARAM F/U: HCPCS | Performed by: ORTHOPAEDIC SURGERY

## 2021-10-22 NOTE — TELEPHONE ENCOUNTER
Patient requesting a new letter for work. She's requesting the office to e-mail her the letter. E-mail: [email or web address]

## 2021-11-02 NOTE — PROGRESS NOTES
Chief Complaint   Patient presents with    Ankle Pain     Bilateral Ankle F/U    Foot Pain     Right Foot F/U        Shadi Pires is a 24 y.o. female who presents today with a right foot and left ankle injury. while walking down the stairs missed one step and landed on right foot, went to urgent care. DOI 10/11/2021. .  The patient complains of pain over left lateral ankle but able to bear weight on it but has great difficulty with right foot on the front part. Patient states she cannot put pressure on the foot. The intensity is 7/10. The pain is described as: sharp. Previous treatment includes:rest, ice, heat, NSAIDs, HEP without much relief. .  Today with improved pain and function overall bilateral lower extremities. .      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 , Disp: 1 each, Rfl: 0  Allergies   Allergen Reactions    Aspirin      Has ITP     Social History     Socioeconomic History    Marital status: Single     Spouse name: Not on file    Number of children: Not on file    Years of education: Not on file    Highest education level: Not on file   Occupational History    Not on file   Tobacco Use    Smoking status: Current Some Day Smoker     Last attempt to quit: 2019     Years since quittin.0    Smokeless tobacco: Never Used    Tobacco comment: quit with this pregnancy   Vaping Use    Vaping Use: Former   Substance and Sexual Activity    Alcohol use: Never    Drug use: Never    Sexual activity: Yes     Partners: Male   Other Topics Concern    Not on file   Social History Narrative    Not on file     Social Determinants of Health     Financial Resource Strain:     Difficulty of Paying Living Expenses: Not on file   Food Insecurity:     Worried About Running Out of Food in the Last Year: Not on file    Arron of Food in the Last Year: Not on file   Transportation Needs:     Lack of Transportation (Medical): Not on file    Lack of Transportation (Non-Medical): Not on file   Physical Activity:     Days of Exercise per Week: Not on file    Minutes of Exercise per Session: Not on file   Stress:     Feeling of Stress : Not on file   Social Connections:     Frequency of Communication with Friends and Family: Not on file    Frequency of Social Gatherings with Friends and Family: Not on file    Attends Restorationist Services: Not on file    Active Member of [employer] or Organizations: Not on file    Attends Club or Organization Meetings: Not on file    Marital Status: Not on file   Intimate Partner Violence:     Fear of Current or Ex-Partner: Not on file    Emotionally Abused: Not on file    Physically Abused: Not on file    Sexually Abused: Not on file   Housing Stability:     Unable to Pay for Housing in the Last Year: Not on file    Number of [place] in the Last Year: Not on file    Unstable Housing in the Last Year: Not on file     Family History   Problem Relation Age of Onset    Heart Disease Maternal Grandfather     Thyroid Disease Mother        REVIEW OF SYSTEMS:     General/Constitution:  (-)weight loss, (-)fever, (-)chills, (-)weakness. Skin: (-) rash,(-) psoriasis,(-) eczema, (-)skin cancer.    Musculoskeletal: (-) fractures,  (-) dislocations,(-) collagen vascular disease, (-) fibromyalgia, (-) multiple sclerosis, (-) muscular dystrophy, (-) RSD,(-) joint pain (-)swelling, (-) joint pain,swelling. Neurologic: (-) epilepsy, (-)seizures,(-) brain tumor,(-) TIA, (-)stroke, (-)headaches, (-)Parkinson disease,(-) memory loss, (-) LOC. Cardiovascular: (-) Chest pain, (-) swelling in legs/feet, (-) SOB, (-) cramping in legs/feet with walking. Respiratory: (-) SOB, (-) Coughing, (-) night sweats. GI: (-) nausea, (-) vomiting, (-) diarrhea, (-) blood in stool, (-) gastric ulcer. Psychiatric: (-) Depression, (-) Anxiety, (-) bipolar disease, (-) Alzheimer's Disease  Allergic/Immunologic: (-) allergies latex, (-) allergies metal, (-) skin sensitivity. Hematlogic: (-) anemia, (-) blood transfusion, (-) DVT/PE, (-) Clotting disorders      EXAM:      Constitution:  Vitals:    11/04/21 1546   Temp: 98 °F (36.7 °C)         Psycihatric:    The patient is alert and oriented x 3, appears to be stated age and in no distress. Respiratory:    Respiratory effort is not labored. Patient is not gasping. Palpation of the chest reveals no tactile fremitus. Skin:    Upon inspection: the skin appears warm, dry and intact. There is not a previous scar over the affected area. There is not any cellulitis, lymphedema or cutaneous lesions noted in the lower extremities. Upon palpation there is no induration noted. Neurologic:      Motor exam of the lower extremities show ; quadriceps, hamstrings, foot dorsi and plantar flexors intact R.  5/5 and L. 5/5. Deep tendon reflexes are 2/4 at the knees and 2/4 at the ankles with strong extensor hallicus longus motor strength bilaterally. Sensory to both feet is intact to all sensory roots. Cardiovascular: The vascular exam is normal and is well perfused to distal extremities. Distal pulses DP/PT: R. 2+; L. 2+. There is cap refill noted less than two seconds in all digits. There is not edema of the bilateral lower extremities. There is not varicosities noted in the distal extremities.       Lymph:    Upon palpation,  there is no lymphadenopathy noted in bilateral lower extremities. Musculoskeletal:    Gait: antalgic; examination of the nails and digits reveal no cyanosis or clubbing. Knee exam - bilateral knee exam shows;  range of motion of R. Knee is 0 to 140, and L. Knee is 0 to 140. The patient does not have  pain on motion, there is not an effusion, there is not tenderness over the  global region, there are not any masses, there is not ligamentous instability, there is not  deformity noted. Knee exam: the injured knee reveals normal exam, no swelling, tenderness, instability; ligaments intact, FROM. Ankle Exam:    Upon inspection and palpation of the Right ankle,  there is not deformity noted,  moderate swelling, moderate ecchymosis, has pain on palpation of left lateral  malleolus, right distal metatarsal region. ROM R: Limited Secondary to pain; Left ankle : DF20; PF 40;  INV 30, NICKOLAS 10. This exam was compared bilaterally. Right Ankle:   (-) Anterior Drawer ,  (-) Posterior Drawer ,  (-) Squeeze test,  (-) External Rotation, (-) Eversion test , (-) Madrigal Test     Left ankle:   (-) Anterior Drawer ,(-)  Posterior Drawer ,(-) Squeeze test,(-) External Rotation (-) Eversion test, (-) Madrigal Test.      Foot exam- visual inspection reveals warm, good capillary refill, there is not pain to palpation over the metatarsals. ROM inversion/eversion full range of motion, abduction/adduction full range of motion, ROM in MTP/PIP/DIP full range of motion. Xrays:    XR ANKLE LEFT (MIN 3 VIEWS)    Result Date: 10/11/2021  EXAMINATION: THREE XRAY VIEWS OF THE LEFT ANKLE; THREE XRAY VIEWS OF THE RIGHT FOOT 10/11/2021 7:44 pm COMPARISON: None. HISTORY: ORDERING SYSTEM PROVIDED HISTORY: fall TECHNOLOGIST PROVIDED HISTORY: Reason for exam:->fall FINDINGS: LEFT ANKLE: Transversely oriented nondisplaced fracture of the distal left fibula. There is marked lateral malleolar soft tissue swelling and a joint effusion.   The imaged left tibia appears intact. Ankle mortise appears maintained. Talar dome appears intact. Base of the 5th metatarsal appears intact. Osseous mineralization is normal. RIGHT FOOT: Radiographs of the right foot demonstrate no fractures with preserved alignment. No erosive changes. No significant degenerative spurring. Osseous mineralization is normal.  Mild dorsal soft tissue swelling over the mid to forefoot. 1.  Nondisplaced fracture of the distal left fibula. Marked lateral malleolar soft tissue swelling and small joint effusion. Ankle mortise appears maintained. 2.  No acute osseous findings seen about the right foot on this exam.  Normal alignment. There appears to be mild dorsal soft tissue swelling of the mid to forefoot. RECOMMENDATION: In the setting of trauma, if there is persistent symptoms on the right and physical exam warrants a repeat radiograph in 10-14 days could be considered as occult fractures may not be evident on initial imaging evaluation. XR FOOT RIGHT (MIN 3 VIEWS)    Result Date: 10/11/2021  EXAMINATION: THREE XRAY VIEWS OF THE LEFT ANKLE; THREE XRAY VIEWS OF THE RIGHT FOOT 10/11/2021 7:44 pm COMPARISON: None. HISTORY: ORDERING SYSTEM PROVIDED HISTORY: fall TECHNOLOGIST PROVIDED HISTORY: Reason for exam:->fall FINDINGS: LEFT ANKLE: Transversely oriented nondisplaced fracture of the distal left fibula. There is marked lateral malleolar soft tissue swelling and a joint effusion. The imaged left tibia appears intact. Ankle mortise appears maintained. Talar dome appears intact. Base of the 5th metatarsal appears intact. Osseous mineralization is normal. RIGHT FOOT: Radiographs of the right foot demonstrate no fractures with preserved alignment. No erosive changes. No significant degenerative spurring. Osseous mineralization is normal.  Mild dorsal soft tissue swelling over the mid to forefoot. 1.  Nondisplaced fracture of the distal left fibula.   Marked lateral malleolar soft tissue swelling and small joint effusion. Ankle mortise appears maintained. 2.  No acute osseous findings seen about the right foot on this exam.  Normal alignment. There appears to be mild dorsal soft tissue swelling of the mid to forefoot. RECOMMENDATION: In the setting of trauma, if there is persistent symptoms on the right and physical exam warrants a repeat radiograph in 10-14 days could be considered as occult fractures may not be evident on initial imaging evaluation. CT FOOT RIGHT WO CONTRAST    Result Date: 10/20/2021  EXAMINATION: CT OF THE RIGHT FOOT WITHOUT CONTRAST 10/20/2021 3:04 pm TECHNIQUE: CT of the right foot was performed without the administration of intravenous contrast.  Multiplanar reformatted images are provided for review. Dose modulation, iterative reconstruction, and/or weight based adjustment of the mA/kV was utilized to reduce the radiation dose to as low as reasonably achievable. COMPARISON: None. HISTORY ORDERING SYSTEM PROVIDED HISTORY: Sprain of ligament of tarsometatarsal joint of right foot, initial encounter FINDINGS: There is an acute comminuted multi fragment displaced intra-articular fracture along the mid/lateral aspect of the 1st cuneiform bone. The fracture extends from the articular surface of the 1st cuneiform bone at the navicular-1st cuneiform joint through the articular surface of the 1st cuneiform bone with the articular base of the 1st metatarsal bone. The fracture also extends from the dorsal to the plantar aspect of the 1st cuneiform bone laterally. The fracture is in the area of the interosseous intercuneiform ligament in between the body of the 1st cuneiform and 2nd form bone and also in the area of the plantar stabilizer ligament between the base of the 2nd metatarsal bone and 1st cuneiform bone, and also in the dorsal ligament between the base of the 2nd metatarsal bone and 1st cuneiform bone.  The fracture does not cover specifically the area of the plantar Lisfranc ligament which is related the with the base of the 2nd metatarsal bone. No acute fractures are seen in the 2nd and 3rd cuneiform bones or in the cuboid bone. The there is no fractures in the navicular bone or in the tarsal bone or in the calcaneus. All joint spaces of the foot are intact except by the proximal and distal articular surfaces of the 1st cuneiform bone particular the distal articular surface where multiple small displaced fragments are seen causing asymmetry of the joint space. Soft tissue swelling is present as expected the in the region where the fracture is located, particular in the more deep dorsal and plantar aspect. As described previously in the CT scan of the right ankle of October 14 there is a old osteochondral insult in the medial articular surface of the talus covering an area of a 15 mm in the anterior to posterior diameter by 8.5 mm in the transverse diameter by 8 mm in depth, with a thick sclerotic base and more superficial multiple radha avulsed nonunited detached bone fragments. Some degree of soft tissue swelling and the apparently thickening is seen in the distal posterior tibial tendon proximal to the attachment in the navicular bone which can represent a form of sprain of the tendon. The other tendons appear unremarkable. No soft tissue swelling is seen in the area of the anterior and posterior title fibular and tibiofibular ligaments. The Achilles tendon appear unremarkable. Comminuted multi fragment intra-articular displaced fracture of the mid lateral aspect of the 1st cuneiform bone as above commented.      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 No widening of the ankle mortise. Normal ankle soft tissues. Comminuted fracture at the medial cuneiform is redemonstrated. No clear fracture healing. Left ankle: Bony resorption along the fracture plane at the lateral malleolus is compatible with early healing. There is some persistent soft tissue swelling laterally and anteriorly. No widening of the ankle mortise. No new abnormal findings. Right ankle and foot. Stable comminuted fracture of the medial cuneiform. Osteochondritis dissecans at the medial talar dome. Left ankle: Early healing at lateral malleolar fracture. Soft tissue swelling. 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 No widening of the ankle mortise. Normal ankle soft tissues. Comminuted fracture at the medial cuneiform is redemonstrated. No clear fracture healing. Left ankle: Bony resorption along the fracture plane at the lateral malleolus is compatible with early healing. There is some persistent soft tissue swelling laterally and anteriorly. No widening of the ankle mortise. No new abnormal findings. Right ankle and foot. Stable comminuted fracture of the medial cuneiform. Osteochondritis dissecans at the medial talar dome. Left ankle: Early healing at lateral malleolar fracture. Soft tissue swelling. 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 No widening of the ankle mortise. Normal ankle soft tissues. Comminuted fracture at the medial cuneiform is redemonstrated. No clear fracture healing. Left ankle: Bony resorption along the fracture plane at the lateral malleolus is compatible with early healing. There is some persistent soft tissue swelling laterally and anteriorly. No widening of the ankle mortise. No new abnormal findings. Right ankle and foot. Stable comminuted fracture of the medial cuneiform. Osteochondritis dissecans at the medial talar dome. Left ankle: Early healing at lateral malleolar fracture. Soft tissue swelling. Radiographic findings reviewed with patient      Impression:  Cindy Church was seen today for ankle pain and foot pain. Diagnoses and all orders for this visit:    Sprain of ligament of tarsometatarsal joint of right foot, initial encounter    Closed fracture of left ankle, initial encounter          Patient seen examined. X-rays reviewed. Patient sustained injuries to both lower extremities. Patient does have a nondisplaced distal left malleolar fracture which is stable. However patient complains of pain along the Lisfranc region of the right foot concerning for injury here. Plan:Natural history and expected course discussed. Questions answered. Rest, ice, compression, elevation (RICE) therapy. Crutches and instructions provided.   Educational materials distributed. Fit with long leg cast boot for right lower extremity for use over next 2-4 weeks. Home exercise plan outlined. OTC analgesics as needed. CT recommended of right foot for evaluation of Lisfranc injury. Patient will continue with cast boot and lace up ankle brace of the left ankle. 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 Patient is recommended to allow for we will cast boot while at home and nonweightbearing. In a 15 minute assessment and discussion, patient was refitted for a removable cast boot readjusted and applied. She was educated in detail how to use cast boot and the importance of use as well as range of motion and HEP exercises. 25 minutes was spent with patient. 50% or greater was spent counseling the patient.

## 2021-11-04 ENCOUNTER — OFFICE VISIT (OUTPATIENT)
Dept: ORTHOPEDIC SURGERY | Age: 21
End: 2021-11-04
Payer: COMMERCIAL

## 2021-11-04 VITALS — HEIGHT: 64 IN | BODY MASS INDEX: 39.27 KG/M2 | WEIGHT: 230 LBS | TEMPERATURE: 98 F

## 2021-11-04 DIAGNOSIS — S82.892A CLOSED FRACTURE OF LEFT ANKLE, INITIAL ENCOUNTER: ICD-10-CM

## 2021-11-04 DIAGNOSIS — S93.621A SPRAIN OF LIGAMENT OF TARSOMETATARSAL JOINT OF RIGHT FOOT, INITIAL ENCOUNTER: ICD-10-CM

## 2021-11-04 DIAGNOSIS — S93.621A SPRAIN OF LIGAMENT OF TARSOMETATARSAL JOINT OF RIGHT FOOT, INITIAL ENCOUNTER: Primary | ICD-10-CM

## 2021-11-04 DIAGNOSIS — S82.892A CLOSED FRACTURE OF LEFT ANKLE, INITIAL ENCOUNTER: Primary | ICD-10-CM

## 2021-11-04 PROCEDURE — 99024 POSTOP FOLLOW-UP VISIT: CPT | Performed by: ORTHOPAEDIC SURGERY

## 2021-11-04 PROCEDURE — 97763 ORTHC/PROSTC MGMT SBSQ ENC: CPT | Performed by: ORTHOPAEDIC SURGERY

## 2021-12-08 DIAGNOSIS — S82.892A CLOSED FRACTURE OF LEFT ANKLE, INITIAL ENCOUNTER: Primary | ICD-10-CM

## 2021-12-08 DIAGNOSIS — S93.621A SPRAIN OF LIGAMENT OF TARSOMETATARSAL JOINT OF RIGHT FOOT, INITIAL ENCOUNTER: ICD-10-CM

## 2021-12-09 ENCOUNTER — OFFICE VISIT (OUTPATIENT)
Dept: ORTHOPEDIC SURGERY | Age: 21
End: 2021-12-09

## 2021-12-09 VITALS — BODY MASS INDEX: 39.27 KG/M2 | WEIGHT: 230 LBS | HEIGHT: 64 IN

## 2021-12-09 DIAGNOSIS — S82.892A CLOSED FRACTURE OF LEFT ANKLE, INITIAL ENCOUNTER: Primary | ICD-10-CM

## 2021-12-09 DIAGNOSIS — S93.621A SPRAIN OF LIGAMENT OF TARSOMETATARSAL JOINT OF RIGHT FOOT, INITIAL ENCOUNTER: ICD-10-CM

## 2021-12-09 PROCEDURE — 99024 POSTOP FOLLOW-UP VISIT: CPT | Performed by: ORTHOPAEDIC SURGERY

## 2021-12-19 NOTE — PROGRESS NOTES
Chief Complaint   Patient presents with    Ankle Pain     Left ankle is doing well. Right ankle and foot has been painful. She has been compliant with the boot, pain is mostly in the foot. Sim Tapia is a 24 y.o. female who presents today with a right foot and left ankle injury. while walking down the stairs missed one step and landed on right foot, went to urgent care. DOI 10/11/2021. .  The patient complains of pain over left lateral ankle but able to bear weight on it but has great difficulty with right foot on the front part. Patient states she cannot put pressure on the foot. The intensity is 7/10. The pain is described as: sharp. Previous treatment includes:rest, ice, heat, NSAIDs, HEP without much relief. Today with improved pain and function overall bilateral lower extremities. Past Medical History:   Diagnosis Date    Autoimmune disorder ([facility] [address]     ITP     delivery delivered 7/15/2020    Disease of blood and blood forming organ     blood clotting issue    Hypertension      Past Surgical History:   Procedure Laterality Date    ARM SURGERY Left      SECTION       SECTION N/A 7/15/2020    REPEAT  SECTION performed by Anders Morales MD at [address] [place] [address] L&D OR     No current outpatient medications on file.   Allergies   Allergen Reactions    Aspirin      Has ITP     Social History     Socioeconomic History    Marital status: Single     Spouse name: Not on file    Number of children: Not on file    Years of education: Not on file    Highest education level: Not on file   Occupational History    Not on file   Tobacco Use    Smoking status: Current Some Day Smoker     Last attempt to quit: 2019     Years since quittin.1    Smokeless tobacco: Never Used    Tobacco comment: quit with this pregnancy   Vaping Use    Vaping Use: Former   Substance and Sexual Activity    Alcohol use: Never    Drug use: Never    Sexual activity: Yes Partners: Male   Other Topics Concern    Not on file   Social History Narrative    Not on file     Social Determinants of Health     Financial Resource Strain:     Difficulty of Paying Living Expenses: Not on file   Food Insecurity:     Worried About Running Out of Food in the Last Year: Not on file    Arron of Food in the Last Year: Not on file   Transportation Needs:     Lack of Transportation (Medical): Not on file    Lack of Transportation (Non-Medical): Not on file   Physical Activity:     Days of Exercise per Week: Not on file    Minutes of Exercise per Session: Not on file   Stress:     Feeling of Stress : Not on file   Social Connections:     Frequency of Communication with Friends and Family: Not on file    Frequency of Social Gatherings with Friends and Family: Not on file    Attends Orthodox Services: Not on file    Active Member of [address] [place] [address] [employer] or Organizations: Not on file    Attends Club or Organization Meetings: Not on file    Marital Status: Not on file   Intimate Partner Violence:     Fear of Current or Ex-Partner: Not on file    Emotionally Abused: Not on file    Physically Abused: Not on file    Sexually Abused: Not on file   Housing Stability:     Unable to Pay for Housing in the Last Year: Not on file    Number of [place] in the Last Year: Not on file    Unstable Housing in the Last Year: Not on file     Family History   Problem Relation Age of Onset    Heart Disease Maternal Grandfather     Thyroid Disease Mother        REVIEW OF SYSTEMS:     General/Constitution:  (-)weight loss, (-)fever, (-)chills, (-)weakness. Skin: (-) rash,(-) psoriasis,(-) eczema, (-)skin cancer. Musculoskeletal: (-) fractures,  (-) dislocations,(-) collagen vascular disease, (-) fibromyalgia, (-) multiple sclerosis, (-) muscular dystrophy, (-) RSD,(-) joint pain (-)swelling, (-) joint pain,swelling.   Neurologic: (-) epilepsy, (-)seizures,(-) brain tumor,(-) TIA, (-)stroke, (-)headaches, (-)Parkinson disease,(-) memory loss, (-) LOC. Cardiovascular: (-) Chest pain, (-) swelling in legs/feet, (-) SOB, (-) cramping in legs/feet with walking. Respiratory: (-) SOB, (-) Coughing, (-) night sweats. GI: (-) nausea, (-) vomiting, (-) diarrhea, (-) blood in stool, (-) gastric ulcer. Psychiatric: (-) Depression, (-) Anxiety, (-) bipolar disease, (-) Alzheimer's Disease  Allergic/Immunologic: (-) allergies latex, (-) allergies metal, (-) skin sensitivity. Hematlogic: (-) anemia, (-) blood transfusion, (-) DVT/PE, (-) Clotting disorders      EXAM:      Constitution:  There were no vitals filed for this visit. Psycihatric:    The patient is alert and oriented x 3, appears to be stated age and in no distress. Respiratory:    Respiratory effort is not labored. Patient is not gasping. Palpation of the chest reveals no tactile fremitus. Skin:    Upon inspection: the skin appears warm, dry and intact. There is not a previous scar over the affected area. There is not any cellulitis, lymphedema or cutaneous lesions noted in the lower extremities. Upon palpation there is no induration noted. Neurologic:      Motor exam of the lower extremities show ; quadriceps, hamstrings, foot dorsi and plantar flexors intact R.  5/5 and L. 5/5. Deep tendon reflexes are 2/4 at the knees and 2/4 at the ankles with strong extensor hallicus longus motor strength bilaterally. Sensory to both feet is intact to all sensory roots. Cardiovascular: The vascular exam is normal and is well perfused to distal extremities. Distal pulses DP/PT: R. 2+; L. 2+. There is cap refill noted less than two seconds in all digits. There is not edema of the bilateral lower extremities. There is not varicosities noted in the distal extremities. Lymph:    Upon palpation,  there is no lymphadenopathy noted in bilateral lower extremities.       Musculoskeletal:    Gait: antalgic; examination of the nails and digits reveal no cyanosis or clubbing. Knee exam - bilateral knee exam shows;  range of motion of R. Knee is 0 to 140, and L. Knee is 0 to 140. The patient does not have  pain on motion, there is not an effusion, there is not tenderness over the  global region, there are not any masses, there is not ligamentous instability, there is not  deformity noted. Knee exam: the injured knee reveals normal exam, no swelling, tenderness, instability; ligaments intact, FROM. Ankle Exam:    Upon inspection and palpation of the Right ankle,  there is not deformity noted,  moderate swelling, moderate ecchymosis, has pain on palpation of left lateral  malleolus, right distal metatarsal region. ROM R: Limited Secondary to pain; Left ankle : DF20; PF 40;  INV 30, NICKOLAS 10. This exam was compared bilaterally. Right Ankle:   (-) Anterior Drawer ,  (-) Posterior Drawer ,  (-) Squeeze test,  (-) External Rotation, (-) Eversion test , (-) Madrigal Test     Left ankle:   (-) Anterior Drawer ,(-)  Posterior Drawer ,(-) Squeeze test,(-) External Rotation (-) Eversion test, (-) Madrigal Test.      Foot exam- visual inspection reveals warm, good capillary refill, there is not pain to palpation over the metatarsals. ROM inversion/eversion full range of motion, abduction/adduction full range of motion, ROM in MTP/PIP/DIP full range of motion. Xrays:    XR ANKLE LEFT (MIN 3 VIEWS)    Result Date: 10/11/2021  EXAMINATION: THREE XRAY VIEWS OF THE LEFT ANKLE; THREE XRAY VIEWS OF THE RIGHT FOOT 10/11/2021 7:44 pm COMPARISON: None. HISTORY: ORDERING SYSTEM PROVIDED HISTORY: fall TECHNOLOGIST PROVIDED HISTORY: Reason for exam:->fall FINDINGS: LEFT ANKLE: Transversely oriented nondisplaced fracture of the distal left fibula. There is marked lateral malleolar soft tissue swelling and a joint effusion. The imaged left tibia appears intact. Ankle mortise appears maintained. Talar dome appears intact.   Base of the 5th metatarsal appears intact. Osseous mineralization is normal. RIGHT FOOT: Radiographs of the right foot demonstrate no fractures with preserved alignment. No erosive changes. No significant degenerative spurring. Osseous mineralization is normal.  Mild dorsal soft tissue swelling over the mid to forefoot. 1.  Nondisplaced fracture of the distal left fibula. Marked lateral malleolar soft tissue swelling and small joint effusion. Ankle mortise appears maintained. 2.  No acute osseous findings seen about the right foot on this exam.  Normal alignment. There appears to be mild dorsal soft tissue swelling of the mid to forefoot. RECOMMENDATION: In the setting of trauma, if there is persistent symptoms on the right and physical exam warrants a repeat radiograph in 10-14 days could be considered as occult fractures may not be evident on initial imaging evaluation. XR FOOT RIGHT (MIN 3 VIEWS)    Result Date: 10/11/2021  EXAMINATION: THREE XRAY VIEWS OF THE LEFT ANKLE; THREE XRAY VIEWS OF THE RIGHT FOOT 10/11/2021 7:44 pm COMPARISON: None. HISTORY: ORDERING SYSTEM PROVIDED HISTORY: fall TECHNOLOGIST PROVIDED HISTORY: Reason for exam:->fall FINDINGS: LEFT ANKLE: Transversely oriented nondisplaced fracture of the distal left fibula. There is marked lateral malleolar soft tissue swelling and a joint effusion. The imaged left tibia appears intact. Ankle mortise appears maintained. Talar dome appears intact. Base of the 5th metatarsal appears intact. Osseous mineralization is normal. RIGHT FOOT: Radiographs of the right foot demonstrate no fractures with preserved alignment. No erosive changes. No significant degenerative spurring. Osseous mineralization is normal.  Mild dorsal soft tissue swelling over the mid to forefoot. 1.  Nondisplaced fracture of the distal left fibula. Marked lateral malleolar soft tissue swelling and small joint effusion. Ankle mortise appears maintained.  2.  No acute osseous findings seen about the right foot on this exam.  Normal alignment. There appears to be mild dorsal soft tissue swelling of the mid to forefoot. RECOMMENDATION: In the setting of trauma, if there is persistent symptoms on the right and physical exam warrants a repeat radiograph in 10-14 days could be considered as occult fractures may not be evident on initial imaging evaluation. CT FOOT RIGHT WO CONTRAST    Result Date: 10/20/2021  EXAMINATION: CT OF THE RIGHT FOOT WITHOUT CONTRAST 10/20/2021 3:04 pm TECHNIQUE: CT of the right foot was performed without the administration of intravenous contrast.  Multiplanar reformatted images are provided for review. Dose modulation, iterative reconstruction, and/or weight based adjustment of the mA/kV was utilized to reduce the radiation dose to as low as reasonably achievable. COMPARISON: None. HISTORY ORDERING SYSTEM PROVIDED HISTORY: Sprain of ligament of tarsometatarsal joint of right foot, initial encounter FINDINGS: There is an acute comminuted multi fragment displaced intra-articular fracture along the mid/lateral aspect of the 1st cuneiform bone. The fracture extends from the articular surface of the 1st cuneiform bone at the navicular-1st cuneiform joint through the articular surface of the 1st cuneiform bone with the articular base of the 1st metatarsal bone. The fracture also extends from the dorsal to the plantar aspect of the 1st cuneiform bone laterally. The fracture is in the area of the interosseous intercuneiform ligament in between the body of the 1st cuneiform and 2nd form bone and also in the area of the plantar stabilizer ligament between the base of the 2nd metatarsal bone and 1st cuneiform bone, and also in the dorsal ligament between the base of the 2nd metatarsal bone and 1st cuneiform bone. The fracture does not cover specifically the area of the plantar Lisfranc ligament which is related the with the base of the 2nd metatarsal bone.  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 No widening of the ankle mortise. Normal ankle soft tissues. Comminuted fracture at the medial cuneiform is redemonstrated. No clear fracture healing. Left ankle: Bony resorption along the fracture plane at the lateral malleolus is compatible with early healing. There is some persistent soft tissue swelling laterally and anteriorly. No widening of the ankle mortise. No new abnormal findings. Right ankle and foot. Stable comminuted fracture of the medial cuneiform. Osteochondritis dissecans at the medial talar dome. Left ankle: Early healing at lateral malleolar fracture. Soft tissue swelling. 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 No widening of the ankle mortise. Normal ankle soft tissues. Comminuted fracture at the medial cuneiform is redemonstrated. No clear fracture healing. Left ankle: Bony resorption along the fracture plane at the lateral malleolus is compatible with early healing. There is some persistent soft tissue swelling laterally and anteriorly. No widening of the ankle mortise. No new abnormal findings. Right ankle and foot. Stable comminuted fracture of the medial cuneiform. Osteochondritis dissecans at the medial talar dome. Left ankle: Early healing at lateral malleolar fracture. Soft tissue swelling.      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 No widening of the ankle mortise. Normal ankle soft tissues. Comminuted fracture at the medial cuneiform is redemonstrated. No clear fracture healing. Left ankle: Bony resorption along the fracture plane at the lateral malleolus is compatible with early healing. There is some persistent soft tissue swelling laterally and anteriorly. No widening of the ankle mortise. No new abnormal findings. Right ankle and foot. Stable comminuted fracture of the medial cuneiform. Osteochondritis dissecans at the medial talar dome. Left ankle: Early healing at lateral malleolar fracture. Soft tissue swelling. XR ANKLE LEFT (MIN 3 VIEWS)    Result Date: 12/9/2021  EXAMINATION: THREE XRAY VIEWS OF THE LEFT ANKLE 12/9/2021 3:12 pm COMPARISON: Left ankle series from November 4, 2021 HISTORY: ORDERING SYSTEM PROVIDED HISTORY: Closed fracture of left ankle, initial encounter FINDINGS: Again demonstrated is a transversely oriented fracture of the distal left fibula. The fracture lucency is less evident on this exam than prior exam suggesting some interval healing. Remainder of the study is unchanged. Overall alignment is anatomic. Persistent lateral malleolar soft tissue swelling. Healing fracture of the distal left fibula. No obvious complication.      XR ANKLE RIGHT (MIN 3 VIEWS)    Result Date: 12/9/2021  EXAMINATION: THREE XRAY VIEWS OF THE RIGHT ANKLE; THREE XRAY VIEWS OF THE RIGHT FOOT 12/9/2021 3:12 pm COMPARISON: Right ankle and right foot series from November 4, 2021 HISTORY: ORDERING SYSTEM PROVIDED HISTORY: Sprain of ligament of tarsometatarsal joint of right foot, initial encounter FINDINGS: Right ankle: Radiographs of the right ankle demonstrate no fractures with preserved alignment. Ankle mortise is maintained. Osteochondral lesion along the medial aspect of the right talar dome. Osseous mineralization is normal.  No soft tissue swelling or joint effusion. Right foot: Again demonstrated is a fracture of the medial cuneiform bone. Not significantly changed in position or appearance. There are no erosive changes. No degenerative spurring. Osseous mineralization is normal.  There is no soft tissue swelling. 1.  Unchanged position and appearance of medial cuneiform fracture of the right foot. 2.  Osteochondral lesion along the medial aspect of the right talar dome. 3.  Overall preserved alignment. XR FOOT RIGHT (MIN 3 VIEWS)    Result Date: 12/9/2021  EXAMINATION: THREE XRAY VIEWS OF THE RIGHT ANKLE; THREE XRAY VIEWS OF THE RIGHT FOOT 12/9/2021 3:12 pm COMPARISON: Right ankle and right foot series from November 4, 2021 HISTORY: ORDERING SYSTEM PROVIDED HISTORY: Sprain of ligament of tarsometatarsal joint of right foot, initial encounter FINDINGS: Right ankle: Radiographs of the right ankle demonstrate no fractures with preserved alignment. Ankle mortise is maintained. Osteochondral lesion along the medial aspect of the right talar dome. Osseous mineralization is normal.  No soft tissue swelling or joint effusion. Right foot: Again demonstrated is a fracture of the medial cuneiform bone. Not significantly changed in position or appearance. There are no erosive changes. No degenerative spurring. Osseous mineralization is normal.  There is no soft tissue swelling. 1.  Unchanged position and appearance of medial cuneiform fracture of the right foot. 2.  Osteochondral lesion along the medial aspect of the right talar dome.  3.  Overall preserved alignment. Radiographic findings reviewed with patient      Impression:  Anne Mesa was seen today for ankle pain. Diagnoses and all orders for this visit:    Closed fracture of left ankle, initial encounter    Sprain of ligament of tarsometatarsal joint of right foot, initial encounter          Patient seen examined. X-rays reviewed. Patient sustained injuries to both lower extremities. Patient does have a nondisplaced distal left malleolar fracture which is stable. However patient complains of pain along the Lisfranc region of the right foot concerning for injury here. Plan:Natural history and expected course discussed. Questions answered. Rest, ice, compression, elevation (RICE) therapy. Crutches and instructions provided. Educational materials distributed. Fit with long leg cast boot for right lower extremity for use over next 2-4 weeks. Home exercise plan outlined. OTC analgesics as needed. CT recommended of right foot for evaluation of Lisfranc injury. Patient will continue with cast boot and lace up ankle brace of the left ankle. 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 Patient is recommended to allow for we will cast boot while at home and nonweightbearing. In a 15 minute assessment and discussion, patient was refitted for a removable cast boot readjusted and applied. She was educated in detail how to use cast boot and the importance of use as well as range of motion and HEP exercises.

## 2022-01-05 DIAGNOSIS — S82.892A CLOSED FRACTURE OF LEFT ANKLE, INITIAL ENCOUNTER: Primary | ICD-10-CM

## 2022-01-05 DIAGNOSIS — S93.621A SPRAIN OF LIGAMENT OF TARSOMETATARSAL JOINT OF RIGHT FOOT, INITIAL ENCOUNTER: ICD-10-CM

## 2022-01-06 ENCOUNTER — OFFICE VISIT (OUTPATIENT)
Dept: ORTHOPEDIC SURGERY | Age: 22
End: 2022-01-06

## 2022-01-06 VITALS — BODY MASS INDEX: 39.27 KG/M2 | TEMPERATURE: 98 F | WEIGHT: 230 LBS | HEIGHT: 64 IN

## 2022-01-06 DIAGNOSIS — M20.22 HALLUX RIGIDUS OF LEFT FOOT: ICD-10-CM

## 2022-01-06 DIAGNOSIS — S82.892A CLOSED FRACTURE OF LEFT ANKLE, INITIAL ENCOUNTER: Primary | ICD-10-CM

## 2022-01-06 PROCEDURE — 99024 POSTOP FOLLOW-UP VISIT: CPT | Performed by: ORTHOPAEDIC SURGERY

## 2022-01-06 NOTE — PROGRESS NOTES
Never Used    Tobacco comment: quit with this pregnancy   Vaping Use    Vaping Use: Former   Substance and Sexual Activity    Alcohol use: Never    Drug use: Never    Sexual activity: Yes     Partners: Male   Other Topics Concern    Not on file   Social History Narrative    Not on file     Social Determinants of Health     Financial Resource Strain:     Difficulty of Paying Living Expenses: Not on file   Food Insecurity:     Worried About Running Out of Food in the Last Year: Not on file    Arron of Food in the Last Year: Not on file   Transportation Needs:     Lack of Transportation (Medical): Not on file    Lack of Transportation (Non-Medical): Not on file   Physical Activity:     Days of Exercise per Week: Not on file    Minutes of Exercise per Session: Not on file   Stress:     Feeling of Stress : Not on file   Social Connections:     Frequency of Communication with Friends and Family: Not on file    Frequency of Social Gatherings with Friends and Family: Not on file    Attends Congregation Services: Not on file    Active Member of [address] [place] [address] [employer] or Organizations: Not on file    Attends Club or Organization Meetings: Not on file    Marital Status: Not on file   Intimate Partner Violence:     Fear of Current or Ex-Partner: Not on file    Emotionally Abused: Not on file    Physically Abused: Not on file    Sexually Abused: Not on file   Housing Stability:     Unable to Pay for Housing in the Last Year: Not on file    Number of [place] in the Last Year: Not on file    Unstable Housing in the Last Year: Not on file     Family History   Problem Relation Age of Onset    Heart Disease Maternal Grandfather     Thyroid Disease Mother        REVIEW OF SYSTEMS:     General/Constitution:  (-)weight loss, (-)fever, (-)chills, (-)weakness. Skin: (-) rash,(-) psoriasis,(-) eczema, (-)skin cancer.    Musculoskeletal: (-) fractures,  (-) dislocations,(-) collagen vascular disease, (-) fibromyalgia, (-) multiple sclerosis, (-) muscular dystrophy, (-) RSD,(-) joint pain (-)swelling, (-) joint pain,swelling. Neurologic: (-) epilepsy, (-)seizures,(-) brain tumor,(-) TIA, (-)stroke, (-)headaches, (-)Parkinson disease,(-) memory loss, (-) LOC. Cardiovascular: (-) Chest pain, (-) swelling in legs/feet, (-) SOB, (-) cramping in legs/feet with walking. Respiratory: (-) SOB, (-) Coughing, (-) night sweats. GI: (-) nausea, (-) vomiting, (-) diarrhea, (-) blood in stool, (-) gastric ulcer. Psychiatric: (-) Depression, (-) Anxiety, (-) bipolar disease, (-) Alzheimer's Disease  Allergic/Immunologic: (-) allergies latex, (-) allergies metal, (-) skin sensitivity. Hematlogic: (-) anemia, (-) blood transfusion, (-) DVT/PE, (-) Clotting disorders      EXAM:      Constitution:  Vitals:    01/06/22 1510   Temp: 98 °F (36.7 °C)         Psycihatric:    The patient is alert and oriented x 3, appears to be stated age and in no distress. Respiratory:    Respiratory effort is not labored. Patient is not gasping. Palpation of the chest reveals no tactile fremitus. Skin:    Upon inspection: the skin appears warm, dry and intact. There is not a previous scar over the affected area. There is not any cellulitis, lymphedema or cutaneous lesions noted in the lower extremities. Upon palpation there is no induration noted. Neurologic:      Motor exam of the lower extremities show ; quadriceps, hamstrings, foot dorsi and plantar flexors intact R.  5/5 and L. 5/5. Deep tendon reflexes are 2/4 at the knees and 2/4 at the ankles with strong extensor hallicus longus motor strength bilaterally. Sensory to both feet is intact to all sensory roots. Cardiovascular: The vascular exam is normal and is well perfused to distal extremities. Distal pulses DP/PT: R. 2+; L. 2+. There is cap refill noted less than two seconds in all digits. There is not edema of the bilateral lower extremities.   There is not varicosities noted in the distal extremities. Lymph:    Upon palpation,  there is no lymphadenopathy noted in bilateral lower extremities. Musculoskeletal:    Gait: antalgic; examination of the nails and digits reveal no cyanosis or clubbing. Knee exam - bilateral knee exam shows;  range of motion of R. Knee is 0 to 140, and L. Knee is 0 to 140. The patient does not have  pain on motion, there is not an effusion, there is not tenderness over the  global region, there are not any masses, there is not ligamentous instability, there is not  deformity noted. Knee exam: the injured knee reveals normal exam, no swelling, tenderness, instability; ligaments intact, FROM. Ankle Exam:    Upon inspection and palpation of the Right ankle,  there is not deformity noted,  moderate swelling, moderate ecchymosis, has pain on palpation of left lateral  malleolus, right distal metatarsal region. ROM R: Limited Secondary to pain; Left ankle : DF20; PF 40;  INV 30, NICKOLAS 10. This exam was compared bilaterally. Right Ankle:   (-) Anterior Drawer ,  (-) Posterior Drawer ,  (-) Squeeze test,  (-) External Rotation, (-) Eversion test , (-) Madrigal Test     Left ankle:   (-) Anterior Drawer ,(-)  Posterior Drawer ,(-) Squeeze test,(-) External Rotation (-) Eversion test, (-) Madrigal Test.      Foot exam- visual inspection reveals warm, good capillary refill, there is not pain to palpation over the metatarsals. ROM inversion/eversion full range of motion, abduction/adduction full range of motion, ROM in MTP/PIP/DIP full range of motion. Xrays:    XR ANKLE LEFT (MIN 3 VIEWS)    Result Date: 10/11/2021  EXAMINATION: THREE XRAY VIEWS OF THE LEFT ANKLE; THREE XRAY VIEWS OF THE RIGHT FOOT 10/11/2021 7:44 pm COMPARISON: None. HISTORY: ORDERING SYSTEM PROVIDED HISTORY: fall TECHNOLOGIST PROVIDED HISTORY: Reason for exam:->fall FINDINGS: LEFT ANKLE: Transversely oriented nondisplaced fracture of the distal left fibula.   There is marked lateral malleolar soft tissue swelling and a joint effusion. The imaged left tibia appears intact. Ankle mortise appears maintained. Talar dome appears intact. Base of the 5th metatarsal appears intact. Osseous mineralization is normal. RIGHT FOOT: Radiographs of the right foot demonstrate no fractures with preserved alignment. No erosive changes. No significant degenerative spurring. Osseous mineralization is normal.  Mild dorsal soft tissue swelling over the mid to forefoot. 1.  Nondisplaced fracture of the distal left fibula. Marked lateral malleolar soft tissue swelling and small joint effusion. Ankle mortise appears maintained. 2.  No acute osseous findings seen about the right foot on this exam.  Normal alignment. There appears to be mild dorsal soft tissue swelling of the mid to forefoot. RECOMMENDATION: In the setting of trauma, if there is persistent symptoms on the right and physical exam warrants a repeat radiograph in 10-14 days could be considered as occult fractures may not be evident on initial imaging evaluation. XR FOOT RIGHT (MIN 3 VIEWS)    Result Date: 10/11/2021  EXAMINATION: THREE XRAY VIEWS OF THE LEFT ANKLE; THREE XRAY VIEWS OF THE RIGHT FOOT 10/11/2021 7:44 pm COMPARISON: None. HISTORY: ORDERING SYSTEM PROVIDED HISTORY: fall TECHNOLOGIST PROVIDED HISTORY: Reason for exam:->fall FINDINGS: LEFT ANKLE: Transversely oriented nondisplaced fracture of the distal left fibula. There is marked lateral malleolar soft tissue swelling and a joint effusion. The imaged left tibia appears intact. Ankle mortise appears maintained. Talar dome appears intact. Base of the 5th metatarsal appears intact. Osseous mineralization is normal. RIGHT FOOT: Radiographs of the right foot demonstrate no fractures with preserved alignment. No erosive changes. No significant degenerative spurring. Osseous mineralization is normal.  Mild dorsal soft tissue swelling over the mid to forefoot. 1.  Nondisplaced fracture of the distal left fibula. Marked lateral malleolar soft tissue swelling and small joint effusion. Ankle mortise appears maintained. 2.  No acute osseous findings seen about the right foot on this exam.  Normal alignment. There appears to be mild dorsal soft tissue swelling of the mid to forefoot. RECOMMENDATION: In the setting of trauma, if there is persistent symptoms on the right and physical exam warrants a repeat radiograph in 10-14 days could be considered as occult fractures may not be evident on initial imaging evaluation. CT FOOT RIGHT WO CONTRAST    Result Date: 10/20/2021  EXAMINATION: CT OF THE RIGHT FOOT WITHOUT CONTRAST 10/20/2021 3:04 pm TECHNIQUE: CT of the right foot was performed without the administration of intravenous contrast.  Multiplanar reformatted images are provided for review. Dose modulation, iterative reconstruction, and/or weight based adjustment of the mA/kV was utilized to reduce the radiation dose to as low as reasonably achievable. COMPARISON: None. HISTORY ORDERING SYSTEM PROVIDED HISTORY: Sprain of ligament of tarsometatarsal joint of right foot, initial encounter FINDINGS: There is an acute comminuted multi fragment displaced intra-articular fracture along the mid/lateral aspect of the 1st cuneiform bone. The fracture extends from the articular surface of the 1st cuneiform bone at the navicular-1st cuneiform joint through the articular surface of the 1st cuneiform bone with the articular base of the 1st metatarsal bone. The fracture also extends from the dorsal to the plantar aspect of the 1st cuneiform bone laterally.  The fracture is in the area of the interosseous intercuneiform ligament in between the body of the 1st cuneiform and 2nd form bone and also in the area of the plantar stabilizer ligament between the base of the 2nd metatarsal bone and 1st cuneiform bone, and also in the dorsal ligament between the base of the 2nd metatarsal bone and 1st cuneiform bone. The fracture does not cover specifically the area of the plantar Lisfranc ligament which is related the with the base of the 2nd metatarsal bone. No acute fractures are seen in the 2nd and 3rd cuneiform bones or in the cuboid bone. The there is no fractures in the navicular bone or in the tarsal bone or in the calcaneus. All joint spaces of the foot are intact except by the proximal and distal articular surfaces of the 1st cuneiform bone particular the distal articular surface where multiple small displaced fragments are seen causing asymmetry of the joint space. Soft tissue swelling is present as expected the in the region where the fracture is located, particular in the more deep dorsal and plantar aspect. As described previously in the CT scan of the right ankle of October 14 there is a old osteochondral insult in the medial articular surface of the talus covering an area of a 15 mm in the anterior to posterior diameter by 8.5 mm in the transverse diameter by 8 mm in depth, with a thick sclerotic base and more superficial multiple radha avulsed nonunited detached bone fragments. Some degree of soft tissue swelling and the apparently thickening is seen in the distal posterior tibial tendon proximal to the attachment in the navicular bone which can represent a form of sprain of the tendon. The other tendons appear unremarkable. No soft tissue swelling is seen in the area of the anterior and posterior title fibular and tibiofibular ligaments. The Achilles tendon appear unremarkable. Comminuted multi fragment intra-articular displaced fracture of the mid lateral aspect of the 1st cuneiform bone as above commented.      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 No widening of the ankle mortise. Normal ankle soft tissues. Comminuted fracture at the medial cuneiform is redemonstrated. No clear fracture healing. Left ankle: Bony resorption along the fracture plane at the lateral malleolus is compatible with early healing. There is some persistent soft tissue swelling laterally and anteriorly. No widening of the ankle mortise. No new abnormal findings. Right ankle and foot. Stable comminuted fracture of the medial cuneiform. Osteochondritis dissecans at the medial talar dome. Left ankle: Early healing at lateral malleolar fracture. Soft tissue swelling. 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 No widening of the ankle mortise. Normal ankle soft tissues. Comminuted fracture at the medial cuneiform is redemonstrated. No clear fracture healing. Left ankle: Bony resorption along the fracture plane at the lateral malleolus is compatible with early healing. There is some persistent soft tissue swelling laterally and anteriorly. No widening of the ankle mortise. No new abnormal findings. Right ankle and foot. Stable comminuted fracture of the medial cuneiform.  Osteochondritis dissecans at the medial talar dome. Left ankle: Early healing at lateral malleolar fracture. Soft tissue swelling. 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 No widening of the ankle mortise. Normal ankle soft tissues. Comminuted fracture at the medial cuneiform is redemonstrated. No clear fracture healing. Left ankle: Bony resorption along the fracture plane at the lateral malleolus is compatible with early healing. There is some persistent soft tissue swelling laterally and anteriorly. No widening of the ankle mortise. No new abnormal findings. Right ankle and foot. Stable comminuted fracture of the medial cuneiform. Osteochondritis dissecans at the medial talar dome. Left ankle: Early healing at lateral malleolar fracture. Soft tissue swelling. XR ANKLE LEFT (MIN 3 VIEWS)    Result Date: 12/9/2021  EXAMINATION: THREE XRAY VIEWS OF THE LEFT ANKLE 12/9/2021 3:12 pm COMPARISON: Left ankle series from November 4, 2021 HISTORY: ORDERING SYSTEM PROVIDED HISTORY: Closed fracture of left ankle, initial encounter FINDINGS: Again demonstrated is a transversely oriented fracture of the distal left fibula. The fracture lucency is less evident on this exam than prior exam suggesting some interval healing. Remainder of the study is unchanged. Overall alignment is anatomic. Persistent lateral malleolar soft tissue swelling. Healing fracture of the distal left fibula. No obvious complication.      XR ANKLE RIGHT (MIN 3 VIEWS)    Result Date: 12/9/2021  EXAMINATION: THREE XRAY VIEWS OF THE RIGHT ANKLE; THREE XRAY VIEWS OF THE RIGHT FOOT 12/9/2021 3:12 pm COMPARISON: Right ankle and right foot series from November 4, 2021 HISTORY: ORDERING SYSTEM PROVIDED HISTORY: Sprain of ligament of tarsometatarsal joint of right foot, initial encounter FINDINGS: Right ankle: Radiographs of the right ankle demonstrate no fractures with preserved alignment. Ankle mortise is maintained. Osteochondral lesion along the medial aspect of the right talar dome. Osseous mineralization is normal.  No soft tissue swelling or joint effusion. Right foot: Again demonstrated is a fracture of the medial cuneiform bone. Not significantly changed in position or appearance. There are no erosive changes. No degenerative spurring. Osseous mineralization is normal.  There is no soft tissue swelling. 1.  Unchanged position and appearance of medial cuneiform fracture of the right foot. 2.  Osteochondral lesion along the medial aspect of the right talar dome. 3.  Overall preserved alignment. XR FOOT RIGHT (MIN 3 VIEWS)    Result Date: 12/9/2021  EXAMINATION: THREE XRAY VIEWS OF THE RIGHT ANKLE; THREE XRAY VIEWS OF THE RIGHT FOOT 12/9/2021 3:12 pm COMPARISON: Right ankle and right foot series from November 4, 2021 HISTORY: ORDERING SYSTEM PROVIDED HISTORY: Sprain of ligament of tarsometatarsal joint of right foot, initial encounter FINDINGS: Right ankle: Radiographs of the right ankle demonstrate no fractures with preserved alignment. Ankle mortise is maintained. Osteochondral lesion along the medial aspect of the right talar dome. Osseous mineralization is normal.  No soft tissue swelling or joint effusion. Right foot: Again demonstrated is a fracture of the medial cuneiform bone. Not significantly changed in position or appearance. There are no erosive changes. No degenerative spurring.   Osseous mineralization is normal.  There is no soft tissue swelling. 1.  Unchanged position and appearance of medial cuneiform fracture of the right foot. 2.  Osteochondral lesion along the medial aspect of the right talar dome. 3.  Overall preserved alignment. XR ANKLE LEFT (MIN 3 VIEWS)    Result Date: 1/6/2022  EXAMINATION: THREE XRAY VIEWS OF THE RIGHT ANKLE; THREE XRAY VIEWS OF THE LEFT ANKLE; THREE XRAY VIEWS OF THE RIGHT FOOT 1/6/2022 2:52 pm COMPARISON: Right foot and ankle series and left ankle series from December 9, 2021 HISTORY: ORDERING SYSTEM PROVIDED HISTORY: Closed fracture of left ankle, initial encounter TECHNOLOGIST PROVIDED HISTORY: Reason for exam:->pain FINDINGS: Left ankle: Interval progression of healing of the distal left fibula fracture. No change in position or overall appearance. Interval decrease in soft tissue swelling. Ankle mortise is maintained. Normal appearance of the talar dome and the base of the 5th metatarsal.  Osseous mineralization is normal. Right ankle: Radiographs of the right ankle demonstrate no fractures with preserved alignment. Ankle mortise is maintained. Osteochondral lesion identified in the superomedial aspect of the talar dome. Osseous mineralization is normal.  No soft tissue swelling or joint effusion. Right foot: No significant change in appearance or position of the previously described right midfoot medial cuneiform fracture. There are no erosive changes. No degenerative spurring. Osseous mineralization is normal.  There is no soft tissue swelling. 1.  Healing fracture of the distal left fibula. Interval decrease in soft tissue swelling. No new or complicating features. 2.  No change in position or appearance of right foot medial cuneiform fracture when compared to prior exam.  No new or complicating features. 3.  Stable osteochondral lesion in the medial talar dome of the right ankle. Otherwise unremarkable right ankle series.      XR ANKLE RIGHT (MIN 3 VIEWS)    Result Date: 1/6/2022  EXAMINATION: THREE XRAY VIEWS OF THE RIGHT ANKLE; THREE XRAY VIEWS OF THE LEFT ANKLE; THREE XRAY VIEWS OF THE RIGHT FOOT 1/6/2022 2:52 pm COMPARISON: Right foot and ankle series and left ankle series from December 9, 2021 HISTORY: ORDERING SYSTEM PROVIDED HISTORY: Closed fracture of left ankle, initial encounter TECHNOLOGIST PROVIDED HISTORY: Reason for exam:->pain FINDINGS: Left ankle: Interval progression of healing of the distal left fibula fracture. No change in position or overall appearance. Interval decrease in soft tissue swelling. Ankle mortise is maintained. Normal appearance of the talar dome and the base of the 5th metatarsal.  Osseous mineralization is normal. Right ankle: Radiographs of the right ankle demonstrate no fractures with preserved alignment. Ankle mortise is maintained. Osteochondral lesion identified in the superomedial aspect of the talar dome. Osseous mineralization is normal.  No soft tissue swelling or joint effusion. Right foot: No significant change in appearance or position of the previously described right midfoot medial cuneiform fracture. There are no erosive changes. No degenerative spurring. Osseous mineralization is normal.  There is no soft tissue swelling. 1.  Healing fracture of the distal left fibula. Interval decrease in soft tissue swelling. No new or complicating features. 2.  No change in position or appearance of right foot medial cuneiform fracture when compared to prior exam.  No new or complicating features. 3.  Stable osteochondral lesion in the medial talar dome of the right ankle. Otherwise unremarkable right ankle series.      XR FOOT RIGHT (MIN 3 VIEWS)    Result Date: 1/6/2022  EXAMINATION: THREE XRAY VIEWS OF THE RIGHT ANKLE; THREE XRAY VIEWS OF THE LEFT ANKLE; THREE XRAY VIEWS OF THE RIGHT FOOT 1/6/2022 2:52 pm COMPARISON: Right foot and ankle series and left ankle series from December 9, 2021 HISTORY: [address] [place] [address] HISTORY: Closed fracture of left ankle, initial encounter TECHNOLOGIST PROVIDED HISTORY: Reason for exam:->pain FINDINGS: Left ankle: Interval progression of healing of the distal left fibula fracture. No change in position or overall appearance. Interval decrease in soft tissue swelling. Ankle mortise is maintained. Normal appearance of the talar dome and the base of the 5th metatarsal.  Osseous mineralization is normal. Right ankle: Radiographs of the right ankle demonstrate no fractures with preserved alignment. Ankle mortise is maintained. Osteochondral lesion identified in the superomedial aspect of the talar dome. Osseous mineralization is normal.  No soft tissue swelling or joint effusion. Right foot: No significant change in appearance or position of the previously described right midfoot medial cuneiform fracture. There are no erosive changes. No degenerative spurring. Osseous mineralization is normal.  There is no soft tissue swelling. 1.  Healing fracture of the distal left fibula. Interval decrease in soft tissue swelling. No new or complicating features. 2.  No change in position or appearance of right foot medial cuneiform fracture when compared to prior exam.  No new or complicating features. 3.  Stable osteochondral lesion in the medial talar dome of the right ankle. Otherwise unremarkable right ankle series. Radiographic findings reviewed with patient      Impression:  Lico Hermosillo was seen today for foot pain, ankle pain and ankle injury. Diagnoses and all orders for this visit:    Closed fracture of left ankle, initial encounter  -     XR ANKLE LEFT (MIN 3 VIEWS); Future    Hallux rigidus of left foot  -     [place], [place], Podiatry, [place]          Patient seen examined. X-rays reviewed. Patient sustained injuries to both lower extremities. Patient does have a nondisplaced distal left malleolar fracture which is stable.   However patient complains of pain along the Lisfranc region of the right foot concerning for injury here. Plan:Natural history and expected course discussed. Questions answered. Rest, ice, compression, elevation (RICE) therapy. Crutches and instructions provided. Educational materials distributed. Fit with long leg cast boot for right lower extremity for use over next 2-4 weeks. Home exercise plan outlined. OTC analgesics as needed. CT recommended of right foot for evaluation of Lisfranc injury. Patient will continue with cast boot and lace up ankle brace of the left ankle. 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 Patient is recommended to allow for we will cast boot while at home and weightbearing.

## 2022-01-13 ENCOUNTER — OFFICE VISIT (OUTPATIENT)
Dept: PODIATRY | Age: 22
End: 2022-01-13
Payer: COMMERCIAL

## 2022-01-13 VITALS — HEIGHT: 64 IN | WEIGHT: 230 LBS | BODY MASS INDEX: 39.27 KG/M2

## 2022-01-13 DIAGNOSIS — S92.241G: Primary | ICD-10-CM

## 2022-01-13 DIAGNOSIS — R26.2 DIFFICULTY WALKING: ICD-10-CM

## 2022-01-13 DIAGNOSIS — S93.621A LISFRANC'S SPRAIN, RIGHT, INITIAL ENCOUNTER: ICD-10-CM

## 2022-01-13 DIAGNOSIS — M79.671 PAIN OF RIGHT FOOT: ICD-10-CM

## 2022-01-13 PROCEDURE — G8427 DOCREV CUR MEDS BY ELIG CLIN: HCPCS | Performed by: PODIATRIST

## 2022-01-13 PROCEDURE — 99243 OFF/OP CNSLTJ NEW/EST LOW 30: CPT | Performed by: PODIATRIST

## 2022-01-13 PROCEDURE — G8417 CALC BMI ABV UP PARAM F/U: HCPCS | Performed by: PODIATRIST

## 2022-01-13 PROCEDURE — G8484 FLU IMMUNIZE NO ADMIN: HCPCS | Performed by: PODIATRIST

## 2022-01-13 NOTE — PROGRESS NOTES
Patient is here for right foot pain. Patient states she missed a step and went to catch herself and broke her right foot and left ankle. Patient states it happened in October.  Electronically signed by Kishan Payne LPN on 4/16/1647 at 6:96 PM

## 2022-01-13 NOTE — LETTER
[id number] [place] [address]  Phone: [phone]  Fax: [address] [place], [place]        January 13, 2022     Patient: Gerhardt Cart   YOB: 2000   Date of Visit: 1/13/2022       To Whom It May Concern: It is my medical opinion that Paige Fairchild should remain out of work until further notice due to right foot diagnosis. Patient is scheduled for surgery. If you have any questions or concerns, please don't hesitate to call.     Sincerely,        Jessee Choi DPM

## 2022-01-13 NOTE — LETTER
[address] [place] [address]  Phone: [phone]  Fax: [phone]    Shiraz Aguilar  [id number]   2000 1/13/2022      Dear Jmuana Blanco,    I would like to thank you for the kind referral of Reuglo Aguilar. She presented to the office today for evaluation regarding recalcitrant pain regarding right foot fracture. Did review radiographs and CT scan with patient today. Did discuss potential surgical intervention for repair comminuted medial cuneiform fracture and augmentation Lisfranc's ligament repair right foot. Will be performed in the near future once patient obtains medical clearance and proper laboratory studies regarding her thrombocytopenia issues. We will have continued follow-up until issues are resolved. If you should have any questions concerning her visit today, please do not hesitate to contact me.     Sincerely,    Stefani Roberto DPM

## 2022-01-14 NOTE — PROGRESS NOTES
22     [facility]    : 2000 Sex: female   Age: 24 y.o. Patient was referred by: Magalis Patton DO  Patient's PCP/Provider is: No primary care provider on file. Subjective:    Seen today for evaluation regarding right foot pain. Chief Complaint   Patient presents with    Foot Pain     right ankle        HPI: Patient has had an issue in her right foot and ankle since October. Patient had a fracture of the distal fibula and medial cuneiform right foot. Was treated with conservative care options with Cam walker and immobilization. She has had multiple radiographs and CT scan performed. Patient has been having continued pain and swelling into her right foot with every day activities. Presents today to discuss other potential treatment options available. ROS:  Const: Positives and pertinent negatives as per HPI. Musculo: Denies symptoms other than stated above. Neuro: Denies symptoms other than stated above. Skin: Denies symptoms other than stated above. Current Medications:  No current outpatient medications on file. Allergies:   Allergies   Allergen Reactions    Aspirin      Has ITP       Vitals:    22 1527   Weight: 230 lb (104.3 kg)   Height: 5' 4\" (1.626 m)        Past Medical History:   Diagnosis Date    Autoimmune disorder ([facility] [address]     ITP     delivery delivered 7/15/2020    Closed displaced fracture of medial cuneiform of right foot with delayed healing 2022    Disease of blood and blood forming organ     blood clotting issue    Hypertension      Family History   Problem Relation Age of Onset    Heart Disease Maternal Grandfather     Thyroid Disease Mother      Past Surgical History:   Procedure Laterality Date    ARM SURGERY Left      SECTION       SECTION N/A 7/15/2020    REPEAT  SECTION performed by Sterling Valverde MD at [facility] L[facility]D OR     Social History     Tobacco Use    Smoking status: Current Some Day Smoker Last attempt to quit: 2019     Years since quittin.2    Smokeless tobacco: Never Used    Tobacco comment: quit with this pregnancy   Vaping Use    Vaping Use: Former   Substance Use Topics    Alcohol use: Never    Drug use: Never           Diagnostic studies:    XR ANKLE LEFT (MIN 3 VIEWS)    Result Date: 2022  EXAMINATION: THREE XRAY VIEWS OF THE RIGHT ANKLE; THREE XRAY VIEWS OF THE LEFT ANKLE; THREE XRAY VIEWS OF THE RIGHT FOOT 2022 2:52 pm COMPARISON: Right foot and ankle series and left ankle series from 2021 HISTORY: ORDERING SYSTEM PROVIDED HISTORY: Closed fracture of left ankle, initial encounter TECHNOLOGIST PROVIDED HISTORY: Reason for exam:->pain FINDINGS: Left ankle: Interval progression of healing of the distal left fibula fracture. No change in position or overall appearance. Interval decrease in soft tissue swelling. Ankle mortise is maintained. Normal appearance of the talar dome and the base of the 5th metatarsal.  Osseous mineralization is normal. Right ankle: Radiographs of the right ankle demonstrate no fractures with preserved alignment. Ankle mortise is maintained. Osteochondral lesion identified in the superomedial aspect of the talar dome. Osseous mineralization is normal.  No soft tissue swelling or joint effusion. Right foot: No significant change in appearance or position of the previously described right midfoot medial cuneiform fracture. There are no erosive changes. No degenerative spurring. Osseous mineralization is normal.  There is no soft tissue swelling. 1.  Healing fracture of the distal left fibula. Interval decrease in soft tissue swelling. No new or complicating features. 2.  No change in position or appearance of right foot medial cuneiform fracture when compared to prior exam.  No new or complicating features. 3.  Stable osteochondral lesion in the medial talar dome of the right ankle. Otherwise unremarkable right ankle series. XR ANKLE RIGHT (MIN 3 VIEWS)    Result Date: 1/6/2022  EXAMINATION: THREE XRAY VIEWS OF THE RIGHT ANKLE; THREE XRAY VIEWS OF THE LEFT ANKLE; THREE XRAY VIEWS OF THE RIGHT FOOT 1/6/2022 2:52 pm COMPARISON: Right foot and ankle series and left ankle series from December 9, 2021 HISTORY: ORDERING SYSTEM PROVIDED HISTORY: Closed fracture of left ankle, initial encounter TECHNOLOGIST PROVIDED HISTORY: Reason for exam:->pain FINDINGS: Left ankle: Interval progression of healing of the distal left fibula fracture. No change in position or overall appearance. Interval decrease in soft tissue swelling. Ankle mortise is maintained. Normal appearance of the talar dome and the base of the 5th metatarsal.  Osseous mineralization is normal. Right ankle: Radiographs of the right ankle demonstrate no fractures with preserved alignment. Ankle mortise is maintained. Osteochondral lesion identified in the superomedial aspect of the talar dome. Osseous mineralization is normal.  No soft tissue swelling or joint effusion. Right foot: No significant change in appearance or position of the previously described right midfoot medial cuneiform fracture. There are no erosive changes. No degenerative spurring. Osseous mineralization is normal.  There is no soft tissue swelling. 1.  Healing fracture of the distal left fibula. Interval decrease in soft tissue swelling. No new or complicating features. 2.  No change in position or appearance of right foot medial cuneiform fracture when compared to prior exam.  No new or complicating features. 3.  Stable osteochondral lesion in the medial talar dome of the right ankle. Otherwise unremarkable right ankle series.      XR FOOT RIGHT (MIN 3 VIEWS)    Result Date: 1/6/2022  EXAMINATION: THREE XRAY VIEWS OF THE RIGHT ANKLE; THREE XRAY VIEWS OF THE LEFT ANKLE; THREE XRAY VIEWS OF THE RIGHT FOOT 1/6/2022 2:52 pm COMPARISON: Right foot and ankle series and left ankle series from December 9, 2021 HISTORY: ORDERING SYSTEM PROVIDED HISTORY: Closed fracture of left ankle, initial encounter TECHNOLOGIST PROVIDED HISTORY: Reason for exam:->pain FINDINGS: Left ankle: Interval progression of healing of the distal left fibula fracture. No change in position or overall appearance. Interval decrease in soft tissue swelling. Ankle mortise is maintained. Normal appearance of the talar dome and the base of the 5th metatarsal.  Osseous mineralization is normal. Right ankle: Radiographs of the right ankle demonstrate no fractures with preserved alignment. Ankle mortise is maintained. Osteochondral lesion identified in the superomedial aspect of the talar dome. Osseous mineralization is normal.  No soft tissue swelling or joint effusion. Right foot: No significant change in appearance or position of the previously described right midfoot medial cuneiform fracture. There are no erosive changes. No degenerative spurring. Osseous mineralization is normal.  There is no soft tissue swelling. 1.  Healing fracture of the distal left fibula. Interval decrease in soft tissue swelling. No new or complicating features. 2.  No change in position or appearance of right foot medial cuneiform fracture when compared to prior exam.  No new or complicating features. 3.  Stable osteochondral lesion in the medial talar dome of the right ankle. Otherwise unremarkable right ankle series. Procedures:    None    Exam:  VASCULAR: Pedal pulses palpable right foot. Capillary refill time brisk digits 1 through 5 right foot. Hair growth present right foot. NEUROLOGICAL: Epicritic sensations intact right foot. DERMATOLOGICAL: Mild edema noted without ecchymosis right medial midfoot region. No skin abrasions or any signs of infection noted right foot. MUSCULOSKELETAL: Tenderness noted to palpation overlying the medial cuneiform region and into the first met/second metatarsal cuneiform articulation.   \"Range of motion ankle, subtalar joint, and MTPJ regions noted. Plan Per Assessment  Marianne Feliciano was seen today for foot pain. Diagnoses and all orders for this visit:    Closed displaced fracture of medial cuneiform of right foot with delayed healing    Lisfranc's sprain, right, initial encounter    Pain of right foot    Difficulty walking        1. New patient consultation and management  2. Did review x-ray studies with patient in detail today. We did discuss additional CT results as well. 3. We did discuss potential outpatient surgical intervention for repair of the comminuted cuneiform fracture and augmentation of Lisfranc's ligament. It was in favor of this course of treatment. 4. Patient will have to have a primary care physician evaluation, clearance and lab work performed prior to proceeding with outpatient surgical intervention. In the interim she was to continue wearing her good supportive shoe gear and insoles. She was advised to call the office with any questions or concerns in the interim. Seen By:    Warden Hank DPM    Electronically signed by Warden Hank DPM on 1/13/2022 at 7:20 PM      This note was created using voice recognition software. The note was reviewed however may contain grammatical errors.

## 2022-02-08 ENCOUNTER — OFFICE VISIT (OUTPATIENT)
Dept: FAMILY MEDICINE CLINIC | Age: 22
End: 2022-02-08
Payer: COMMERCIAL

## 2022-02-08 VITALS
WEIGHT: 264 LBS | TEMPERATURE: 97 F | SYSTOLIC BLOOD PRESSURE: 126 MMHG | DIASTOLIC BLOOD PRESSURE: 86 MMHG | HEART RATE: 108 BPM | BODY MASS INDEX: 45.32 KG/M2 | OXYGEN SATURATION: 97 %

## 2022-02-08 DIAGNOSIS — Z76.89 ENCOUNTER TO ESTABLISH CARE: Primary | ICD-10-CM

## 2022-02-08 DIAGNOSIS — E66.01 MORBID OBESITY WITH BMI OF 45.0-49.9, ADULT (HCC): ICD-10-CM

## 2022-02-08 DIAGNOSIS — F17.210 CIGARETTE NICOTINE DEPENDENCE WITHOUT COMPLICATION: ICD-10-CM

## 2022-02-08 PROBLEM — O13.3 GESTATIONAL HYPERTENSION WITHOUT SIGNIFICANT PROTEINURIA IN THIRD TRIMESTER: Status: RESOLVED | Noted: 2020-06-27 | Resolved: 2022-02-08

## 2022-02-08 PROBLEM — D69.59 ALLOIMMUNE THROMBOCYTOPENIA OF MOTHER DURING PREGNANCY: Status: RESOLVED | Noted: 2020-03-11 | Resolved: 2022-02-08

## 2022-02-08 PROBLEM — O34.219 HISTORY OF CESAREAN DELIVERY AFFECTING PREGNANCY: Status: RESOLVED | Noted: 2020-04-01 | Resolved: 2022-02-08

## 2022-02-08 PROBLEM — Z3A.39 39 WEEKS GESTATION OF PREGNANCY: Status: RESOLVED | Noted: 2020-04-01 | Resolved: 2022-02-08

## 2022-02-08 PROBLEM — M79.671 PAIN OF RIGHT FOOT: Status: RESOLVED | Noted: 2022-01-13 | Resolved: 2022-02-08

## 2022-02-08 PROBLEM — Z34.90 TERM PREGNANCY: Status: RESOLVED | Noted: 2020-07-15 | Resolved: 2022-02-08

## 2022-02-08 PROBLEM — O99.119 ALLOIMMUNE THROMBOCYTOPENIA OF MOTHER DURING PREGNANCY: Status: RESOLVED | Noted: 2020-03-11 | Resolved: 2022-02-08

## 2022-02-08 PROCEDURE — G8417 CALC BMI ABV UP PARAM F/U: HCPCS | Performed by: FAMILY MEDICINE

## 2022-02-08 PROCEDURE — 4004F PT TOBACCO SCREEN RCVD TLK: CPT | Performed by: FAMILY MEDICINE

## 2022-02-08 PROCEDURE — G8427 DOCREV CUR MEDS BY ELIG CLIN: HCPCS | Performed by: FAMILY MEDICINE

## 2022-02-08 PROCEDURE — G8484 FLU IMMUNIZE NO ADMIN: HCPCS | Performed by: FAMILY MEDICINE

## 2022-02-08 PROCEDURE — 99203 OFFICE O/P NEW LOW 30 MIN: CPT | Performed by: FAMILY MEDICINE

## 2022-02-08 SDOH — ECONOMIC STABILITY: FOOD INSECURITY: WITHIN THE PAST 12 MONTHS, THE FOOD YOU BOUGHT JUST DIDN'T LAST AND YOU DIDN'T HAVE MONEY TO GET MORE.: NEVER TRUE

## 2022-02-08 SDOH — ECONOMIC STABILITY: FOOD INSECURITY: WITHIN THE PAST 12 MONTHS, YOU WORRIED THAT YOUR FOOD WOULD RUN OUT BEFORE YOU GOT MONEY TO BUY MORE.: NEVER TRUE

## 2022-02-08 ASSESSMENT — PATIENT HEALTH QUESTIONNAIRE - PHQ9
SUM OF ALL RESPONSES TO PHQ QUESTIONS 1-9: 0
2. FEELING DOWN, DEPRESSED OR HOPELESS: 0
SUM OF ALL RESPONSES TO PHQ9 QUESTIONS 1 & 2: 0
1. LITTLE INTEREST OR PLEASURE IN DOING THINGS: 0
SUM OF ALL RESPONSES TO PHQ QUESTIONS 1-9: 0

## 2022-02-08 ASSESSMENT — SOCIAL DETERMINANTS OF HEALTH (SDOH): HOW HARD IS IT FOR YOU TO PAY FOR THE VERY BASICS LIKE FOOD, HOUSING, MEDICAL CARE, AND HEATING?: NOT HARD AT ALL

## 2022-02-08 NOTE — PATIENT INSTRUCTIONS
LOW CARBOHYDRATE FOR  WEIGHT CONTROL. LOW FAT DIET FOR CHOLESTEROL CONTROL. REGULAR WALKING ADVISED. ADVISED WEIGHT REDUCTION. ADVISED TO QUIT SMOKING. FASTING FOR LAB WORK ONE MORNING. NEXT APPOINTMENT IN 3 3 WEEKS FOR PRE OP EVALUATION.

## 2022-02-08 NOTE — PROGRESS NOTES
Not on file    Attends Shinto Services: Not on file    Active Member of Clubs or Organizations: Not on file    Attends Club or Organization Meetings: Not on file    Marital Status: Not on file   Intimate Partner Violence:     Fear of Current or Ex-Partner: Not on file    Emotionally Abused: Not on file    Physically Abused: Not on file    Sexually Abused: Not on file   Housing Stability:     Unable to Pay for Housing in the Last Year: Not on file    Number of [place] in the Last Year: Not on file    Unstable Housing in the Last Year: Not on file       I have reviewed Alysa's allergies, medications, problem list, medical, social and family history and have updated as needed in the electronic medical record  Review Of Systems:    Skin: no abnormal pigmentation, rash, scaling, itching, masses, hair or nail changes  Eyes: no blurring, diplopia, or eye pain  Ears/Nose/Throat: no hearing loss, tinnitus, vertigo, nosebleed, nasal congestion, rhinorrhea, sore throat  Respiratory: no cough, pleuritic chest pain, dyspnea, or wheezing  Cardiovascular: no chest pain, angina, dyspnea on exertion, orthopnea, PND, palpitations, or claudication  Gastrointestinal: no nausea, vomiting, heartburn, diarrhea, constipation, bloating,  abdominal pain, or blood per rectum. Appetite is good  Genitourinary: no urinary urgency, frequency, dysuria, nocturia, hesitancy, or incontinence  Musculoskeletal: RIGHT FOOT PAIN.  Ambulating well  Neurologic: no paralysis, paresis, paresthesia, seizures, tremors, or headaches  Hematologic/Lymphatic/Immunologic: no anemia, abnormal bleeding/bruising, fever, chills, night sweats, swollen glands, or unexplained weight loss  Endocrine: no heat or cold intolerance and no polyphagia, polydipsia, or polyuria        OBJECTIVE:     VS:  Wt Readings from Last 3 Encounters:   02/08/22 264 lb (119.7 kg)   01/13/22 230 lb (104.3 kg)   01/06/22 230 lb (104.3 kg)     Temp Readings from Last 3 Encounters:   02/08/22 97 °F (36.1 °C)   01/06/22 98 °F (36.7 °C)   11/04/21 98 °F (36.7 °C)     BP Readings from Last 3 Encounters:   02/08/22 126/86   10/11/21 (!) 153/93   04/19/21 131/78        General appearance: Alert, Awake, Oriented times 3, no distress  Skin: Warm and dry  Head: Normocephalic. No masses, lesions or tenderness noted  Eyes: Conjunctivae appear normal. PERLE  Ears: External ears normal  Nose/Sinuses: Nares normal. Septum midline. Mucosa normal. No drainage  Oropharynx: Oropharynx clear with no exudate seen  Neck: Neck supple. No jugular venous distension, lymphadenopathy or thyromegaly Trachea midline  Chest:  Normal. Movements are Normal and Equal.  Lungs: Lungs clear to auscultation bilaterally. No ronchi, crackles or wheezes  Heart: S1 S2  Regular rate and rhythm. No rub, murmur or gallop  Abdomen: Abdomen soft, non-tender. BS normal. No masses, organomegaly. Back: Grossly Normal and Equal. DTR are Normal. SLR is Normal.  Extremities:  Pedal pulses are normal.  Musculoskeletal: Muscular strength appears intact. No joint effusion, tenderness, swelling or warmth  Neuro:  No focal motor or sensory deficits        ASSESSMENT     Patient Active Problem List    Diagnosis Date Noted    Cigarette nicotine dependence without complication 51/84/4378    Closed displaced fracture of medial cuneiform of right foot with delayed healing 01/13/2022    Lisfranc's sprain, right, initial encounter 01/13/2022    Difficulty walking 01/13/2022    Morbid obesity with BMI of 45.0-49.9, adult ([facility] [address] 04/01/2020        Diagnosis:     ICD-10-CM    1. Encounter to establish care  Z76.89    2. Morbid obesity with BMI of 45.0-49.9, adult ([facility] [address]  E66.01     Z68.42    3. Cigarette nicotine dependence without complication  O46.381        PLAN:           Patient Instructions     LOW CARBOHYDRATE FOR  WEIGHT CONTROL. LOW FAT DIET FOR CHOLESTEROL CONTROL. REGULAR WALKING ADVISED. ADVISED WEIGHT REDUCTION.   ADVISED TO QUIT SMOKING. FASTING FOR LAB WORK ONE MORNING. NEXT APPOINTMENT IN 3 3 WEEKS FOR PRE OP EVALUATION. Return in about 3 weeks (around 3/1/2022) for PRE OP. I have reviewed my findings and recommendations with Gerhardt Cart.     Electronically signed by Tori Evangelista MD on 2/8/22 at 10:35 AM EST

## 2022-02-23 ENCOUNTER — OFFICE VISIT (OUTPATIENT)
Dept: PODIATRY | Age: 22
End: 2022-02-23
Payer: COMMERCIAL

## 2022-02-23 VITALS — HEIGHT: 64 IN | BODY MASS INDEX: 44.39 KG/M2 | WEIGHT: 260 LBS

## 2022-02-23 DIAGNOSIS — S93.621A LISFRANC'S SPRAIN, RIGHT, INITIAL ENCOUNTER: ICD-10-CM

## 2022-02-23 DIAGNOSIS — S93.601A SPRAIN OF RIGHT FOOT, INITIAL ENCOUNTER: ICD-10-CM

## 2022-02-23 DIAGNOSIS — Z76.89 ENCOUNTER TO ESTABLISH CARE: ICD-10-CM

## 2022-02-23 DIAGNOSIS — F17.210 CIGARETTE NICOTINE DEPENDENCE WITHOUT COMPLICATION: ICD-10-CM

## 2022-02-23 DIAGNOSIS — E66.01 MORBID OBESITY WITH BMI OF 45.0-49.9, ADULT (HCC): ICD-10-CM

## 2022-02-23 DIAGNOSIS — S92.241G: Primary | ICD-10-CM

## 2022-02-23 DIAGNOSIS — M79.671 RIGHT FOOT PAIN: ICD-10-CM

## 2022-02-23 LAB
ALBUMIN SERPL-MCNC: 4.6 G/DL (ref 3.5–5.2)
ALP BLD-CCNC: 116 U/L (ref 35–104)
ALT SERPL-CCNC: 14 U/L (ref 0–32)
ANION GAP SERPL CALCULATED.3IONS-SCNC: 13 MMOL/L (ref 7–16)
AST SERPL-CCNC: 15 U/L (ref 0–31)
BASOPHILS ABSOLUTE: 0.07 E9/L (ref 0–0.2)
BASOPHILS RELATIVE PERCENT: 0.9 % (ref 0–2)
BILIRUB SERPL-MCNC: 0.4 MG/DL (ref 0–1.2)
BUN BLDV-MCNC: 11 MG/DL (ref 6–20)
CALCIUM SERPL-MCNC: 9.5 MG/DL (ref 8.6–10.2)
CHLORIDE BLD-SCNC: 103 MMOL/L (ref 98–107)
CHOLESTEROL, TOTAL: 220 MG/DL (ref 0–199)
CO2: 22 MMOL/L (ref 22–29)
CREAT SERPL-MCNC: 0.7 MG/DL (ref 0.5–1)
EOSINOPHILS ABSOLUTE: 0.41 E9/L (ref 0.05–0.5)
EOSINOPHILS RELATIVE PERCENT: 5.1 % (ref 0–6)
GFR AFRICAN AMERICAN: >60
GFR NON-AFRICAN AMERICAN: >60 ML/MIN/1.73
GLUCOSE BLD-MCNC: 92 MG/DL (ref 74–99)
HCT VFR BLD CALC: 41.7 % (ref 34–48)
HDLC SERPL-MCNC: 37 MG/DL
HEMOGLOBIN: 13.5 G/DL (ref 11.5–15.5)
IMMATURE GRANULOCYTES #: 0.02 E9/L
IMMATURE GRANULOCYTES %: 0.2 % (ref 0–5)
LDL CHOLESTEROL CALCULATED: 127 MG/DL (ref 0–99)
LYMPHOCYTES ABSOLUTE: 2.71 E9/L (ref 1.5–4)
LYMPHOCYTES RELATIVE PERCENT: 33.4 % (ref 20–42)
MCH RBC QN AUTO: 27.2 PG (ref 26–35)
MCHC RBC AUTO-ENTMCNC: 32.4 % (ref 32–34.5)
MCV RBC AUTO: 84.1 FL (ref 80–99.9)
MONOCYTES ABSOLUTE: 0.52 E9/L (ref 0.1–0.95)
MONOCYTES RELATIVE PERCENT: 6.4 % (ref 2–12)
NEUTROPHILS ABSOLUTE: 4.38 E9/L (ref 1.8–7.3)
NEUTROPHILS RELATIVE PERCENT: 54 % (ref 43–80)
PDW BLD-RTO: 13.2 FL (ref 11.5–15)
PLATELET # BLD: 130 E9/L (ref 130–450)
PMV BLD AUTO: 14.1 FL (ref 7–12)
POTASSIUM SERPL-SCNC: 4.2 MMOL/L (ref 3.5–5)
RBC # BLD: 4.96 E12/L (ref 3.5–5.5)
SODIUM BLD-SCNC: 138 MMOL/L (ref 132–146)
TOTAL PROTEIN: 7 G/DL (ref 6.4–8.3)
TRIGL SERPL-MCNC: 280 MG/DL (ref 0–149)
VITAMIN D 25-HYDROXY: 16 NG/ML (ref 30–100)
VLDLC SERPL CALC-MCNC: 56 MG/DL
WBC # BLD: 8.1 E9/L (ref 4.5–11.5)

## 2022-02-23 PROCEDURE — 29580 STRAPPING UNNA BOOT: CPT | Performed by: PODIATRIST

## 2022-02-23 PROCEDURE — G8484 FLU IMMUNIZE NO ADMIN: HCPCS | Performed by: PODIATRIST

## 2022-02-23 PROCEDURE — G8417 CALC BMI ABV UP PARAM F/U: HCPCS | Performed by: PODIATRIST

## 2022-02-23 PROCEDURE — G8427 DOCREV CUR MEDS BY ELIG CLIN: HCPCS | Performed by: PODIATRIST

## 2022-02-23 PROCEDURE — 4004F PT TOBACCO SCREEN RCVD TLK: CPT | Performed by: PODIATRIST

## 2022-02-23 NOTE — PROGRESS NOTES
22     Ben Espinoza    : 2000   Sex: female    Age: 24 y.o. Patient's PCP/Provider is:  Jeannie Espinal MD    Subjective:  Patient is seen today for evaluation regarding continued evaluation regarding delayed healing medial cuneiform fracture with Lisfranc sprain right foot. Patient presented today to discuss outpatient surgical intervention. Recently patient did injure her right foot slipping on the ice while shopping. She did notice increased swelling and pain into the right midfoot region. No other additional abnormalities noted at this time. Chief Complaint   Patient presents with    Follow-up     discuss sx       ROS:  Const: Positives and pertinent negatives as per HPI. Musculo: Denies symptoms other than stated above. Neuro: Denies symptoms other than stated above. Skin: Denies symptoms other than stated above. Current Medications:  No current outpatient medications on file. Allergies: Allergies   Allergen Reactions    Aspirin      Has ITP       Vitals:    22 1458   Weight: 260 lb (117.9 kg)   Height: 5' 4\" (1.626 m)       Exam:  VASCULAR: Pedal pulses palpable right foot. Capillary refill time brisk digits 1 through 5 right foot. NEUROLOGICAL: Epicritic sensations intact right lower extremity  DERMATOLOGICAL: Edema noted into the right midfoot region without ecchymotic skin changes present. No skin abrasions or any signs of infection noted right foot. No plantar calluses or heel fissuring noted right foot. MUSCULOSKELETAL: Tenderness noted to palpation into the Lisfranc joint region right foot. Tenderness also noted to palpation medial cuneiform right midfoot. Limited range of motion right subtalar joint, midtarsal joint, and MTPJ's secondary to patient guarding and swelling present. Antalgic gait noted upon evaluation. Diagnostic Studies:     No results found. Procedures: An unna boot  compressive wrap was applied to the right lower extremity. It's purpose is to  decrease  the amount of edema present, and to allow proper healing of the soft tissues. The patient was instructed to keep the unna boot clean, dry and intact until the next follow up visit. The patient was instructed to look for signs of redness, irritation, blistering and pain. If these or any other symptoms were to develop, they were advised to contact the office immediately for reevaluation. Plan Per Assessment  Mari Otto was seen today for follow-up. Diagnoses and all orders for this visit:    Closed displaced fracture of medial cuneiform of right foot with delayed healing    Lisfranc's sprain, right, initial encounter    Sprain of right foot, initial encounter    Right foot pain  -     XR FOOT RIGHT (MIN 3 VIEWS); Future      1. Evaluation and management  2. We did order x-rays right foot which were reviewed with patient due to her recent injury/foot sprain. No acute osseous abnormalities noted. 3. Compression dressing was applied symptomatic right lower extremity as described above. 4. We did discuss outpatient surgical intervention for repair of the delayed medial cuneiform fracture with Lisfranc's injury right foot. 5. The reason for surgery is due to failed conservative treatment and/or conservative treatment is not a viable option. It was discussed with the patient that compliance postoperatively is of utmost importance. Any deviation on behalf of the patient will decrease the chances of a successful outcome. The risks of surgery were discussed in detail with the patient. They include but are not limited to: Infection, failure, prolonged pain, swelling, numbness, recurrence, limited mobility, painful scar, reflex sympathetic dystrophy, over correction, under correction, and loss of limb/life. It was also discussed in detail that no guarantees could be made in regards to a good cosmetic result. The patient understands all of the potential complications.  All questions were thoroughly answered and the patient consented to proceed with the proposed surgery. Consent is located in the patient record. The patient was counseled at length about the risks of aneudy Covid-19 during their perioperative period and any recovery window from their procedure. The patient was made aware that aneudy Covid-19  may worsen their prognosis for recovering from their procedure  and lend to a higher morbidity and/or mortality risk. All material risks, benefits, and reasonable alternatives including postponing the procedure were discussed. The patient does wish to proceed with the procedure at this time. 6. Patient will be notified of the exact date and time procedure is scheduled once insurance verification is performed. All evaluations, labs, and testing will be performed prior to scheduling the procedure. Patient was advised to call the office with any questions or concerns in the interim. She was advised on purchasing or renting a knee scooter to be utilized postoperatively to offload surgical site right lower extremity. Seen By:    Joana Reynoso DPM    Electronically signed by Joana Reynoso DPM on 2/23/2022 at 3:47 PM    This note was created using voice recognition software. The note was reviewed however may contain grammatical errors.

## 2022-03-03 ENCOUNTER — OFFICE VISIT (OUTPATIENT)
Dept: FAMILY MEDICINE CLINIC | Age: 22
End: 2022-03-03
Payer: COMMERCIAL

## 2022-03-03 VITALS
HEART RATE: 82 BPM | BODY MASS INDEX: 45.66 KG/M2 | OXYGEN SATURATION: 98 % | WEIGHT: 266 LBS | TEMPERATURE: 97.4 F | DIASTOLIC BLOOD PRESSURE: 86 MMHG | SYSTOLIC BLOOD PRESSURE: 128 MMHG

## 2022-03-03 DIAGNOSIS — E55.9 HYPOVITAMINOSIS D: ICD-10-CM

## 2022-03-03 DIAGNOSIS — E66.01 MORBID OBESITY WITH BMI OF 45.0-49.9, ADULT (HCC): ICD-10-CM

## 2022-03-03 DIAGNOSIS — M79.671 RIGHT FOOT PAIN: ICD-10-CM

## 2022-03-03 DIAGNOSIS — Z01.818 PRE-OP EVALUATION: Primary | ICD-10-CM

## 2022-03-03 DIAGNOSIS — E78.2 MIXED HYPERCHOLESTEROLEMIA AND HYPERTRIGLYCERIDEMIA: ICD-10-CM

## 2022-03-03 DIAGNOSIS — F17.210 CIGARETTE NICOTINE DEPENDENCE WITHOUT COMPLICATION: ICD-10-CM

## 2022-03-03 PROCEDURE — 4004F PT TOBACCO SCREEN RCVD TLK: CPT | Performed by: FAMILY MEDICINE

## 2022-03-03 PROCEDURE — G8417 CALC BMI ABV UP PARAM F/U: HCPCS | Performed by: FAMILY MEDICINE

## 2022-03-03 PROCEDURE — G8427 DOCREV CUR MEDS BY ELIG CLIN: HCPCS | Performed by: FAMILY MEDICINE

## 2022-03-03 PROCEDURE — G8484 FLU IMMUNIZE NO ADMIN: HCPCS | Performed by: FAMILY MEDICINE

## 2022-03-03 PROCEDURE — 99215 OFFICE O/P EST HI 40 MIN: CPT | Performed by: FAMILY MEDICINE

## 2022-03-03 RX ORDER — ERGOCALCIFEROL 1.25 MG/1
50000 CAPSULE ORAL WEEKLY
Qty: 12 CAPSULE | Refills: 1 | Status: SHIPPED | OUTPATIENT
Start: 2022-03-03

## 2022-03-03 RX ORDER — VARENICLINE TARTRATE
KIT
Qty: 1 BOX | Refills: 0 | Status: SHIPPED
Start: 2022-03-03 | End: 2022-03-10 | Stop reason: ALTCHOICE

## 2022-03-03 NOTE — PROGRESS NOTES
PRE-OP  EVALUATION NOTE. HISTORY OF PRESENT ILLNESS:      The patient is a 24 y.o. female patient Elio Benites MD who presents with PRE OP EVALUATION. SURGERY PLANNED BY DR. Fercho Alegria FOR RIGHT FOOT PROBLEM. NO SYMPTOMS OR PROBLEM CURRENTLY. Past Medical History:   Diagnosis Date    Autoimmune disorder ([facility] [address]     ITP    BMI 45.0-49.9, adult ([facility] [address] 2020    Recommended consideration of growth at 32 weeks and weekly  testing per COVID 19 recommendations      delivery delivered 7/15/2020    Closed displaced fracture of medial cuneiform of right foot with delayed healing 2022    Disease of blood and blood forming organ     blood clotting issue    History of  delivery affecting pregnancy 2020    Hypertension            Past Surgical History:   Procedure Laterality Date    ARM SURGERY Left      SECTION       SECTION N/A 7/15/2020    REPEAT  SECTION performed by Avril Michelle MD at [facility] L[facility]D [facility]       Medications Prior to Admission:      Current Outpatient Medications:     vitamin D (ERGOCALCIFEROL) 1.25 MG (85070 UT) CAPS capsule, Take 1 capsule by mouth once a week, Disp: 12 capsule, Rfl: 1    varenicline (CHANTIX STARTING MONTH ) 0.5 MG X 11 & 1 MG X 42 tablet, Take by mouth., Disp: 1 box, Rfl: 0    Allergies:    Aspirin    Social History:    reports that she has been smoking. She has a 1.25 pack-year smoking history. She has never used smokeless tobacco. She reports that she does not drink alcohol and does not use drugs. Family History:   family history includes Heart Disease in her maternal grandfather; Thyroid Disease in her mother. REVIEW OF SYSTEMS:  GENERAL:APPETITE-GOOD  EENT:NO SORE THROAT,EARACHE, HEADACHE OR CONGESTION  GI: BOWEL- NORMAL. NO NAUSEA  OR VOMITING. NO ABDOMINAL PAINS. : NO URINARY SYMPTOMS. MUSCULOSKELETAL: NO PAIN. MOVEMENTS ARE NORMAL.   NEUROLOGY:NO DIZZINESS,PAIN OR OTHER PLAN:  Patient Instructions      LOW CARBOHYDRATE FOR  WEIGHT CONTROL. LOW FAT DIET FOR CHOLESTEROL CONTROL. TAKE VITAMIN D-3 41918 UNITS WEEKLY FOR VITAMIN DEFICIENCY. TAKE CHANTIX AS DIRECTED. ADVISED TO QUIT SMOKING AFTER 2 WEEKS. Sm[place] REGULAR WALKING ADVISED. ADVISED WEIGHT REDUCTION. OK FOR SURGERY WITH MODERATE RISK FOR GENERAL ANESTHESIA. NEXT APPOINTMENT IN 1 MONTH. Return in about 1 month (around 4/3/2022) for FOLLOW UP VISIT.        Note that over 50 minutes was spent in evaluation of the patient, review of the chart and pertinent records, discussion with family/staff, etc    Ang Zamudio MD MD  9:27 AM  3/3/2022

## 2022-03-03 NOTE — PATIENT INSTRUCTIONS
LOW CARBOHYDRATE FOR  WEIGHT CONTROL. LOW FAT DIET FOR CHOLESTEROL CONTROL. TAKE VITAMIN D-3 85677 UNITS WEEKLY FOR VITAMIN DEFICIENCY. TAKE CHANTIX AS DIRECTED. ADVISED TO QUIT SMOKING AFTER 2 WEEKS. Jan Forrest REGULAR WALKING ADVISED. ADVISED WEIGHT REDUCTION. OK FOR SURGERY WITH MODERATE RISK FOR GENERAL ANESTHESIA. NEXT APPOINTMENT IN 1 MONTH.

## 2022-03-10 RX ORDER — BUPROPION HYDROCHLORIDE 100 MG/1
100 TABLET, EXTENDED RELEASE ORAL 2 TIMES DAILY
Qty: 60 TABLET | Refills: 3 | Status: SHIPPED | OUTPATIENT
Start: 2022-03-10

## 2022-03-11 ENCOUNTER — HOSPITAL ENCOUNTER (OUTPATIENT)
Age: 22
Discharge: HOME | End: 2022-03-11
Payer: COMMERCIAL

## 2022-03-11 LAB
EKG ATRIAL RATE: 86 BPM
EKG P AXIS: 34 DEGREES
EKG P-R INTERVAL: 134 MS
EKG Q-T INTERVAL: 392 MS
EKG QRS DURATION: 104 MS
EKG QTC CALCULATION (BAZETT): 469 MS
EKG R AXIS: 32 DEGREES
EKG T AXIS: 50 DEGREES
EKG VENTRICULAR RATE: 86 BPM

## 2022-03-11 PROCEDURE — 93005 ELECTROCARDIOGRAM TRACING: CPT | Performed by: ANESTHESIOLOGY

## 2022-03-16 ENCOUNTER — PREP FOR PROCEDURE (OUTPATIENT)
Dept: PODIATRY | Age: 22
End: 2022-03-16

## 2022-03-16 ENCOUNTER — ANESTHESIA EVENT (OUTPATIENT)
Dept: OPERATING ROOM | Age: 22
End: 2022-03-16
Payer: COMMERCIAL

## 2022-03-16 RX ORDER — SODIUM CHLORIDE 0.9 % (FLUSH) 0.9 %
10 SYRINGE (ML) INJECTION EVERY 12 HOURS SCHEDULED
Status: CANCELLED | OUTPATIENT
Start: 2022-03-16

## 2022-03-16 RX ORDER — SODIUM CHLORIDE 9 MG/ML
25 INJECTION, SOLUTION INTRAVENOUS PRN
Status: CANCELLED | OUTPATIENT
Start: 2022-03-16

## 2022-03-16 RX ORDER — SODIUM CHLORIDE 0.9 % (FLUSH) 0.9 %
10 SYRINGE (ML) INJECTION PRN
Status: CANCELLED | OUTPATIENT
Start: 2022-03-16

## 2022-03-16 ASSESSMENT — LIFESTYLE VARIABLES: SMOKING_STATUS: 1

## 2022-03-16 NOTE — ANESTHESIA PRE PROCEDURE
Department of Anesthesiology  Preprocedure Note       Name:  Marcelino Bains   Age:  24 y.o.  :  2000                                          MRN:  [id number]         Date:  3/16/2022      Surgeon: Elizabeth Mittal):  Inocente Pantoja DPM    Procedure: OPEN TREATMENT MEDIAL CUNEIFORM FRACTURE RIGHT FOOT LISFRANC REPAIR RIGHT FOOT (Right )      Medications prior to admission:   Prior to Admission medications    Medication Sig Start Date End Date Taking? Authorizing Provider   buPROPion (WELLBUTRIN SR) 100 MG extended release tablet Take 1 tablet by mouth 2 times daily TAKE 1 TABLET NIGHTLY FOR 3 DAYS AND THEN CONTINUE 2 TIMES A DAY. 3/10/22   Linda Carney MD   vitamin D (ERGOCALCIFEROL) 1.25 MG (02825 UT) CAPS capsule Take 1 capsule by mouth once a week  Patient taking differently: Take 50,000 Units by mouth once a week Thursday 3/3/22   Linda Carney MD       Current medications:    Current Outpatient Medications   Medication Sig Dispense Refill    buPROPion (WELLBUTRIN SR) 100 MG extended release tablet Take 1 tablet by mouth 2 times daily TAKE 1 TABLET NIGHTLY FOR 3 DAYS AND THEN CONTINUE 2 TIMES A DAY. 60 tablet 3    vitamin D (ERGOCALCIFEROL) 1.25 MG (86109 UT) CAPS capsule Take 1 capsule by mouth once a week (Patient taking differently: Take 50,000 Units by mouth once a week Thursday) 12 capsule 1     No current facility-administered medications for this visit. Allergies:     Allergies   Allergen Reactions    Aspirin      Has ITP- blood clotting disorder; causes hemmorhage       Problem List:    Patient Active Problem List   Diagnosis Code    Morbid obesity with BMI of 45.0-49.9, adult ([facility] E66.01, Z68.42    Closed displaced fracture of medial cuneiform of right foot with delayed healing S92.241G    Lisfranc's sprain, right, initial encounter H91.908W    Difficulty walking R26.2    Cigarette nicotine dependence without complication O64.159    Right foot pain M79.671    Mixed hypercholesterolemia and hypertriglyceridemia E78.2    Hypovitaminosis D E55.9       Past Medical History:        Diagnosis Date    Autoimmune disorder ([facility]     ITP- blood clotting disorder    BMI 45.0-49.9, adult ([facility] 2020    Recommended consideration of growth at 32 weeks and weekly  testing per COVID 19 recommendations     Bronchitis      delivery delivered 7/15/2020    Closed displaced fracture of medial cuneiform of right foot with delayed healing 2022    Diabetes mellitus ([facility]     borderline - diet control    Disease of blood and blood forming organ     blood clotting issue    History of  delivery affecting pregnancy 2020    Hypertension     diet control;  with pregnancy only       Past Surgical History:        Procedure Laterality Date    ARM SURGERY Left      SECTION      2 - c-sections     SECTION N/A 7/15/2020    REPEAT  SECTION performed by Cleo Peterson MD at [facility] OR       Social History:    Social History     Tobacco Use    Smoking status: Current Some Day Smoker     Packs/day: 0.25     Years: 5.00     Pack years: 1.25    Smokeless tobacco: Never Used   Substance Use Topics    Alcohol use: Never                                Ready to quit: Not Answered  Counseling given: Not Answered      Vital Signs (Current): There were no vitals filed for this visit.                                            BP Readings from Last 3 Encounters:   22 128/86   22 126/86   10/11/21 (!) 153/93       NPO Status:  >8.H                                                                               BMI:   Wt Readings from Last 3 Encounters:   22 266 lb (120.7 kg)   22 260 lb (117.9 kg)   22 264 lb (119.7 kg)     There is no height or weight on file to calculate BMI.    CBC:   Lab Results   Component Value Date    WBC 8.1 2022    RBC 4.96 2022    HGB 13.5 2022    HCT 41.7 2022    MCV 84.1 02/23/2022    RDW 13.2 02/23/2022     02/23/2022       CMP:   Lab Results   Component Value Date     02/23/2022    K 4.2 02/23/2022    K 3.8 06/17/2020     02/23/2022    CO2 22 02/23/2022    BUN 11 02/23/2022    CREATININE 0.7 02/23/2022    GFRAA >60 02/23/2022    LABGLOM >60 02/23/2022    GLUCOSE 92 02/23/2022    PROT 7.0 02/23/2022    CALCIUM 9.5 02/23/2022    BILITOT 0.4 02/23/2022    ALKPHOS 116 02/23/2022    AST 15 02/23/2022    ALT 14 02/23/2022       POC Tests: No results for input(s): POCGLU, POCNA, POCK, POCCL, POCBUN, POCHEMO, POCHCT in the last 72 hours. Coags: No results found for: PROTIME, INR, APTT    HCG (If Applicable): No results found for: PREGTESTUR, PREGSERUM, HCG, HCGQUANT     ABGs: No results found for: PHART, PO2ART, RAL9KPJ, HJJ0LIF, BEART, J5EEOMCX     Type & Screen (If Applicable):  No results found for: LABABO, LABRH    Drug/Infectious Status (If Applicable):  No results found for: HIV, HEPCAB    COVID-19 Screening (If Applicable):   Lab Results   Component Value Date    COVID19 Not Detected 07/10/2020         Anesthesia Evaluation  Patient summary reviewed and Nursing notes reviewed no history of anesthetic complications:   Airway: Mallampati: II  TM distance: >3 FB   Neck ROM: full  Mouth opening: > = 3 FB Dental:      Comment: Denies loose or missing teeth      Pulmonary:Negative Pulmonary ROS and normal exam  breath sounds clear to auscultation  (+) current smoker (0.25 PPD.)          Patient did not smoke on day of surgery. Cardiovascular:  Exercise tolerance: good (>4 METS),   (+) hypertension ( PIH- no medications): moderate,       ECG reviewed  Rhythm: regular  Rate: normal           Beta Blocker:  Not on Beta Blocker      ROS comment: EKG: NSR 86.      Neuro/Psych:   Negative Neuro/Psych ROS              GI/Hepatic/Renal:   (+) GERD: poorly controlled, morbid obesity (BMI: 45.66)          Endo/Other:    (+) Diabetes, blood dyscrasia (ITP)::., .                  ROS comment: Auto Immune Disorder. Abdominal:   (+) obese (BMI: 45.66),           Vascular: negative vascular ROS. Other Findings:             Anesthesia Plan      general     ASA 3     (Prefers GA over SAB)  Induction: intravenous. continuous noninvasive hemodynamic monitor and BIS  MIPS: Postoperative opioids intended and Prophylactic antiemetics administered. Anesthetic plan and risks discussed with patient. Plan discussed with CRNA.     Attending anesthesiologist reviewed and agrees with Shayne Peter MD   3/16/2022

## 2022-03-17 ENCOUNTER — HOSPITAL ENCOUNTER (OUTPATIENT)
Dept: OPERATING ROOM | Age: 22
Setting detail: OUTPATIENT SURGERY
Discharge: HOME | End: 2022-03-17
Attending: PODIATRIST
Payer: COMMERCIAL

## 2022-03-17 ENCOUNTER — ANESTHESIA (OUTPATIENT)
Dept: OPERATING ROOM | Age: 22
End: 2022-03-17
Payer: COMMERCIAL

## 2022-03-17 ENCOUNTER — HOSPITAL ENCOUNTER (OUTPATIENT)
Age: 22
Setting detail: OUTPATIENT SURGERY
Discharge: HOME | End: 2022-03-17
Attending: PODIATRIST | Admitting: PODIATRIST
Payer: COMMERCIAL

## 2022-03-17 VITALS
HEIGHT: 64 IN | BODY MASS INDEX: 44.73 KG/M2 | HEART RATE: 94 BPM | TEMPERATURE: 97.4 F | DIASTOLIC BLOOD PRESSURE: 93 MMHG | RESPIRATION RATE: 16 BRPM | SYSTOLIC BLOOD PRESSURE: 151 MMHG | OXYGEN SATURATION: 99 % | WEIGHT: 262 LBS

## 2022-03-17 VITALS
SYSTOLIC BLOOD PRESSURE: 163 MMHG | DIASTOLIC BLOOD PRESSURE: 88 MMHG | TEMPERATURE: 97.3 F | RESPIRATION RATE: 14 BRPM | OXYGEN SATURATION: 100 %

## 2022-03-17 DIAGNOSIS — T14.8XXA FRACTURE: ICD-10-CM

## 2022-03-17 DIAGNOSIS — S93.621A LISFRANC'S SPRAIN, RIGHT, INITIAL ENCOUNTER: ICD-10-CM

## 2022-03-17 DIAGNOSIS — R26.2 DIFFICULTY WALKING: ICD-10-CM

## 2022-03-17 DIAGNOSIS — S92.241G: ICD-10-CM

## 2022-03-17 DIAGNOSIS — M79.671 RIGHT FOOT PAIN: Primary | ICD-10-CM

## 2022-03-17 LAB
HCG, URINE, POC: NEGATIVE
Lab: NORMAL
METER GLUCOSE: 93 MG/DL (ref 74–99)
NEGATIVE QC PASS/FAIL: NORMAL
POSITIVE QC PASS/FAIL: NORMAL

## 2022-03-17 PROCEDURE — 2500000003 HC RX 250 WO HCPCS: Performed by: NURSE ANESTHETIST, CERTIFIED REGISTERED

## 2022-03-17 PROCEDURE — 7100000011 HC PHASE II RECOVERY - ADDTL 15 MIN: Performed by: PODIATRIST

## 2022-03-17 PROCEDURE — 7100000000 HC PACU RECOVERY - FIRST 15 MIN: Performed by: PODIATRIST

## 2022-03-17 PROCEDURE — 3600000014 HC SURGERY LEVEL 4 ADDTL 15MIN: Performed by: PODIATRIST

## 2022-03-17 PROCEDURE — 28465 OPTX TARSAL BONE FX EACH: CPT | Performed by: PODIATRIST

## 2022-03-17 PROCEDURE — 81025 URINE PREGNANCY TEST: CPT | Performed by: PODIATRIST

## 2022-03-17 PROCEDURE — 7100000010 HC PHASE II RECOVERY - FIRST 15 MIN: Performed by: PODIATRIST

## 2022-03-17 PROCEDURE — 2580000003 HC RX 258: Performed by: PODIATRIST

## 2022-03-17 PROCEDURE — 2580000003 HC RX 258: Performed by: ANESTHESIOLOGY

## 2022-03-17 PROCEDURE — 6360000002 HC RX W HCPCS: Performed by: NURSE ANESTHETIST, CERTIFIED REGISTERED

## 2022-03-17 PROCEDURE — 82962 GLUCOSE BLOOD TEST: CPT | Performed by: PODIATRIST

## 2022-03-17 PROCEDURE — 3600000004 HC SURGERY LEVEL 4 BASE: Performed by: PODIATRIST

## 2022-03-17 PROCEDURE — 82962 GLUCOSE BLOOD TEST: CPT

## 2022-03-17 PROCEDURE — 3700000000 HC ANESTHESIA ATTENDED CARE: Performed by: PODIATRIST

## 2022-03-17 PROCEDURE — 2500000003 HC RX 250 WO HCPCS: Performed by: PODIATRIST

## 2022-03-17 PROCEDURE — 7100000001 HC PACU RECOVERY - ADDTL 15 MIN: Performed by: PODIATRIST

## 2022-03-17 PROCEDURE — 6360000002 HC RX W HCPCS: Performed by: PODIATRIST

## 2022-03-17 PROCEDURE — C1713 ANCHOR/SCREW BN/BN,TIS/BN: HCPCS | Performed by: PODIATRIST

## 2022-03-17 PROCEDURE — 3700000001 HC ADD 15 MINUTES (ANESTHESIA): Performed by: PODIATRIST

## 2022-03-17 PROCEDURE — 6370000000 HC RX 637 (ALT 250 FOR IP): Performed by: ANESTHESIOLOGY

## 2022-03-17 PROCEDURE — 3209999900 FLUORO FOR SURGICAL PROCEDURES

## 2022-03-17 PROCEDURE — 2709999900 HC NON-CHARGEABLE SUPPLY: Performed by: PODIATRIST

## 2022-03-17 PROCEDURE — 6370000000 HC RX 637 (ALT 250 FOR IP)

## 2022-03-17 DEVICE — IMPLANTABLE DEVICE: Type: IMPLANTABLE DEVICE | Site: FOOT | Status: FUNCTIONAL

## 2022-03-17 DEVICE — IMPLANTABLE DEVICE: Type: IMPLANTABLE DEVICE | Status: FUNCTIONAL

## 2022-03-17 DEVICE — SCREW BONE L28MM DIA3.5MM TI LO PROF FOR ANK FX SET: Type: IMPLANTABLE DEVICE | Site: FOOT | Status: FUNCTIONAL

## 2022-03-17 RX ORDER — HYDROCODONE BITARTRATE AND ACETAMINOPHEN 5; 325 MG/1; MG/1
1 TABLET ORAL EVERY 6 HOURS PRN
Status: DISCONTINUED | OUTPATIENT
Start: 2022-03-17 | End: 2022-03-17 | Stop reason: HOSPADM

## 2022-03-17 RX ORDER — DIPHENHYDRAMINE HYDROCHLORIDE 50 MG/ML
INJECTION INTRAMUSCULAR; INTRAVENOUS PRN
Status: DISCONTINUED | OUTPATIENT
Start: 2022-03-17 | End: 2022-03-17 | Stop reason: SDUPTHER

## 2022-03-17 RX ORDER — GLYCOPYRROLATE 1 MG/5 ML
SYRINGE (ML) INTRAVENOUS PRN
Status: DISCONTINUED | OUTPATIENT
Start: 2022-03-17 | End: 2022-03-17 | Stop reason: SDUPTHER

## 2022-03-17 RX ORDER — DEXAMETHASONE SODIUM PHOSPHATE 10 MG/ML
INJECTION, SOLUTION INTRAMUSCULAR; INTRAVENOUS PRN
Status: DISCONTINUED | OUTPATIENT
Start: 2022-03-17 | End: 2022-03-17 | Stop reason: SDUPTHER

## 2022-03-17 RX ORDER — MIDAZOLAM HYDROCHLORIDE 1 MG/ML
INJECTION INTRAMUSCULAR; INTRAVENOUS PRN
Status: DISCONTINUED | OUTPATIENT
Start: 2022-03-17 | End: 2022-03-17 | Stop reason: SDUPTHER

## 2022-03-17 RX ORDER — PROPOFOL 10 MG/ML
INJECTION, EMULSION INTRAVENOUS PRN
Status: DISCONTINUED | OUTPATIENT
Start: 2022-03-17 | End: 2022-03-17 | Stop reason: SDUPTHER

## 2022-03-17 RX ORDER — SODIUM CHLORIDE 0.9 % (FLUSH) 0.9 %
5-40 SYRINGE (ML) INJECTION PRN
Status: DISCONTINUED | OUTPATIENT
Start: 2022-03-17 | End: 2022-03-17 | Stop reason: HOSPADM

## 2022-03-17 RX ORDER — MORPHINE SULFATE 2 MG/ML
1 INJECTION, SOLUTION INTRAMUSCULAR; INTRAVENOUS EVERY 5 MIN PRN
Status: DISCONTINUED | OUTPATIENT
Start: 2022-03-17 | End: 2022-03-17 | Stop reason: HOSPADM

## 2022-03-17 RX ORDER — HYDROCODONE BITARTRATE AND ACETAMINOPHEN 5; 325 MG/1; MG/1
TABLET ORAL
Status: COMPLETED
Start: 2022-03-17 | End: 2022-03-17

## 2022-03-17 RX ORDER — SODIUM CHLORIDE, SODIUM LACTATE, POTASSIUM CHLORIDE, CALCIUM CHLORIDE 600; 310; 30; 20 MG/100ML; MG/100ML; MG/100ML; MG/100ML
INJECTION, SOLUTION INTRAVENOUS CONTINUOUS
Status: DISCONTINUED | OUTPATIENT
Start: 2022-03-17 | End: 2022-03-17 | Stop reason: HOSPADM

## 2022-03-17 RX ORDER — HYDROCODONE BITARTRATE AND ACETAMINOPHEN 5; 325 MG/1; MG/1
1 TABLET ORAL EVERY 6 HOURS PRN
Qty: 28 TABLET | Refills: 0 | Status: SHIPPED | OUTPATIENT
Start: 2022-03-17 | End: 2022-03-24

## 2022-03-17 RX ORDER — SODIUM CHLORIDE 9 MG/ML
25 INJECTION, SOLUTION INTRAVENOUS PRN
Status: DISCONTINUED | OUTPATIENT
Start: 2022-03-17 | End: 2022-03-17 | Stop reason: HOSPADM

## 2022-03-17 RX ORDER — BUPIVACAINE HYDROCHLORIDE 5 MG/ML
INJECTION, SOLUTION PERINEURAL PRN
Status: DISCONTINUED | OUTPATIENT
Start: 2022-03-17 | End: 2022-03-17 | Stop reason: ALTCHOICE

## 2022-03-17 RX ORDER — SODIUM CHLORIDE 0.9 % (FLUSH) 0.9 %
10 SYRINGE (ML) INJECTION PRN
Status: DISCONTINUED | OUTPATIENT
Start: 2022-03-17 | End: 2022-03-17 | Stop reason: HOSPADM

## 2022-03-17 RX ORDER — METOCLOPRAMIDE 10 MG/1
10 TABLET ORAL ONCE
Status: COMPLETED | OUTPATIENT
Start: 2022-03-17 | End: 2022-03-17

## 2022-03-17 RX ORDER — FENTANYL CITRATE 50 UG/ML
50 INJECTION, SOLUTION INTRAMUSCULAR; INTRAVENOUS EVERY 5 MIN PRN
Status: DISCONTINUED | OUTPATIENT
Start: 2022-03-17 | End: 2022-03-17 | Stop reason: HOSPADM

## 2022-03-17 RX ORDER — SODIUM CHLORIDE 0.9 % (FLUSH) 0.9 %
10 SYRINGE (ML) INJECTION EVERY 12 HOURS SCHEDULED
Status: DISCONTINUED | OUTPATIENT
Start: 2022-03-17 | End: 2022-03-17 | Stop reason: HOSPADM

## 2022-03-17 RX ORDER — SODIUM CHLORIDE 0.9 % (FLUSH) 0.9 %
5-40 SYRINGE (ML) INJECTION EVERY 12 HOURS SCHEDULED
Status: DISCONTINUED | OUTPATIENT
Start: 2022-03-17 | End: 2022-03-17 | Stop reason: HOSPADM

## 2022-03-17 RX ORDER — FAMOTIDINE 20 MG/1
20 TABLET, FILM COATED ORAL ONCE
Status: COMPLETED | OUTPATIENT
Start: 2022-03-17 | End: 2022-03-17

## 2022-03-17 RX ORDER — MEPERIDINE HYDROCHLORIDE 25 MG/ML
12.5 INJECTION INTRAMUSCULAR; INTRAVENOUS; SUBCUTANEOUS EVERY 5 MIN PRN
Status: DISCONTINUED | OUTPATIENT
Start: 2022-03-17 | End: 2022-03-17 | Stop reason: HOSPADM

## 2022-03-17 RX ORDER — DIPHENHYDRAMINE HYDROCHLORIDE 50 MG/ML
12.5 INJECTION INTRAMUSCULAR; INTRAVENOUS
Status: DISCONTINUED | OUTPATIENT
Start: 2022-03-17 | End: 2022-03-17 | Stop reason: HOSPADM

## 2022-03-17 RX ORDER — FENTANYL CITRATE 50 UG/ML
INJECTION, SOLUTION INTRAMUSCULAR; INTRAVENOUS PRN
Status: DISCONTINUED | OUTPATIENT
Start: 2022-03-17 | End: 2022-03-17 | Stop reason: SDUPTHER

## 2022-03-17 RX ORDER — ONDANSETRON 2 MG/ML
INJECTION INTRAMUSCULAR; INTRAVENOUS PRN
Status: DISCONTINUED | OUTPATIENT
Start: 2022-03-17 | End: 2022-03-17 | Stop reason: SDUPTHER

## 2022-03-17 RX ADMIN — DIPHENHYDRAMINE HYDROCHLORIDE 12.5 MG: 50 INJECTION, SOLUTION INTRAMUSCULAR; INTRAVENOUS at 11:20

## 2022-03-17 RX ADMIN — FENTANYL CITRATE 50 MCG: 50 INJECTION, SOLUTION INTRAMUSCULAR; INTRAVENOUS at 10:59

## 2022-03-17 RX ADMIN — Medication 0.2 MG: at 10:23

## 2022-03-17 RX ADMIN — FENTANYL CITRATE 50 MCG: 50 INJECTION, SOLUTION INTRAMUSCULAR; INTRAVENOUS at 10:35

## 2022-03-17 RX ADMIN — METOCLOPRAMIDE 10 MG: 10 TABLET ORAL at 09:17

## 2022-03-17 RX ADMIN — ONDANSETRON 4 MG: 2 INJECTION INTRAMUSCULAR; INTRAVENOUS at 11:20

## 2022-03-17 RX ADMIN — FENTANYL CITRATE 50 MCG: 50 INJECTION, SOLUTION INTRAMUSCULAR; INTRAVENOUS at 11:20

## 2022-03-17 RX ADMIN — FAMOTIDINE 20 MG: 20 TABLET ORAL at 09:17

## 2022-03-17 RX ADMIN — PROPOFOL 200 MG: 10 INJECTION, EMULSION INTRAVENOUS at 10:26

## 2022-03-17 RX ADMIN — FENTANYL CITRATE 50 MCG: 50 INJECTION, SOLUTION INTRAMUSCULAR; INTRAVENOUS at 10:26

## 2022-03-17 RX ADMIN — MIDAZOLAM 2 MG: 1 INJECTION INTRAMUSCULAR; INTRAVENOUS at 10:19

## 2022-03-17 RX ADMIN — FENTANYL CITRATE 50 MCG: 50 INJECTION, SOLUTION INTRAMUSCULAR; INTRAVENOUS at 10:24

## 2022-03-17 RX ADMIN — DEXAMETHASONE SODIUM PHOSPHATE 10 MG: 10 INJECTION, SOLUTION INTRAMUSCULAR; INTRAVENOUS at 10:32

## 2022-03-17 RX ADMIN — CEFAZOLIN 3000 MG: 1 INJECTION, POWDER, FOR SOLUTION INTRAMUSCULAR; INTRAVENOUS at 10:22

## 2022-03-17 RX ADMIN — SODIUM CHLORIDE, POTASSIUM CHLORIDE, SODIUM LACTATE AND CALCIUM CHLORIDE: 600; 310; 30; 20 INJECTION, SOLUTION INTRAVENOUS at 09:17

## 2022-03-17 RX ADMIN — HYDROCODONE BITARTRATE AND ACETAMINOPHEN 1 TABLET: 5; 325 TABLET ORAL at 12:04

## 2022-03-17 ASSESSMENT — PULMONARY FUNCTION TESTS
PIF_VALUE: 14
PIF_VALUE: 18
PIF_VALUE: 11
PIF_VALUE: 11
PIF_VALUE: 38
PIF_VALUE: 11
PIF_VALUE: 20
PIF_VALUE: 21
PIF_VALUE: 19
PIF_VALUE: 19
PIF_VALUE: 27
PIF_VALUE: 17
PIF_VALUE: 21
PIF_VALUE: 17
PIF_VALUE: 12
PIF_VALUE: 22
PIF_VALUE: 20
PIF_VALUE: 17
PIF_VALUE: 18
PIF_VALUE: 21
PIF_VALUE: 21
PIF_VALUE: 20
PIF_VALUE: 15
PIF_VALUE: 19
PIF_VALUE: 20
PIF_VALUE: 11
PIF_VALUE: 21
PIF_VALUE: 21
PIF_VALUE: 20
PIF_VALUE: 22
PIF_VALUE: 21
PIF_VALUE: 18
PIF_VALUE: 19
PIF_VALUE: 15
PIF_VALUE: 18
PIF_VALUE: 12
PIF_VALUE: 17
PIF_VALUE: 20
PIF_VALUE: 17
PIF_VALUE: 17
PIF_VALUE: 19
PIF_VALUE: 18
PIF_VALUE: 18
PIF_VALUE: 11
PIF_VALUE: 15
PIF_VALUE: 18
PIF_VALUE: 21
PIF_VALUE: 17
PIF_VALUE: 20
PIF_VALUE: 19
PIF_VALUE: 11
PIF_VALUE: 20
PIF_VALUE: 17
PIF_VALUE: 1
PIF_VALUE: 20
PIF_VALUE: 18
PIF_VALUE: 15
PIF_VALUE: 18
PIF_VALUE: 18
PIF_VALUE: 13
PIF_VALUE: 21
PIF_VALUE: 25
PIF_VALUE: 1
PIF_VALUE: 18
PIF_VALUE: 19

## 2022-03-17 ASSESSMENT — PAIN DESCRIPTION - PAIN TYPE
TYPE: SURGICAL PAIN

## 2022-03-17 ASSESSMENT — PAIN SCALES - GENERAL
PAINLEVEL_OUTOF10: 3
PAINLEVEL_OUTOF10: 4
PAINLEVEL_OUTOF10: 9
PAINLEVEL_OUTOF10: 6
PAINLEVEL_OUTOF10: 0

## 2022-03-17 ASSESSMENT — PAIN - FUNCTIONAL ASSESSMENT
PAIN_FUNCTIONAL_ASSESSMENT: 0-10
PAIN_FUNCTIONAL_ASSESSMENT: PREVENTS OR INTERFERES SOME ACTIVE ACTIVITIES AND ADLS

## 2022-03-17 ASSESSMENT — PAIN DESCRIPTION - ORIENTATION: ORIENTATION: RIGHT

## 2022-03-17 ASSESSMENT — PAIN DESCRIPTION - LOCATION: LOCATION: FOOT

## 2022-03-17 ASSESSMENT — PAIN DESCRIPTION - DESCRIPTORS: DESCRIPTORS: ACHING

## 2022-03-17 ASSESSMENT — PAIN DESCRIPTION - PROGRESSION: CLINICAL_PROGRESSION: GRADUALLY IMPROVING

## 2022-03-17 NOTE — OP NOTE
Operative Note      Patient: Jose Madison  YOB: 2000  MRN: [id number]    Date of Procedure: 3/17/2022    Pre-Op Diagnosis: 1. Closed, displaced fracture medial cuneiform right foot with delayed healing-S92.241G  2. Lisfranc sprain right foot-S93.621A  3. Pain in right foot-M79.671    Post-Op Diagnosis: Same       Procedure(s):  OPEN TREATMENT MEDIAL CUNEIFORM FRACTURE RIGHT FOOT LISFRANC REPAIR RIGHT FOOT    Surgeon(s):  Melissa Biswas DPM    Assistant:   * No surgical staff found *    Anesthesia: General    Estimated Blood Loss (mL): Minimal    Complications: None    Specimens:   * No specimens in log *    Implants:  Implant Name Type Inv. Item Serial No.  Lot No. LRB No. Used Action   SCREW BNE L30MM DIA3.5MM TRISHA TI MARY LO PROF FOR ANK FRAC - [id number]  SCREW BNE L30MM DIA3.5MM TRISHA TI MARY LO PROF FOR ANK FRAC  [employer]  Right 1 Implanted   SCREW BONE L28MM DIA3. 5MM TI LO PROF FOR ANK FX SET - [id number]  SCREW BONE L28MM DIA3. 5MM TI LO PROF FOR ANK FX SET  [employer]  Right 1 Implanted   PLATE BNE M 4 H R METATARSAL TI FOR ACUTE LISFRANC INJURIES - [id number]  PLATE BNE M 4 H R METATARSAL TI FOR ACUTE LISFRANC INJURIES  [employer]  Right 1 Implanted   SCREW BNE L26MM DIA3.5MM TRISHA ANK TI MARY LO PROF - [id number]  SCREW BNE L26MM DIA3.5MM TRISHA ANK TI MARY LO PROF  [employer]  Right 1 Implanted         Drains: * No LDAs found *    Findings: Chronic pain due to previous injury right midfoot. Detailed Description of Procedure:   After proper preoperative evaluation, patient was brought back to the operative room placed operating table in the supine position. General anesthesia was administered per anesthesia department. Patient did receive 3 g intravenous Ancef preoperatively tourniquet was placed around the patient's well-padded right thigh inflated to 325 mmHg then lowered to the surgical field once proper prepping and draping was performed.   Attention was directed overlying the dorsal/medial right midfoot area with use of fluoroscopic images we were able to localize the base of the first and second metatarsals as well as medial and middle cuneiform regions. At this time approximately a 3 cm linear incision was made into the metatarsal cuneiform region. Dissection was meticulously carried down into the subcutaneous and deep fascial layers. The deep peroneal nerve and branches were properly identified and retracted from the surgical site. Proper protection was made not to interfere with the extensor hallucis longus muscle and tendinous structures as well as the EHB tendon as well in the region. The dorsal neurovascular structures were also properly identified and retracted laterally from the surgical site. At this time a deep fascial incision was made to gain access to the first and second metatarsal bases as well as medial and middle cuneiform bones. With use of the [employer] Lisfranc dorsal fracture plate we were able to apply overlying the medial cuneiform delayed union site. At this time with fluoroscopic imaging we were able to apply the locking screws to adhere the plate to the underlying bony surfaces. The screw site into the second metatarsal base was oblong and with proper compression it did reduce the Lisfranc joint region base second and first metatarsal cuneiform area. Plate and screws were applied per product guidelines. All screws were noted to engage the dorsal fracture plate appropriately. Fluoroscopic imaging was obtained throughout the entire procedure which did note adequate placement and length of screws being placed. Surgical site was properly flushed with copious amounts of saline. Closure was then obtained subcutaneously with 4-0 Vicryl in an interrupted fashion. Epidermal closure was then performed with 4-0 nylon in a continuous interlocking fashion.   We did utilize a total of 10 cc of 0.5% Marcaine plain to anesthetize surgical site right foot. Tourniquet was released with total tourniquet time of 49 minutes noted, good vascularity was reestablished to the right foot and digital regions. Betadine soaked Adaptic and dry padded dressing was then applied right lower extremity. The patient tolerated the procedure and anesthesia well and left the operating room in stable condition. The patient is being transported to the recovery room for post operative management. Patient and/or caregiver was given postoperative home-going instructions and prescriptions to be taken as directed. Patient and/or caregiver were advised to call the office with any questions or concerns prior to their scheduled postoperative appointment.         Electronically signed by Edilia Camejo DPM on 3/17/2022 at 11:41 AM

## 2022-03-17 NOTE — ANESTHESIA POSTPROCEDURE EVALUATION
Department of Anesthesiology  Postprocedure Note    Patient: Ayo Bhatti  MRN: [id number]  YOB: 2000  Date of evaluation: 3/17/2022  Time:  12:13 PM     Procedure Summary     Date: 03/17/22 Room / Location: [address] [place] [address] 03 / [facility]    Anesthesia Start: 1019 Anesthesia Stop: 4010    Procedure: OPEN TREATMENT MEDIAL CUNEIFORM FRACTURE RIGHT FOOT LISFRANC REPAIR RIGHT FOOT (Right Foot) Diagnosis: (closed, displaced fracture medial cuneiform right foot with delayed healing-S92.241G, Lisfranc sprain right foot-S93.621A, pain in right foot-M79.671)    Surgeons: Josue Raymond, LONNYM Responsible Provider: Joe Koch MD    Anesthesia Type: general ASA Status: 3          Anesthesia Type: general    Dillon Phase I: Dillon Score: 10    Dillon Phase II: Dillon Score: 10    Last vitals: Reviewed and per EMR flowsheets.        Anesthesia Post Evaluation    Patient location during evaluation: PACU  Patient participation: complete - patient participated  Level of consciousness: awake  Airway patency: patent  Nausea & Vomiting: no nausea and no vomiting  Complications: no  Cardiovascular status: hemodynamically stable  Respiratory status: room air and spontaneous ventilation  Hydration status: stable

## 2022-03-17 NOTE — H&P
Update History & Physical     The patient's History and Physical this morning was reviewed with the patient and there were no significant changes. I examined the patient and there were no significant changes from the previous History and Physical.     Plan: The risk, benefits, expected outcome, and alternative to the recommended procedure have been discussed with the patient. Patient understands and wants to proceed with the procedure. The procedure discussed is 1. Open repair medial cuneiform fracture/lisfranc repair right foot.          Electronically signed by Abimael Carcamo DPM on 3/17/2022 at 10:11 AM

## 2022-03-22 ENCOUNTER — OFFICE VISIT (OUTPATIENT)
Dept: PODIATRY | Age: 22
End: 2022-03-22

## 2022-03-22 VITALS
DIASTOLIC BLOOD PRESSURE: 84 MMHG | BODY MASS INDEX: 45.24 KG/M2 | HEART RATE: 103 BPM | WEIGHT: 265 LBS | HEIGHT: 64 IN | SYSTOLIC BLOOD PRESSURE: 140 MMHG

## 2022-03-22 DIAGNOSIS — S93.621A LISFRANC'S SPRAIN, RIGHT, INITIAL ENCOUNTER: ICD-10-CM

## 2022-03-22 DIAGNOSIS — S92.241G: Primary | ICD-10-CM

## 2022-03-22 PROCEDURE — 99024 POSTOP FOLLOW-UP VISIT: CPT | Performed by: PODIATRIST

## 2022-03-22 NOTE — PROGRESS NOTES
Patient is in today for post op of right foot. Patient is not having much pain.  pcp is Jj Cali MD  Last ov 3/3/22

## 2022-03-23 NOTE — PROGRESS NOTES
3/22/22     Elbert Chacko    : 2000   Sex: female    Age: 24 y.o. Patient's PCP/Provider is:  Faye Woodson MD    Subjective:  Patient is seen today for follow-up regarding continued postoperative management fracture repair right foot. Overall patient is doing well postoperatively without any additional issues noted. Patient has been wearing her cam walker as instructed with limited weightbearing as recommended. Patient denies any nausea, vomiting, fever, chills. No other additional abnormalities noted. Chief Complaint   Patient presents with    Post-Op Check     right foot        ROS:  Const: Positives and pertinent negatives as per HPI. Musculo: Denies symptoms other than stated above. Neuro: Denies symptoms other than stated above. Skin: Denies symptoms other than stated above. Current Medications:    Current Outpatient Medications:     HYDROcodone-acetaminophen (NORCO) 5-325 MG per tablet, Take 1 tablet by mouth every 6 hours as needed for Pain for up to 7 days. Intended supply: 7 days. Take lowest dose possible to manage pain, Disp: 28 tablet, Rfl: 0    buPROPion (WELLBUTRIN SR) 100 MG extended release tablet, Take 1 tablet by mouth 2 times daily TAKE 1 TABLET NIGHTLY FOR 3 DAYS AND THEN CONTINUE 2 TIMES A DAY., Disp: 60 tablet, Rfl: 3    vitamin D (ERGOCALCIFEROL) 1.25 MG (36671 UT) CAPS capsule, Take 1 capsule by mouth once a week (Patient taking differently: Take 50,000 Units by mouth once a week Thursday), Disp: 12 capsule, Rfl: 1    Allergies: Allergies   Allergen Reactions    Aspirin      Has ITP- blood clotting disorder; causes hemmorhage       Vitals:    22 1355   BP: (!) 140/84   Pulse: 103   Weight: 265 lb (120.2 kg)   Height: 5' 4\" (1.626 m)       Exam:  Neurovascular status intact right foot. Surgical site well coapted with sutures intact right foot. No signs of infection noted surgical site right foot.   Good range of motion digital regions right foot.    Diagnostic Studies:     XR FOOT RIGHT (MIN 3 VIEWS)    Result Date: 2/23/2022  EXAMINATION: THREE XRAY VIEWS OF THE RIGHT FOOT 2/23/2022 3:25 pm COMPARISON: Right foot series from January 6, 2022 HISTORY: ORDERING SYSTEM PROVIDED HISTORY: Right foot pain FINDINGS: Heterogeneous sclerotic changes to the medial cuneiform bone suggestive of osseous healing related to prior injury. Slight widening of the 1st and 2nd proximal intermetatarsal space is stable compared multiple prior exams. Overall alignment is anatomic. No acute fractures. Osseous mineralization is normal.  No significant degenerative changes. No erosive changes. 1.  Sequela of prior trauma in the vicinity of the medial cuneiform bone. Slight widening of the 1st and 2nd proximal intermetatarsal space is stable compared to multiple prior exams. 2.  No additional osseous changes on this exam.     FLUORO FOR SURGICAL PROCEDURES    Result Date: 3/17/2022  Radiology exam is complete. No Radiologist dictation. Please follow up with ordering provider. Procedures:    None    Plan Per Assessment  Jamel Au was seen today for post-op check. Diagnoses and all orders for this visit:    Closed displaced fracture of medial cuneiform of right foot with delayed healing    Lisfranc's sprain, right, initial encounter      1. Postoperative evaluation and management  2. Surgical site evaluated right foot. Dressing reapplied right foot. 3. Patient was continued partial weightbearing right foot with Cam walker intact. Was to continue icing and elevating right lower extremity throughout the day. 4. Patient will be followed up in 1 week's time for continued postoperative management and care. Seen By:    Irvin Gustafson DPM    Electronically signed by Irvin Gustafson DPM on 3/22/2022 at 8:53 PM    This note was created using voice recognition software. The note was reviewed however may contain grammatical errors.

## 2022-03-31 ENCOUNTER — OFFICE VISIT (OUTPATIENT)
Dept: PODIATRY | Age: 22
End: 2022-03-31

## 2022-03-31 VITALS — WEIGHT: 265 LBS | HEIGHT: 64 IN | BODY MASS INDEX: 45.24 KG/M2

## 2022-03-31 DIAGNOSIS — S92.241G: Primary | ICD-10-CM

## 2022-03-31 DIAGNOSIS — S93.621A LISFRANC'S SPRAIN, RIGHT, INITIAL ENCOUNTER: ICD-10-CM

## 2022-03-31 PROCEDURE — 99024 POSTOP FOLLOW-UP VISIT: CPT | Performed by: PODIATRIST

## 2022-03-31 NOTE — PROGRESS NOTES
Patient is in today for 1 week right foot.  Patient is doing very well since sx. pcp is Niurka Dejesus MD  Last ov 3/3/22

## 2022-03-31 NOTE — PROGRESS NOTES
3/31/22     Nathan Treviño    : 2000   Sex: female    Age: 24 y.o. Patient's PCP/Provider is:  Paul Rosenberg MD    Subjective:  Patient is seen today for follow-up regarding continued postoperative management fracture repair right foot. Overall patient is doing great at this time with minimal issues noted. She denies any nausea, vomiting, fever, chills. No other additional abnormalities noted. Chief Complaint   Patient presents with    Post-Op Check     right foot        ROS:  Const: Positives and pertinent negatives as per HPI. Musculo: Denies symptoms other than stated above. Neuro: Denies symptoms other than stated above. Skin: Denies symptoms other than stated above. Current Medications:    Current Outpatient Medications:     buPROPion (WELLBUTRIN SR) 100 MG extended release tablet, Take 1 tablet by mouth 2 times daily TAKE 1 TABLET NIGHTLY FOR 3 DAYS AND THEN CONTINUE 2 TIMES A DAY., Disp: 60 tablet, Rfl: 3    vitamin D (ERGOCALCIFEROL) 1.25 MG (06948 UT) CAPS capsule, Take 1 capsule by mouth once a week (Patient taking differently: Take 50,000 Units by mouth once a week Thursday), Disp: 12 capsule, Rfl: 1    Allergies: Allergies   Allergen Reactions    Aspirin      Has ITP- blood clotting disorder; causes hemmorhage       Vitals:    22 1555   Weight: 265 lb (120.2 kg)   Height: 5' 4\" (1.626 m)       Exam:  VASCULAR: Pulses palpable right foot. Capillary refill time brisk digits 1 through 5 right foot. NEUROLOGICAL: Epicritic sensations intact with mild paresthesias noted into the right first and second digital regions. DERMATOLOGICAL: Incision site well coapted with sutures intact. No signs of infection noted right foot  MUSCULOSKELETAL: Adequate range of motion digital regions right foot. Minimal tenderness noted along the surgical site right foot. Diagnostic Studies:     FLUORO FOR SURGICAL PROCEDURES    Result Date: 3/17/2022  Radiology exam is complete.  No

## 2022-04-13 ENCOUNTER — OFFICE VISIT (OUTPATIENT)
Dept: PODIATRY | Age: 22
End: 2022-04-13

## 2022-04-13 VITALS — HEIGHT: 64 IN | BODY MASS INDEX: 45.41 KG/M2 | WEIGHT: 266 LBS

## 2022-04-13 DIAGNOSIS — S93.621A LISFRANC'S SPRAIN, RIGHT, INITIAL ENCOUNTER: ICD-10-CM

## 2022-04-13 DIAGNOSIS — S92.241G: Primary | ICD-10-CM

## 2022-04-13 PROCEDURE — 99024 POSTOP FOLLOW-UP VISIT: CPT | Performed by: PODIATRIST

## 2022-04-13 NOTE — PROGRESS NOTES
Patient is in today for post op of right foot. Patient says she is doing very well.  pcp is Dinorah Restrepo MD  Last ov 3/3/22

## 2022-04-14 NOTE — PROGRESS NOTES
22     Karyn Parikh    : 2000   Sex: female    Age: 24 y.o. Patient's PCP/Provider is:  Juani Roberts MD    Subjective:  Patient is seen today for follow-up regarding continued evaluation regarding right foot ORIF. Overall patient is doing great at this time with minimal issues noted. She has been wearing the cam walker as instructed. She is very pleased with surgical outcome at this point in time. Chief Complaint   Patient presents with    Post-Op Check     right foot        ROS:  Const: Positives and pertinent negatives as per HPI. Musculo: Denies symptoms other than stated above. Neuro: Denies symptoms other than stated above. Skin: Denies symptoms other than stated above. Current Medications:    Current Outpatient Medications:     buPROPion (WELLBUTRIN SR) 100 MG extended release tablet, Take 1 tablet by mouth 2 times daily TAKE 1 TABLET NIGHTLY FOR 3 DAYS AND THEN CONTINUE 2 TIMES A DAY., Disp: 60 tablet, Rfl: 3    vitamin D (ERGOCALCIFEROL) 1.25 MG (15088 UT) CAPS capsule, Take 1 capsule by mouth once a week (Patient taking differently: Take 50,000 Units by mouth once a week Thursday), Disp: 12 capsule, Rfl: 1    Allergies: Allergies   Allergen Reactions    Aspirin      Has ITP- blood clotting disorder; causes hemmorhage       Vitals:    22 1604   Weight: 266 lb (120.7 kg)   Height: 5' 4\" (1.626 m)       Exam:  Neurovascular status unchanged. Surgical site healed foot. Minimal edema noted right dorsal midfoot region. Adequate range of motion noted right ankle and subtalar joint. Minimal tenderness noted at the surgical repair site right foot. Stable gait noted upon evaluation      Diagnostic Studies:     FLUORO FOR SURGICAL PROCEDURES    Result Date: 3/17/2022  Radiology exam is complete. No Radiologist dictation. Please follow up with ordering provider. Procedures:    None    Plan Per Assessment  Gorman Ganser was seen today for post-op check.     Diagnoses and all orders for this visit:    Closed displaced fracture of medial cuneiform of right foot with delayed healing    Lisfranc's sprain, right, initial encounter      1. Postoperative evaluation and management  2. Surgical site evaluated right foot. Patient was advised continued use of her good supportive shoes and compression stockings. 3. Patient is to gradually addition into her good supportive shoe gear with continued use of Cam walker over the next 7 to 10 days. Patient is to increase activities to tolerance. 4. Patient will be followed up at a later date for continued evaluation and management. Seen By:    Dorice Boas, DPM    Electronically signed by Dorice Boas, DPM on 4/13/2022 at 9:16 PM    This note was created using voice recognition software. The note was reviewed however may contain grammatical errors.

## 2022-04-27 ENCOUNTER — OFFICE VISIT (OUTPATIENT)
Dept: PODIATRY | Age: 22
End: 2022-04-27

## 2022-04-27 VITALS — WEIGHT: 266 LBS | HEIGHT: 64 IN | BODY MASS INDEX: 45.41 KG/M2

## 2022-04-27 DIAGNOSIS — S93.621A LISFRANC'S SPRAIN, RIGHT, INITIAL ENCOUNTER: Primary | ICD-10-CM

## 2022-04-27 DIAGNOSIS — S92.241G: ICD-10-CM

## 2022-04-27 PROCEDURE — 99024 POSTOP FOLLOW-UP VISIT: CPT | Performed by: PODIATRIST

## 2022-04-27 NOTE — PROGRESS NOTES
Patient is in today for post op of right foot. Patient is still having walking and bending of foot issues. Patient says she is unable to bend her foot and walk normally.  pcp is uJani Roberts MD  Last ov 3/3/22

## 2022-04-27 NOTE — PROGRESS NOTES
22     Birtha Fausto    : 2000   Sex: female    Age: 24 y.o. Patient's PCP/Provider is:  Jake John MD    Subjective:  Patient is seen today for follow-up regarding continued postoperative management Lisfranc repair right foot. Overall patient is doing great at this time with minimal pain at the surgical site right foot. Patient still having some issues with certain gait abnormalities and tenderness into the heel and ankle region. Patient wanted to discuss other potential treatment options available at this time. No other additional abnormalities noted. Chief Complaint   Patient presents with    Post-Op Check     right foot        ROS:  Const: Positives and pertinent negatives as per HPI. Musculo: Denies symptoms other than stated above. Neuro: Denies symptoms other than stated above. Skin: Denies symptoms other than stated above. Current Medications:    Current Outpatient Medications:     buPROPion (WELLBUTRIN SR) 100 MG extended release tablet, Take 1 tablet by mouth 2 times daily TAKE 1 TABLET NIGHTLY FOR 3 DAYS AND THEN CONTINUE 2 TIMES A DAY., Disp: 60 tablet, Rfl: 3    vitamin D (ERGOCALCIFEROL) 1.25 MG (70486 UT) CAPS capsule, Take 1 capsule by mouth once a week (Patient taking differently: Take 50,000 Units by mouth once a week Thursday), Disp: 12 capsule, Rfl: 1    Allergies: Allergies   Allergen Reactions    Aspirin      Has ITP- blood clotting disorder; causes hemmorhage       Vitals:    22 1500   Weight: 266 lb (120.7 kg)   Height: 5' 4\" (1.626 m)       Exam:  Neurovascular status unchanged. Surgical site stable right midfoot. Adequate range of motion noted right ankle and digital regions. Stable gait noted upon evaluation. Mild tenderness noted into the hindfoot and ankle region with range of motion ankle and subtalar joint.       Diagnostic Studies:     XR FOOT RIGHT (MIN 3 VIEWS)    Result Date: 2022  EXAMINATION: THREE XRAY VIEWS OF THE RIGHT FOOT 4/13/2022 2:50 pm COMPARISON: 23 February 2022 HISTORY: ORDERING SYSTEM PROVIDED HISTORY: Closed displaced fracture of medial cuneiform of right foot with delayed healing FINDINGS: Intact fusion hardware at the 1st and 2nd tarsometatarsal joints and at the articulation of the medial and middle cuneiform is a. normal alignment. No new bone or soft tissue findings. Intact fusion hardware with normal alignment as noted. Procedures:    None    Plan Per Assessment  Inocencia Saul was seen today for post-op check. Diagnoses and all orders for this visit:    Lisfranc's sprain, right, initial encounter    Closed displaced fracture of medial cuneiform of right foot with delayed healing      1. Postoperative evaluation and management  2. We did discuss getting patient set up for physical therapy to improve her gait and range of motion right lower extremity. 3. [facility]. 4. Patient will be followed up in 3 weeks time or sooner if needed for reevaluation. She was advised to call the office with any questions or concerns in the interim. Seen By:    Pavel Garrido DPM    Electronically signed by Pavel Garrido DPM on 4/27/2022 at 3:08 PM    This note was created using voice recognition software. The note was reviewed however may contain grammatical errors.

## 2022-05-04 ENCOUNTER — EVALUATION (OUTPATIENT)
Dept: PHYSICAL THERAPY | Age: 22
End: 2022-05-04
Payer: COMMERCIAL

## 2022-05-04 DIAGNOSIS — S92.241G: Primary | ICD-10-CM

## 2022-05-04 DIAGNOSIS — S93.621A LISFRANC'S SPRAIN, RIGHT, INITIAL ENCOUNTER: ICD-10-CM

## 2022-05-04 PROCEDURE — 97163 PT EVAL HIGH COMPLEX 45 MIN: CPT | Performed by: PHYSICAL THERAPIST

## 2022-05-04 NOTE — PROGRESS NOTES
[facility] OUTPATIENT REHABILITATION  PHYSICAL THERAPY INITIAL EVALUATION         Date:  2022   Patient: Kena Bynum  : 2000  MRN: [id number]  Referring Provider: Sarath Nichols DPM  [facility],  [facility]     Medical Diagnosis:   V75.874P (ICD-10-CM) - Lisfranc's sprain, right, initial encounter   S92.241G (ICD-10-CM) - Closed displaced fracture of medial cuneiform of right foot with delayed healing     Physician Order: Lee Ann Christine and Treat     SUBJECTIVE:     Onset date: 10/13/2021    Onset: Sudden     History / Mechanism of Injury: stepping down 1 step tripped up with left foot and put right foot into a very awkward position. Closed, displaced fracture medial cuneiform right foot with delayed healing-S92.241G  2. Lisfranc sprain right foot-S93.621A  3. Pain in right foot-M79.671  3/17/2022 - OPEN TREATMENT MEDIAL CUNEIFORM FRACTURE RIGHT FOOT LISFRANC REPAIR RIGHT FOOT    Interventions for current problem: NWB initially, had     Chief complaint: pain and not walking right    Behavior: condition is staying the same    Pain: intermittent  Current: 5/10     Best: 0/10     Worst:8/10 (occurs with walking). Pain returns to baseline in an hour    Symptom Type / Quality: sharp  Location[de-identified] Ankle: base of 1st and 2nd metatarsals radiates into 1st interspace and 1st toe    Aggravated by: pushing off when walking    Relieved by: rest, reduced weightbearing    Imaging results: XR FOOT RIGHT (MIN 3 VIEWS)    Result Date: 2022  EXAMINATION: THREE XRAY VIEWS OF THE RIGHT FOOT 2022 2:50 pm COMPARISON: 2022 HISTORY: ORDERING SYSTEM PROVIDED HISTORY: Closed displaced fracture of medial cuneiform of right foot with delayed healing FINDINGS: Intact fusion hardware at the 1st and 2nd tarsometatarsal joints and at the articulation of the medial and middle cuneiform is a. normal alignment. No new bone or soft tissue findings.      Intact fusion hardware with normal alignment as noted. Past Medical History:  Past Medical History:   Diagnosis Date    Autoimmune disorder ([facility]     ITP- blood clotting disorder    BMI 45.0-49.9, adult ([facility] 2020    Recommended consideration of growth at 32 weeks and weekly  testing per COVID 19 recommendations     Bronchitis      delivery delivered 7/15/2020    Closed displaced fracture of medial cuneiform of right foot with delayed healing 2022    Diabetes mellitus ([facility]     borderline - diet control    Disease of blood and blood forming organ     blood clotting issue    History of  delivery affecting pregnancy 2020    Hypertension     diet control;  with pregnancy only     Past Surgical History:   Procedure Laterality Date    ARM SURGERY Left      SECTION      2 - c-sections     SECTION N/A 7/15/2020    REPEAT  SECTION performed by Susan Lowry MD at [facility] L&D OR    FOOT SURGERY Right 3/17/2022    OPEN TREATMENT MEDIAL CUNEIFORM FRACTURE RIGHT FOOT LISFRANC REPAIR RIGHT FOOT performed by Pavel Mccrary DPM at [address] [place] [address] [facility]       Medications:   Current Outpatient Medications   Medication Sig Dispense Refill    buPROPion (WELLBUTRIN SR) 100 MG extended release tablet Take 1 tablet by mouth 2 times daily TAKE 1 TABLET NIGHTLY FOR 3 DAYS AND THEN CONTINUE 2 TIMES A DAY. 60 tablet 3    vitamin D (ERGOCALCIFEROL) 1.25 MG (47777 UT) CAPS capsule Take 1 capsule by mouth once a week (Patient taking differently: Take 50,000 Units by mouth once a week Thursday) 12 capsule 1     No current facility-administered medications for this visit. Occupation: does not work. Exercise regimen: walking    Hobbies: none    Patient Goals: pain relief, walk normally    Precautions/Contraindications: recent surgery    OBJECTIVE:     Estimated body mass index is 45.66 kg/m² as calculated from the following:    Height as of 22: 5' 4\" (1.626 m). Weight as of 4/27/22: 266 lb (120.7 kg). Observations: well nourished female, normal affect    Inspection: normal orthopedic exam    Edema: mild forefoot    Gait: antalgic gait, walks on lateral side of foot with no pain, when trying to walk normal heel/toe gait pain is 7/10 at base of 1st metatarsal    Joint/Motion:    Ankle:  Right:   AROM: 8° Dorsiflexion,  45° Plantarflexion, 25° Inversion, 10° Eversion   PROM: 10° Dorsiflexion,  50° Plantarflexion, 30° Inversion, 15° Eversion   Left:   AROM: 15° Dorsiflexion,  45° Plantarflexion, 25° Inversion, 10° Eversion  PROM: 18° Dorsiflexion,  50° Plantarflexion, 30° Inversion, 15° Eversion    Strength: Ankle:  Right: Dorsiflexion 5/5, Plantarflexion 5/5, Inversion 5/5, Eversion 5/5  Left: Dorsiflexion 5/5, Plantarflexion 5/5, Inversion 5/5, Eversion 5/5    Palpation: marked pain with ventral to dorsal pressure placed on 1st metatarsal head decreasing pain noted as this force is placed on 2nd, 3rd, met heads. No pain w/ pressure on 4th or 5th met heads    Sensation: decreased in interspace between toes 1 and 2, indicating deep peroneal nerve irritation     Special Tests/Functional Screens:    [x] Anterior Drawer []+ / [x] -  [x] Eversion Stress: []+ / [x] -  [] Madrigal Test []+ / [] -     [] Tib-Fib Compression Test []+ / [] -    [x] Inversion Stress []+ / [x] -     [] Squeeze Test []+ / [] -   [x] Karri's Sign []+ / [x] -   [] Other: []+ / []      Special test comments:       ASSESSMENT     Mary Glaser presents w/ marked pain on R foot medial side weightbearing in the met heads 1 and 2. She is ambulates on lateal side of foot w/ no pain.  Upon attempting normal gait she has marked pain in      Outcome Measure:   Lower Extremity Functional Scale (LEFS) 45% impairment    Problems:   Pain 8/10 intermittent  Limitations with walking, use of right lower extremity, limited tolerance to weightbearing tasks and weightbearing duration   Decreased dorsiflexion      [x] There are no barriers affecting plan of care or recovery    [] Barriers to this patient's plan of care or recovery include:     Domestic Concerns:  [x] No  [] Yes:    Short Term goals (2-3 weeks)   Pain 3/10 with normal gait    Long Term goals (4-6 weeks)   Pain 1-2/10 infrequent   ROM WNL and symmetrical   Able to perform / complete the following functions / tasks: normal gait   LEFS 18% impairment   Independent with home exercise program (HEP)    Rehab Potential: [x] Good  [] Fair  [] Poor    PLAN     Time:  1166-0743     40 minutes    Physical therapy plan of care is established based on physician order, patient diagnosis, and clinical assessment. Treatment Plan:   instruction in home exercise program   therapeutic exercise   therapeutic activity   manual therapy   cold / hot pack  electrical stimulation     The following CPT codes are likely to be used in the care of this patient:   62347 PT Evaluation: High Complexity   43747 Therapeutic Exercise   36089 Therapeutic Activities   30079 Manual Therapy    Electrical Stimulation    Suggested Professional Referral: [x] No  [] Yes:     Patient Education:  [x] Plans / Goals, Risks / Benefits discussed  [x] Home exercise program  Method of Education: [x] Verbal  [x] Demo  [x] Written  Comprehension of Education:  [x] Verbalizes understanding. [x] Demonstrates understanding. [] Needs Review. [] Demonstrates / verbalizes understanding of HEP/Ed previously given. Frequency: 1-2 days per week for 4-6 weeks    Patient understands diagnosis/prognosis and consents to treatment, plan and goals: [x] Yes    [] No     Thank you for the opportunity to work with your patient. If you have questions or comments, please contact me at [phone]; fax: [phone]. Electronically signed by: Olga Pang PT         Please sign Physician's Certification and return to:   [facility] [address] [place] [address] PHYSICAL THERAPY  [facility] [address] Gael Lopez  Dept: [phone]  Dept Fax: [phone]: [phone] Certification / Comments     Frequency/Duration 1-2 days per week for 4-6 weeks. Certification period from 5/4/2022 to 7/25/2022. I have reviewed the Plan of Care established for skilled therapy services and certify that the services are required and that they will be provided while the patient is under my care.     Physician's Comments/Revisions:               Physician's Printed Name:                                           [de-identified] Signature:                                                               Date:

## 2022-05-09 ENCOUNTER — TREATMENT (OUTPATIENT)
Dept: PHYSICAL THERAPY | Age: 22
End: 2022-05-09
Payer: COMMERCIAL

## 2022-05-09 DIAGNOSIS — S92.241G: Primary | ICD-10-CM

## 2022-05-09 DIAGNOSIS — S93.621A LISFRANC'S SPRAIN, RIGHT, INITIAL ENCOUNTER: ICD-10-CM

## 2022-05-09 PROCEDURE — 97110 THERAPEUTIC EXERCISES: CPT

## 2022-05-09 NOTE — PROGRESS NOTES
Physical Therapy Daily Treatment Note    Date: 2022  Patient Name: Tj Sanchez  : 2000   MRN: [id number]  DOInjury: 10/13/2021  DOSx: 3/17/2022  Referring Provider: Sorin Gutierrez, LONNY  [address]  [place] [facility] Diagnosis:      Diagnosis Orders   1. Closed displaced fracture of medial cuneiform of right foot with delayed healing     2. Lisfranc's sprain, right, initial encounter         Outcome Measure:   Lower Extremity Functional Scale (LEFS) 45% impairment    X = TO BE PERFORMED NEXT VISIT  > = PROGRESS TO THIS    Access Code: [email or web address]  URL: [email or web address]  Date: 2022  Prepared by: Gadiel Gray    Exercises  Seated Ankle Alphabet - 3 x daily - 3 sets - 30s hold  Long Sitting Calf Stretch with Strap - 3 x daily - 3 sets - 30s hold  Long Sitting Soleus Stretch on Bolster with Strap - 3 x daily - 3 sets - 30s hold  Ankle Dorsiflexion with Resistance - 1 x daily - 3 sets - 10 reps  Ankle Inversion with Resistance - 1 x daily - 3 sets - 10 reps  Seated Toe Raise - 1 x daily - 3 sets - 10 reps  Seated Heel Raise - 1 x daily - 3 sets - 10 reps    S: Pt reports 3-5 /10 pain today. Still unable to put weight through 1st 2nd toe without a lot of pain  O: Discussed anatomy, physiology, body mechanics, principles of loading, and progressive loading/activity. Reviewed home exercise program extensively; written copy provided. Time 1300-     Visit Eval+ 1/ Repeat outcome measure at mid point and end.     Pain Pain at rest 4/10  Pain with activity 5/10     ROM      Modalities         MO   Manual            Stretch      Towel / strap DF, INV 3 x 30 sec   TE      TE   Exercise      NuStep L5 X 15 min     BAPS L2 2 x 20   F/B., L/R, CW/CCW Standing PWB    Resisted DF Green x 20     Resisted INV Green x 20           Ball / can rolling   TE   Toe curls      Heel slides   TE   QS   TE   SLR   TE   SAQ   TE   [] TG  [] Leg Press 2-leg   TE   [] TG  [] Leg Press 1-leg   TE   Hamstring Curl Machine   TE   Knee Extension Machine   TE   Step-ups - FWD   TA   Step-ups - LAT   TA   Step-ups - BWD   TA   Calf Raises   TA      TA      TA               A:  Tolerated well. Attempted resisted EV and DF but both increased her pain. Will try on another date.  Given green tubing and written HEP   P: Continue with rehab plan  Anuja Berg PTA    Treatment Charges: Mins Units   Initial Evaluation     Re-Evaluation     Ther Exercise         TE 40 3   Manual Therapy     MT     Ther Activities        TA     Gait Training          GT     Neuro Re-education NR     Modalities     Non-Billable Service Time     Other     Total Time/Units 40 3

## 2022-05-13 ENCOUNTER — TREATMENT (OUTPATIENT)
Dept: PHYSICAL THERAPY | Age: 22
End: 2022-05-13
Payer: COMMERCIAL

## 2022-05-13 DIAGNOSIS — S93.621A LISFRANC'S SPRAIN, RIGHT, INITIAL ENCOUNTER: ICD-10-CM

## 2022-05-13 DIAGNOSIS — S92.241G: Primary | ICD-10-CM

## 2022-05-13 PROCEDURE — 97530 THERAPEUTIC ACTIVITIES: CPT

## 2022-05-13 PROCEDURE — 97110 THERAPEUTIC EXERCISES: CPT

## 2022-05-13 NOTE — PROGRESS NOTES
Physical Therapy Daily Treatment Note    Date: 2022  Patient Name: Karyn Parikh  : 2000   MRN: [id number]  [facility]  DOSx: 3/17/2022  Referring Provider: Vijaya Goss, AISSATOU  [address]  [facility]     Medical Diagnosis:      Diagnosis Orders   1. Closed displaced fracture of medial cuneiform of right foot with delayed healing     2. Lisfranc's sprain, right, initial encounter         Outcome Measure:   Lower Extremity Functional Scale (LEFS) 45% impairment    X = TO BE PERFORMED NEXT VISIT  > = PROGRESS TO THIS    Access Code: [email or web address]  URL: [email or web address]  Date: 2022  Prepared by: [place] Shock    Exercises  Seated Ankle Alphabet - 3 x daily - 3 sets - 30s hold  Long Sitting Calf Stretch with Strap - 3 x daily - 3 sets - 30s hold  Long Sitting Soleus Stretch on Bolster with Strap - 3 x daily - 3 sets - 30s hold  Ankle Dorsiflexion with Resistance - 1 x daily - 3 sets - 10 reps  Ankle Inversion with Resistance - 1 x daily - 3 sets - 10 reps  Seated Toe Raise - 1 x daily - 3 sets - 10 reps  Seated Heel Raise - 1 x daily - 3 sets - 10 reps    S: Pt reports feeling about the same as last rx session. O: Discussed anatomy, physiology, body mechanics, principles of loading, and progressive loading/activity. Reviewed home exercise program extensively; written copy provided. Time 1300-     Visit Eval+ 2 visit   32298 Therapeutic Exercise    24930 Therapeutic Activities    69851 Manual Therapy       Repeat outcome measure at mid point and end.     Pain Pain 4/10     ROM      Modalities         MO   Manual            Stretch      Towel / strap DF, INV 3 x 30 sec   TE      TE   Exercise      NuStep L5 X 15 min           BAPS L2 2 x 20   F/B., L/R, CW/CCW Standing PWB    Resisted DF Green x 20     Resisted INV Green x 20           Ball / can rolling   TE   Toe curls      Heel slides   TE   QS   TE   SLR   TE   SAQ   TE   [] TG  [x] Leg Press 2-leg 40# 3 x 15 new   TE   [] TG  [] Leg Press 1-leg   TE   Hamstring Curl Machine   TE   Knee Extension Machine   TE   Step-ups - FWD 7\" 3 x 10 new   TA   Step-ups - LAT 7\" 3 x 10 new   TA   Step-ups - BWD   TA   Calf Raises   TA      TA      TA               A:  Tolerated well. Pt able to tolerate the addition of newly added ex's .   Given green tubing and written HEP   P: Continue with rehab plan  Bobo Jones PTA    Treatment Charges: Mins Units   Initial Evaluation     Re-Evaluation     Ther Exercise         TE 30 2   Manual Therapy     MT     Ther Activities        TA 10 1   Gait Training          GT     Neuro Re-education NR     Modalities     Non-Billable Service Time     Other     Total Time/Units 40 3

## 2022-05-18 ENCOUNTER — TREATMENT (OUTPATIENT)
Dept: PHYSICAL THERAPY | Age: 22
End: 2022-05-18
Payer: COMMERCIAL

## 2022-05-18 ENCOUNTER — OFFICE VISIT (OUTPATIENT)
Dept: PODIATRY | Age: 22
End: 2022-05-18

## 2022-05-18 VITALS — BODY MASS INDEX: 44.39 KG/M2 | WEIGHT: 260 LBS | HEIGHT: 64 IN

## 2022-05-18 DIAGNOSIS — S93.621A LISFRANC'S SPRAIN, RIGHT, INITIAL ENCOUNTER: ICD-10-CM

## 2022-05-18 DIAGNOSIS — S92.241G: Primary | ICD-10-CM

## 2022-05-18 PROCEDURE — 97530 THERAPEUTIC ACTIVITIES: CPT

## 2022-05-18 PROCEDURE — 97110 THERAPEUTIC EXERCISES: CPT

## 2022-05-18 PROCEDURE — 99024 POSTOP FOLLOW-UP VISIT: CPT | Performed by: PODIATRIST

## 2022-05-18 NOTE — PROGRESS NOTES
Physical Therapy Daily Treatment Note    Date: 2022  Patient Name: Laura Lopez  : 2000   MRN: [id number]  DOInjury: 10/13/2021  DOSx: 3/17/2022  Referring Provider: Toya Hernandez, AISSATOU  [address]  [facility]     Medical Diagnosis:      Diagnosis Orders   1. Closed displaced fracture of medial cuneiform of right foot with delayed healing     2. Lisfranc's sprain, right, initial encounter         Outcome Measure:   Lower Extremity Functional Scale (LEFS) 45% impairment    X = TO BE PERFORMED NEXT VISIT  > = PROGRESS TO THIS    Access Code: [email or web address]  URL: [email or web address]  Date: 2022  Prepared by: Adelina Everett    Exercises  Seated Ankle Alphabet - 3 x daily - 3 sets - 30s hold  Long Sitting Calf Stretch with Strap - 3 x daily - 3 sets - 30s hold  Long Sitting Soleus Stretch on Bolster with Strap - 3 x daily - 3 sets - 30s hold  Ankle Dorsiflexion with Resistance - 1 x daily - 3 sets - 10 reps  Ankle Inversion with Resistance - 1 x daily - 3 sets - 10 reps  Seated Toe Raise - 1 x daily - 3 sets - 10 reps  Seated Heel Raise - 1 x daily - 3 sets - 10 reps    S: Pt reports improvement in ability to put pressure on base of great toe. O: Discussed anatomy, physiology, body mechanics, principles of loading, and progressive loading/activity. Reviewed home exercise program extensively; written copy provided. Time 2615-8115     Visit Eval+ 3 visit   45856 TE 36   61982 TA     41183 MT 0/36      Repeat outcome measure at mid point and end.     Pain Pain at rest 0/10  Pain with activity 7/10     ROM      Modalities         MO   Manual            Stretch      Towel / strap DF, INV    TE      TE   Exercise      NuStep L5 X 10 min           BAPS L2 2 x 20   F/B., L/R, CW/CCW Both feet on BAPS    Resisted DF     Resisted INV           Ball / can rolling   TE   Toe curls      Heel slides   TE   QS   TE   SLR   TE   SAQ   TE   [] TG  [x] Leg Press 2-leg 40# 3 x 15 TE   [] TG  [] Leg Press 1-leg   TE   Hamstring Curl Machine   TE   Knee Extension Machine   TE   Step-ups - FWD 7\" 2 x 15    TA   Step-ups - LAT 7\" 2 x 15  TA   Step-ups - BWD   TA   Calf Raises   TA    Mini Lunges 1 x 7 ea LE Increases pain to 7/10 during activity  but 0/10 immediately at rest. TA      TA               A:  Tolerated well. 7/10 Pain is reported at base of 1st metatarsal during FWB activities but subsides to 0/10 when resting.       P: Continue with rehab plan  Binta Graham PTA    Treatment Charges: Mins Units   Initial Evaluation     Re-Evaluation     Ther Exercise         TE 30 2   Manual Therapy     MT     Ther Activities        TA 10 1   Gait Training          GT     Neuro Re-education NR     Modalities     Non-Billable Service Time     Other     Total Time/Units 40 3

## 2022-05-18 NOTE — PROGRESS NOTES
Patient is in today for 3 week right foot post op. Patient says PT has been going well and she is doing good.  pcp is Odette Gannon MD  Last ov 3/3/22

## 2022-05-18 NOTE — PROGRESS NOTES
22     Bryson Jain    : 2000   Sex: female    Age: 89 [facility] y.o. Patient's PCP/Provider is:  Ildefonso Copeland MD    Subjective:  Patient is seen today for follow-up regarding continued evaluation regarding surgical repair right foot. Overall patient is doing great at this time with minimal issues noted. She is still undergoing physical therapy which is helping to reduce some symptoms into the right midfoot and arch area. Patient is back in her regular shoe gear and performing her daily activities with minimal issues. Chief Complaint   Patient presents with    Post-Op Check     right foot        ROS:  Const: Positives and pertinent negatives as per HPI. Musculo: Denies symptoms other than stated above. Neuro: Denies symptoms other than stated above. Skin: Denies symptoms other than stated above. Current Medications:    Current Outpatient Medications:     buPROPion (WELLBUTRIN SR) 100 MG extended release tablet, Take 1 tablet by mouth 2 times daily TAKE 1 TABLET NIGHTLY FOR 3 DAYS AND THEN CONTINUE 2 TIMES A DAY., Disp: 60 tablet, Rfl: 3    vitamin D (ERGOCALCIFEROL) 1.25 MG (97840 UT) CAPS capsule, Take 1 capsule by mouth once a week (Patient taking differently: Take 50,000 Units by mouth once a week Thursday), Disp: 12 capsule, Rfl: 1    Allergies: Allergies   Allergen Reactions    Aspirin      Has ITP- blood clotting disorder; causes hemmorhage       Vitals:    22 1443   Weight: 260 lb (117.9 kg)   Height: 5' 4\" (1.626 m)       Exam:  Neurovascular status grossly intact right foot. Increased range of motion noted right ankle and subtalar joint. Improved gait noted upon evaluation. No tenderness noted into the dorsal Lisfranc region right lower extremity. Diagnostic Studies:     No results found. Procedures:    None    Plan Per Assessment  Arlette Sanford was seen today for post-op check.     Diagnoses and all orders for this visit:    Closed displaced fracture of medial cuneiform of right foot with delayed healing    Lisfranc's sprain, right, initial encounter      1. Postoperative evaluation and management  2. Patient was advised continued PT sessions until finished within the next 2 weeks. 3. Patient was advised to continue with current shoe gear and daily stretching and strengthening exercises. 4. Patient will be followed up at a later date if any further Podiatric issues arise. Seen By:    Yoni Reardon DPM    Electronically signed by Yoni Reardon DPM on 5/18/2022 at 2:55 PM    This note was created using voice recognition software. The note was reviewed however may contain grammatical errors.

## 2022-05-23 ENCOUNTER — TREATMENT (OUTPATIENT)
Dept: PHYSICAL THERAPY | Age: 22
End: 2022-05-23
Payer: COMMERCIAL

## 2022-05-23 DIAGNOSIS — S93.621A LISFRANC'S SPRAIN, RIGHT, INITIAL ENCOUNTER: ICD-10-CM

## 2022-05-23 DIAGNOSIS — S92.241G: Primary | ICD-10-CM

## 2022-05-23 PROCEDURE — 97530 THERAPEUTIC ACTIVITIES: CPT

## 2022-05-23 PROCEDURE — 97110 THERAPEUTIC EXERCISES: CPT

## 2022-05-23 NOTE — PROGRESS NOTES
Physical Therapy Daily Treatment Note    Date: 2022  Patient Name: Kena Bynum  : 2000   MRN: [id number]  DOInjury: 10/13/2021  DOSx: 3/17/2022  Referring Provider: Sarath Nichols DPM  [address]  [facility]     Medical Diagnosis:      Diagnosis Orders   1. Closed displaced fracture of medial cuneiform of right foot with delayed healing     2. Lisfranc's sprain, right, initial encounter         Outcome Measure:   Lower Extremity Functional Scale (LEFS) 45% impairment    X = TO BE PERFORMED NEXT VISIT  > = PROGRESS TO THIS    Access Code: [email or web address]  URL: [email or web address]  Date: 2022  Prepared by: Binta Graham    Exercises  Seated Ankle Alphabet - 3 x daily - 3 sets - 30s hold  Long Sitting Calf Stretch with Strap - 3 x daily - 3 sets - 30s hold  Long Sitting Soleus Stretch on Bolster with Strap - 3 x daily - 3 sets - 30s hold  Ankle Dorsiflexion with Resistance - 1 x daily - 3 sets - 10 reps  Ankle Inversion with Resistance - 1 x daily - 3 sets - 10 reps  Seated Toe Raise - 1 x daily - 3 sets - 10 reps  Seated Heel Raise - 1 x daily - 3 sets - 10 reps    S: Pt reports improvement in ability to put pressure on base of great toe. O: Discussed anatomy, physiology, body mechanics, principles of loading, and progressive loading/activity. Reviewed home exercise program extensively; written copy provided. Time 7410-6803     Visit Eval+ 4 visit   33702 TE    75634 TA     [id number]       Repeat outcome measure at mid point and end.     Pain Pain at rest 0/10  Pain with activity 7/10     ROM      Modalities         MO   Manual            Stretch      Towel / strap DF, INV    TE      TE   Exercise      NuStep L5 X 10 min           BAPS L3 2 x 20   F/B., L/R, CW/CCW Both feet on BAPS    Resisted DF     Resisted INV           Ball / can rolling   TE   Toe curls      Heel slides   TE   QS   TE   SLR   TE   SAQ   TE   [] TG  [x] Leg Press 2-leg 40# 3 x 15 TE   [] TG  [] Leg Press 1-leg   TE   Hamstring Curl Machine   TE   Knee Extension Machine   TE   Step-ups - FWD 7\" 2 x 15    TA   Step-ups - LAT 7\" 2 x 15  TA   Step-ups - BWD   TA   Calf Raises   TA    Mini Lunges 1 x 10 ea LE Increases pain to 7/10 during activity  but 0/10 immediately at rest. TA   Marching with hold 2 x 45ft  TA               A:  Tolerated well.    P: Continue with rehab plan  Zheng Dwyer PTA    Treatment Charges: Mins Units   Initial Evaluation     Re-Evaluation     Ther Exercise         TE 30 2   Manual Therapy     MT     Ther Activities        TA 10 1   Gait Training          GT     Neuro Re-education NR     Modalities     Non-Billable Service Time     Other     Total Time/Units 40 3

## 2022-05-26 ENCOUNTER — TELEPHONE (OUTPATIENT)
Dept: PHYSICAL THERAPY | Age: 22
End: 2022-05-26

## 2022-05-26 NOTE — TELEPHONE ENCOUNTER
Date: 2022       Patient Name: Precious Zuleta  : 2000      MRN: [id number]    Patient cancelled appt today. Her child is sick.     Cypriot Loges, PTA

## 2022-06-24 ENCOUNTER — APPOINTMENT (OUTPATIENT)
Dept: CT IMAGING | Age: 22
End: 2022-06-24
Payer: COMMERCIAL

## 2022-06-24 ENCOUNTER — HOSPITAL ENCOUNTER (EMERGENCY)
Age: 22
Discharge: HOME | End: 2022-06-24
Payer: COMMERCIAL

## 2022-06-24 VITALS
SYSTOLIC BLOOD PRESSURE: 141 MMHG | OXYGEN SATURATION: 98 % | RESPIRATION RATE: 18 BRPM | DIASTOLIC BLOOD PRESSURE: 82 MMHG | TEMPERATURE: 97.8 F | HEART RATE: 90 BPM

## 2022-06-24 DIAGNOSIS — H81.10 BENIGN PAROXYSMAL POSITIONAL VERTIGO, UNSPECIFIED LATERALITY: Primary | ICD-10-CM

## 2022-06-24 LAB
ALBUMIN SERPL-MCNC: 4.5 G/DL (ref 3.5–5.2)
ALP BLD-CCNC: 124 U/L (ref 35–104)
ALT SERPL-CCNC: 22 U/L (ref 0–32)
ANION GAP SERPL CALCULATED.3IONS-SCNC: 10 MMOL/L (ref 7–16)
AST SERPL-CCNC: 18 U/L (ref 0–31)
BACTERIA: ABNORMAL /HPF
BASOPHILS ABSOLUTE: 0.05 E9/L (ref 0–0.2)
BASOPHILS RELATIVE PERCENT: 0.6 % (ref 0–2)
BILIRUB SERPL-MCNC: 0.4 MG/DL (ref 0–1.2)
BILIRUBIN URINE: NEGATIVE
BLOOD, URINE: NEGATIVE
BUN BLDV-MCNC: 11 MG/DL (ref 6–20)
CALCIUM SERPL-MCNC: 9.1 MG/DL (ref 8.6–10.2)
CHLORIDE BLD-SCNC: 100 MMOL/L (ref 98–107)
CLARITY: CLEAR
CO2: 25 MMOL/L (ref 22–29)
COLOR: YELLOW
CREAT SERPL-MCNC: 0.7 MG/DL (ref 0.5–1)
EOSINOPHILS ABSOLUTE: 0.32 E9/L (ref 0.05–0.5)
EOSINOPHILS RELATIVE PERCENT: 4.1 % (ref 0–6)
EPITHELIAL CELLS, UA: ABNORMAL /HPF
GFR AFRICAN AMERICAN: >60
GFR NON-AFRICAN AMERICAN: >60 ML/MIN/1.73
GLUCOSE BLD-MCNC: 90 MG/DL (ref 74–99)
GLUCOSE URINE: NEGATIVE MG/DL
HCG(URINE) PREGNANCY TEST: NEGATIVE
HCG, URINE, POC: NEGATIVE
HCT VFR BLD CALC: 40.8 % (ref 34–48)
HEMOGLOBIN: 13.4 G/DL (ref 11.5–15.5)
IMMATURE GRANULOCYTES #: 0.03 E9/L
IMMATURE GRANULOCYTES %: 0.4 % (ref 0–5)
KETONES, URINE: NEGATIVE MG/DL
LEUKOCYTE ESTERASE, URINE: ABNORMAL
LYMPHOCYTES ABSOLUTE: 1.94 E9/L (ref 1.5–4)
LYMPHOCYTES RELATIVE PERCENT: 24.9 % (ref 20–42)
Lab: NORMAL
MCH RBC QN AUTO: 26.7 PG (ref 26–35)
MCHC RBC AUTO-ENTMCNC: 32.8 % (ref 32–34.5)
MCV RBC AUTO: 81.4 FL (ref 80–99.9)
MONOCYTES ABSOLUTE: 0.46 E9/L (ref 0.1–0.95)
MONOCYTES RELATIVE PERCENT: 5.9 % (ref 2–12)
NEGATIVE QC PASS/FAIL: NORMAL
NEUTROPHILS ABSOLUTE: 4.98 E9/L (ref 1.8–7.3)
NEUTROPHILS RELATIVE PERCENT: 64.1 % (ref 43–80)
NITRITE, URINE: NEGATIVE
PDW BLD-RTO: 12.9 FL (ref 11.5–15)
PH UA: 7.5 (ref 5–9)
PLATELET # BLD: 108 E9/L (ref 130–450)
PMV BLD AUTO: 13.3 FL (ref 7–12)
POSITIVE QC PASS/FAIL: NORMAL
POTASSIUM REFLEX MAGNESIUM: 4 MMOL/L (ref 3.5–5)
PROTEIN UA: NEGATIVE MG/DL
RBC # BLD: 5.01 E12/L (ref 3.5–5.5)
RBC UA: ABNORMAL /HPF (ref 0–2)
SODIUM BLD-SCNC: 135 MMOL/L (ref 132–146)
SPECIFIC GRAVITY UA: 1.01 (ref 1–1.03)
TOTAL PROTEIN: 7 G/DL (ref 6.4–8.3)
UROBILINOGEN, URINE: 0.2 E.U./DL
WBC # BLD: 7.8 E9/L (ref 4.5–11.5)
WBC UA: ABNORMAL /HPF (ref 0–5)

## 2022-06-24 PROCEDURE — 87088 URINE BACTERIA CULTURE: CPT

## 2022-06-24 PROCEDURE — 81025 URINE PREGNANCY TEST: CPT

## 2022-06-24 PROCEDURE — 2580000003 HC RX 258: Performed by: PHYSICIAN ASSISTANT

## 2022-06-24 PROCEDURE — 85025 COMPLETE CBC W/AUTO DIFF WBC: CPT

## 2022-06-24 PROCEDURE — 80053 COMPREHEN METABOLIC PANEL: CPT

## 2022-06-24 PROCEDURE — 81001 URINALYSIS AUTO W/SCOPE: CPT

## 2022-06-24 PROCEDURE — 70450 CT HEAD/BRAIN W/O DYE: CPT

## 2022-06-24 PROCEDURE — 6370000000 HC RX 637 (ALT 250 FOR IP): Performed by: PHYSICIAN ASSISTANT

## 2022-06-24 PROCEDURE — 99284 EMERGENCY DEPT VISIT MOD MDM: CPT

## 2022-06-24 RX ORDER — MECLIZINE HYDROCHLORIDE 25 MG/1
25 TABLET ORAL 3 TIMES DAILY PRN
Qty: 21 TABLET | Refills: 0 | Status: SHIPPED | OUTPATIENT
Start: 2022-06-24 | End: 2022-07-01

## 2022-06-24 RX ORDER — 0.9 % SODIUM CHLORIDE 0.9 %
500 INTRAVENOUS SOLUTION INTRAVENOUS ONCE
Status: COMPLETED | OUTPATIENT
Start: 2022-06-24 | End: 2022-06-24

## 2022-06-24 RX ORDER — MECLIZINE HCL 12.5 MG/1
25 TABLET ORAL ONCE
Status: COMPLETED | OUTPATIENT
Start: 2022-06-24 | End: 2022-06-24

## 2022-06-24 RX ORDER — ONDANSETRON 4 MG/1
4 TABLET, ORALLY DISINTEGRATING ORAL ONCE
Status: COMPLETED | OUTPATIENT
Start: 2022-06-24 | End: 2022-06-24

## 2022-06-24 RX ADMIN — SODIUM CHLORIDE 500 ML: 9 INJECTION, SOLUTION INTRAVENOUS at 12:14

## 2022-06-24 RX ADMIN — MECLIZINE 25 MG: 12.5 TABLET ORAL at 11:54

## 2022-06-24 RX ADMIN — ONDANSETRON 4 MG: 4 TABLET, ORALLY DISINTEGRATING ORAL at 11:54

## 2022-06-24 ASSESSMENT — LIFESTYLE VARIABLES: HOW OFTEN DO YOU HAVE A DRINK CONTAINING ALCOHOL: NEVER

## 2022-06-24 NOTE — ED PROVIDER NOTES
[address]  Department of Emergency Medicine   ED  Encounter Note  Admit Date/RoomTime: 2022 11:33 AM  ED Room:   NAME: Chester Morgan  : 2000  MRN: [id number]     Chief Complaint:  Dizziness (began yesterday. Then started shaking and became nauseated. Thinks she may be dehydrated, states she doesnt drink as much water as she should. )    HISTORY OF PRESENT ILLNESS        Chester Morgan is a 24 y.o. female who presents to the ED with a complaint of dizziness and spinning. Patient thinks that she might be dehydrated. Does admit she has not been eating or drinking well for the past couple days. Plus she has had to be out in the hot weather. States yesterday she just felt lightheaded every time she would go to a standing position. Little bit of spinning and then everything would resolve quickly. Then this morning she rolled over in bed to look at her 1 toddler and everything started spinning. Had to get out of bed to take care of her kids and had another spinning event. Positive nausea, denies any vomiting. Patient states prior to this she has not been ill. Denies fevers or chills. Denies headache. Denies cough or chest congestion. Denies any vomiting or diarrhea. Denies any urinary symptoms. Patient does not think she is pregnant, but would like a pregnancy test.  Symptoms are moderate in severity, worse when she turns her head or goes to a standing position. ROS   Pertinent positives and negatives are stated within HPI, all other systems reviewed and are negative. Past Medical History:  has a past medical history of Autoimmune disorder ([address] BMI 45.0-49.9, adult ([address] Bronchitis,  delivery delivered, Closed displaced fracture of medial cuneiform of right foot with delayed healing, Diabetes mellitus ([address] Disease of blood and blood forming organ, History of  delivery affecting pregnancy, and Hypertension.     Surgical History:  has a past surgical history that includes  section; Arm Surgery (Left);  section (N/A, 7/15/2020); and Foot surgery (Right, 3/17/2022). Social History:  reports that she has been smoking. She has a 1.25 pack-year smoking history. She has never used smokeless tobacco. She reports previous drug use. Drug: Marijuana Ulen Zenon). She reports that she does not drink alcohol. Family History: family history includes Heart Disease in her maternal grandfather; Thyroid Disease in her mother. Allergies: Aspirin    PHYSICAL EXAM   Oxygen Saturation Interpretation: Normal on room air analysis. ED Triage Vitals [22 1110]   BP Temp Temp Source Heart Rate Resp SpO2 Height Weight   (!) 141/82 97.8 °F (36.6 °C) Oral 90 18 98 % -- --       General:  NAD. Alert and Oriented. Well-appearing. Skin:  Warm, dry. No rashes. Head:  Normocephalic. Atraumatic. Eyes:  EOMI. Conjunctiva normal.  Negative nystagmus. ENT:  Oral mucosa moist.  Airway patent. Bilateral TMs without erythema, without bulging. Neck:  Supple. Normal ROM. Respiratory:  No respiratory distress. No labored breathing. Lungs clear without rales, rhonchi or wheezing. Cardiovascular:  Regular rate. No Murmur. No peripheral edema. Extremities warm and good color. Chest:  Abdomen:  Soft, nondistended. Normal bowel sounds. Nontender to palpation all 4 quadrants. Negative rebound, negative guarding. Rectal:  Gu: Bladder nontender and non distended. No CVA tenderness. Pelvic:  Extremities:  Normal ROM. Nontender to palpation. Atraumatic. Back:  Normal ROM. Nontender to palpation. Neuro:  Alert and Oriented to person, place, time and situation. Normal LOC. Moves all extremities. Speech fluent. Patient able to sit up in bed without swaying. But does state the room spinning a little bit. Psych:  Calm and Cooperative. Normal thought process. Normal judgement.         Lab / Imaging Results   (All laboratory and radiology results have been personally reviewed by myself)  Labs:  Results for orders placed or performed during the hospital encounter of 06/24/22   CBC with Auto Differential   Result Value Ref Range    WBC 7.8 4.5 - 11.5 E9/L    RBC 5.01 3.50 - 5.50 E12/L    Hemoglobin 13.4 11.5 - 15.5 g/dL    Hematocrit 40.8 34.0 - 48.0 %    MCV 81.4 80.0 - 99.9 fL    MCH 26.7 26.0 - 35.0 pg    MCHC 32.8 32.0 - 34.5 %    RDW 12.9 11.5 - 15.0 fL    Platelets 916 (L) 757 - 450 E9/L    MPV 13.3 (H) 7.0 - 12.0 fL    Neutrophils % 64.1 43.0 - 80.0 %    Immature Granulocytes % 0.4 0.0 - 5.0 %    Lymphocytes % 24.9 20.0 - 42.0 %    Monocytes % 5.9 2.0 - 12.0 %    Eosinophils % 4.1 0.0 - 6.0 %    Basophils % 0.6 0.0 - 2.0 %    Neutrophils Absolute 4.98 1.80 - 7.30 E9/L    Immature Granulocytes # 0.03 E9/L    Lymphocytes Absolute 1.94 1.50 - 4.00 E9/L    Monocytes Absolute 0.46 0.10 - 0.95 E9/L    Eosinophils Absolute 0.32 0.05 - 0.50 E9/L    Basophils Absolute 0.05 0.00 - 0.20 E9/L   Comprehensive Metabolic Panel w/ Reflex to MG   Result Value Ref Range    Sodium 135 132 - 146 mmol/L    Potassium reflex Magnesium 4.0 3.5 - 5.0 mmol/L    Chloride 100 98 - 107 mmol/L    CO2 25 22 - 29 mmol/L    Anion Gap 10 7 - 16 mmol/L    Glucose 90 74 - 99 mg/dL    BUN 11 6 - 20 mg/dL    CREATININE 0.7 0.5 - 1.0 mg/dL    GFR Non-African American >60 >=60 mL/min/1.73    GFR African American >60     Calcium 9.1 8.6 - 10.2 mg/dL    Total Protein 7.0 6.4 - 8.3 g/dL    Albumin 4.5 3.5 - 5.2 g/dL    Total Bilirubin 0.4 0.0 - 1.2 mg/dL    Alkaline Phosphatase 124 (H) 35 - 104 U/L    ALT 22 0 - 32 U/L    AST 18 0 - 31 U/L   Urinalysis with Microscopic   Result Value Ref Range    Color, UA Yellow Straw/Yellow    Clarity, UA Clear Clear    Glucose, Ur Negative Negative mg/dL    Bilirubin Urine Negative Negative    Ketones, Urine Negative Negative mg/dL    Specific Gravity, UA 1.015 1.005 - 1.030    Blood, Urine Negative Negative    pH, UA 7.5 5.0 - 9.0    Protein, UA Negative Negative mg/dL    Urobilinogen, Urine 0.2 <2.0 E.U./dL    Nitrite, Urine Negative Negative    Leukocyte Esterase, Urine TRACE (A) Negative    WBC, UA 0-1 0 - 5 /HPF    RBC, UA NONE 0 - 2 /HPF    Epithelial Cells, UA RARE /HPF    Bacteria, UA RARE (A) None Seen /HPF   Pregnancy, Urine   Result Value Ref Range    HCG(Urine) Pregnancy Test NEGATIVE NEGATIVE   POC Pregnancy Urine Qual   Result Value Ref Range    HCG, Urine, POC Negative Negative    Lot Number [id number]     Positive QC Pass/Fail Pass     Negative QC Pass/Fail Pass      Imaging: All Radiology results interpreted by Radiologist unless otherwise noted. CT Head WO Contrast   Final Result   No acute intracranial abnormality. ED Course / Medical Decision Making     Medications   0.9 % sodium chloride bolus (500 mLs IntraVENous New Bag 6/24/22 1214)   meclizine (ANTIVERT) tablet 25 mg (25 mg Oral Given 6/24/22 1154)   ondansetron (ZOFRAN-ODT) disintegrating tablet 4 mg (4 mg Oral Given 6/24/22 1154)        Re-examination:  6/24/22       Time: 4792  Patients condition has improved and is currently asymptomatic. Patient able to sit on the side of the bed. Able to stand on her own. Romberg test negative. No swaying or spinning. Consult(s):   None    Procedure(s):   none    MDM:   Work-up essentially negative in the ED. Positive resolution of all symptoms with Antivert. Ready to go home. Patient will be discharged with Antivert, but was given verbal instructions that she really needs to increase her fluid intake. Plan of Care/Counseling:  Physician Assistant on duty reviewed today's visit with the patient in addition to providing specific details for the plan of care and counseling regarding the diagnosis and prognosis. Questions are answered at this time and are agreeable with the plan. ASSESSMENT     1.  Benign paroxysmal positional vertigo, unspecified laterality Improving     PLAN   Discharged home.  Patient condition is good    New Medications     New Prescriptions    MECLIZINE (ANTIVERT) 25 MG TABLET    Take 1 tablet by mouth 3 times daily as needed for Dizziness     Electronically signed by SRIKANTH Hemphill   DD: 6/24/22  **This report was transcribed using voice recognition software. Every effort was made to ensure accuracy; however, inadvertent computerized transcription errors may be present.   END OF ED PROVIDER NOTE       Kaylie Hemphill  06/24/22 0140

## 2022-06-26 LAB — URINE CULTURE, ROUTINE: NORMAL

## 2022-08-01 ENCOUNTER — HOSPITAL ENCOUNTER (EMERGENCY)
Age: 22
Discharge: HOME | End: 2022-08-01
Payer: COMMERCIAL

## 2022-08-01 VITALS
DIASTOLIC BLOOD PRESSURE: 85 MMHG | OXYGEN SATURATION: 95 % | TEMPERATURE: 98.1 F | SYSTOLIC BLOOD PRESSURE: 132 MMHG | BODY MASS INDEX: 44.63 KG/M2 | WEIGHT: 260 LBS | HEART RATE: 91 BPM | RESPIRATION RATE: 18 BRPM

## 2022-08-01 DIAGNOSIS — R22.0 FACIAL SWELLING: Primary | ICD-10-CM

## 2022-08-01 PROCEDURE — 99283 EMERGENCY DEPT VISIT LOW MDM: CPT

## 2022-08-01 RX ORDER — HYDROXYZINE PAMOATE 25 MG/1
25 CAPSULE ORAL 3 TIMES DAILY PRN
Qty: 15 CAPSULE | Refills: 0 | Status: SHIPPED | OUTPATIENT
Start: 2022-08-01 | End: 2022-08-06

## 2022-08-01 RX ORDER — FAMOTIDINE 20 MG/1
20 TABLET, FILM COATED ORAL 2 TIMES DAILY
Qty: 10 TABLET | Refills: 0 | Status: SHIPPED | OUTPATIENT
Start: 2022-08-01 | End: 2022-08-06

## 2022-08-01 RX ORDER — PREDNISONE 20 MG/1
TABLET ORAL
Qty: 18 TABLET | Refills: 0 | Status: SHIPPED | OUTPATIENT
Start: 2022-08-01 | End: 2022-08-11

## 2022-08-01 NOTE — ED PROVIDER NOTES
[address]  Department of Emergency Medicine   ED  Encounter Note  Admit Date/RoomTime: 2022  8:44 AM  ED Room: 15/15  NAME: Beba Light  : 2000  MRN: [id number]     Chief Complaint:  Facial Swelling (ONSET 0100 LAST NIGHT [address] TEST sAT DENIES PAIN OR SOB )    HISTORY OF PRESENT ILLNESS        Beba Light is a 20 y.o. female who presents to the ED by private vehicle for facial swelling that started at 0100 this morning. She denies any injury. She states that she tested positive for COVID 2 days ago. She had cough and body aches. Multiple other family members tested positive for COVID at home as well. She started taking Vitamin C and Zinc OTC two days ago. She has never taken that brand of either medication in the past. She denies any pain to the face. She reports most of the swelling to the right side of the face, under the right eye and the b/l ears. She denies itching. She denies difficulty w/breathing or swallowing. Nothing make her symptoms better or worse. She denies chance of pregnancy. She denies any leg swelling. ROS   Pertinent positives and negatives are stated within HPI, all other systems reviewed and are negative. Past Medical History:  has a past medical history of Autoimmune disorder ([address] BMI 45.0-49.9, adult ([address] Bronchitis,  delivery delivered, Closed displaced fracture of medial cuneiform of right foot with delayed healing, Diabetes mellitus ([address] Disease of blood and blood forming organ, History of  delivery affecting pregnancy, and Hypertension. Surgical History:  has a past surgical history that includes  section; Arm Surgery (Left);  section (N/A, 7/15/2020); and Foot surgery (Right, 3/17/2022). Social History:  reports that she has been smoking. She has a 1.25 pack-year smoking history.  She has never used smokeless tobacco. She reports that she does not currently use drugs after having used the following drugs: Marijuana Trayalondra Dela Cruz). She reports that she does not drink alcohol. Family History: family history includes Heart Disease in her maternal grandfather; Thyroid Disease in her mother. Allergies: Aspirin    PHYSICAL EXAM   Oxygen Saturation Interpretation: Normal on room air analysis. ED Triage Vitals [08/01/22 0827]   BP Temp Temp Source Heart Rate Resp SpO2 Height Weight   132/85 98.1 °F (36.7 °C) Oral (!) 110 18 97 % -- 260 lb (117.9 kg)         Physical Exam  Constitutional/General: Alert and oriented x3, well appearing, non toxic  HEENT:  NC/NT. PERRLA,  Airway patent. There is mild swelling to the right side of the face, just under the right eye and the b/l ears. No erythema. EOMs intact b/l. No pain to percussion over the dentition. No posterior pharyngeal swelling, no oral swelling. No swelling of the lips or tongue. Patient swallowing w/o difficulty. Neck: Supple, full ROM, non tender to palpation in the midline, no stridor, no crepitus, no meningeal signs  Respiratory: Lungs clear to auscultation bilaterally, no wheezes, rales, or rhonchi. Not in respiratory distress  CV:  Regular rate. Regular rhythm. No murmurs, gallops, or rubs. 2+ distal pulses  Chest: No chest wall tenderness  GI:  Abdomen Soft, Non tender, Non distended. +BS. No rebound, guarding, or rigidity. No pulsatile masses. Musculoskeletal: Moves all extremities x 4. Warm and well perfused, no clubbing, cyanosis, or edema. Capillary refill <3 seconds. 2+ dorsalis pedis pulses b/l. No pitting edema b/l. Integument: skin warm and dry. No rashes.    Lymphatic: no lymphadenopathy noted  Neurologic: GCS 15, no focal deficits, symmetric strength 5/5 in the upper and lower extremities bilaterally  Psychiatric: Normal Affect    Lab / Imaging Results   (All laboratory and radiology results have been personally reviewed by myself)  Labs:  No results found for this visit on 08/01/22. Imaging: All Radiology results interpreted by Radiologist unless otherwise noted. No orders to display       ED Course / Medical Decision Making   Medications - No data to display         Consult(s):   None    Procedure(s):   None    MDM:   patient w/non specific facial swelling. No oral swelling. Patient swallowing w/o difficulty. No difficulty breathing. She appears well. Likely d/t Zinc. Advised to d/c both new medications. Started on Vistaril, prednisone and Pepcid. Advised to return to the ER if worse in any way. Otherwise to f/u w/PCP. Plan of Care/Counseling:  SRIKANTH Ghotra reviewed today's visit with the patient in addition to providing specific details for the plan of care and counseling regarding the diagnosis and prognosis. Questions are answered at this time and are agreeable with the plan. ASSESSMENT     1. Facial swelling      PLAN   Discharged home. Patient condition is good    New Medications     New Prescriptions    FAMOTIDINE (PEPCID) 20 MG TABLET    Take 1 tablet by mouth in the morning and 1 tablet before bedtime. Do all this for 5 days. HYDROXYZINE PAMOATE (VISTARIL) 25 MG CAPSULE    Take 1 capsule by mouth 3 times daily as needed for Itching    PREDNISONE (DELTASONE) 20 MG TABLET    Sig.: Take 60mg  Po qd x 3 days, then 40mg po qd x3 days, then 20mg po qd x 3 days. QS x 9 days     Electronically signed by SRIKANTH Ghotra   DD: 8/1/22  **This report was transcribed using voice recognition software. Every effort was made to ensure accuracy; however, inadvertent computerized transcription errors may be present.   END OF ED PROVIDER NOTE       Kaylie Ghotra  08/01/22 3327

## 2022-12-28 ENCOUNTER — PATIENT MESSAGE (OUTPATIENT)
Dept: FAMILY MEDICINE CLINIC | Age: 22
End: 2022-12-28

## 2022-12-28 ENCOUNTER — HOSPITAL ENCOUNTER (EMERGENCY)
Age: 22
Discharge: HOME | End: 2022-12-28
Payer: COMMERCIAL

## 2022-12-28 VITALS
OXYGEN SATURATION: 100 % | DIASTOLIC BLOOD PRESSURE: 86 MMHG | WEIGHT: 275 LBS | TEMPERATURE: 98.4 F | BODY MASS INDEX: 46.95 KG/M2 | SYSTOLIC BLOOD PRESSURE: 145 MMHG | HEART RATE: 88 BPM | RESPIRATION RATE: 18 BRPM | HEIGHT: 64 IN

## 2022-12-28 DIAGNOSIS — K08.89 PAIN, DENTAL: Primary | ICD-10-CM

## 2022-12-28 PROCEDURE — 99211 OFF/OP EST MAY X REQ PHY/QHP: CPT

## 2022-12-28 RX ORDER — HYDROCODONE BITARTRATE AND ACETAMINOPHEN 5; 325 MG/1; MG/1
1 TABLET ORAL EVERY 6 HOURS PRN
Qty: 12 TABLET | Refills: 0 | Status: SHIPPED | OUTPATIENT
Start: 2022-12-28 | End: 2022-12-31

## 2022-12-28 RX ORDER — ACETAMINOPHEN 500 MG
1000 TABLET ORAL EVERY 6 HOURS PRN
COMMUNITY

## 2022-12-28 RX ORDER — PENICILLIN V POTASSIUM 500 MG/1
500 TABLET ORAL 4 TIMES DAILY
Qty: 40 TABLET | Refills: 0 | Status: SHIPPED | OUTPATIENT
Start: 2022-12-28 | End: 2023-01-07

## 2022-12-28 ASSESSMENT — PAIN - FUNCTIONAL ASSESSMENT: PAIN_FUNCTIONAL_ASSESSMENT: 0-10

## 2022-12-28 ASSESSMENT — PAIN DESCRIPTION - LOCATION: LOCATION: TEETH

## 2022-12-28 ASSESSMENT — PAIN DESCRIPTION - ORIENTATION: ORIENTATION: RIGHT;UPPER

## 2022-12-28 ASSESSMENT — PAIN DESCRIPTION - ONSET: ONSET: GRADUAL

## 2022-12-28 ASSESSMENT — PAIN SCALES - GENERAL: PAINLEVEL_OUTOF10: 10

## 2022-12-28 ASSESSMENT — PAIN DESCRIPTION - DESCRIPTORS: DESCRIPTORS: ACHING;THROBBING;BURNING

## 2022-12-28 ASSESSMENT — PAIN DESCRIPTION - PAIN TYPE: TYPE: ACUTE PAIN

## 2022-12-28 ASSESSMENT — PAIN DESCRIPTION - FREQUENCY: FREQUENCY: CONTINUOUS

## 2023-01-03 ENCOUNTER — OFFICE VISIT (OUTPATIENT)
Dept: FAMILY MEDICINE CLINIC | Age: 23
End: 2023-01-03
Payer: COMMERCIAL

## 2023-01-03 VITALS
DIASTOLIC BLOOD PRESSURE: 88 MMHG | HEART RATE: 98 BPM | BODY MASS INDEX: 46.69 KG/M2 | OXYGEN SATURATION: 97 % | WEIGHT: 272 LBS | SYSTOLIC BLOOD PRESSURE: 132 MMHG

## 2023-01-03 DIAGNOSIS — E78.2 MIXED HYPERCHOLESTEROLEMIA AND HYPERTRIGLYCERIDEMIA: Primary | ICD-10-CM

## 2023-01-03 DIAGNOSIS — E55.9 HYPOVITAMINOSIS D: ICD-10-CM

## 2023-01-03 DIAGNOSIS — E66.01 MORBID OBESITY WITH BMI OF 45.0-49.9, ADULT (HCC): ICD-10-CM

## 2023-01-03 DIAGNOSIS — F17.210 CIGARETTE NICOTINE DEPENDENCE WITHOUT COMPLICATION: ICD-10-CM

## 2023-01-03 DIAGNOSIS — D69.6 THROMBOCYTOPENIA (HCC): ICD-10-CM

## 2023-01-03 DIAGNOSIS — B37.31 MONILIAL VAGINITIS: ICD-10-CM

## 2023-01-03 PROCEDURE — 4004F PT TOBACCO SCREEN RCVD TLK: CPT | Performed by: FAMILY MEDICINE

## 2023-01-03 PROCEDURE — G8417 CALC BMI ABV UP PARAM F/U: HCPCS | Performed by: FAMILY MEDICINE

## 2023-01-03 PROCEDURE — G8427 DOCREV CUR MEDS BY ELIG CLIN: HCPCS | Performed by: FAMILY MEDICINE

## 2023-01-03 PROCEDURE — G8484 FLU IMMUNIZE NO ADMIN: HCPCS | Performed by: FAMILY MEDICINE

## 2023-01-03 PROCEDURE — 99214 OFFICE O/P EST MOD 30 MIN: CPT | Performed by: FAMILY MEDICINE

## 2023-01-03 RX ORDER — CLOTRIMAZOLE 1 %
CREAM WITH APPLICATOR VAGINAL
Qty: 45 G | Refills: 0 | Status: SHIPPED | OUTPATIENT
Start: 2023-01-03 | End: 2023-01-10

## 2023-01-03 ASSESSMENT — PATIENT HEALTH QUESTIONNAIRE - PHQ9
2. FEELING DOWN, DEPRESSED OR HOPELESS: 0
SUM OF ALL RESPONSES TO PHQ QUESTIONS 1-9: 0
SUM OF ALL RESPONSES TO PHQ9 QUESTIONS 1 & 2: 0
SUM OF ALL RESPONSES TO PHQ QUESTIONS 1-9: 0
SUM OF ALL RESPONSES TO PHQ QUESTIONS 1-9: 0
1. LITTLE INTEREST OR PLEASURE IN DOING THINGS: 0
SUM OF ALL RESPONSES TO PHQ QUESTIONS 1-9: 0

## 2023-01-03 NOTE — PROGRESS NOTES
OFFICE PROGRESS NOTE      SUBJECTIVE:        Patient ID:   Hola Light is a 20 y.o. female who presents for   Chief Complaint   Patient presents with    Vaginitis    Other     Clearance for dental procedure           HPI:     RECHECK CHOLESTEROL AND HYPOVITAMINOSIS D.  WAS SEEN AT [facility] ER ON 12/28/2022 FOR DENTAL INFECTION. WAS PLACED ON ANTIBIOTIC AND NOW HAS DEVELOPED YEAST INFECTION. ALSO SOME TEETH NEEDS TO BE PULLED. WILL NEED PRE OP CLEARANCE.  NOT WATCHING DIET GOOD. NO  EXERCISE. TAKING NO MEDICATIONS REGULARLY. Prior to Admission medications    Medication Sig Start Date End Date Taking? Authorizing Provider   clotrimazole (GYNE-LOTRIMIN) 1 % vaginal cream Place vaginally 2 times daily.  1/3/23 1/10/23 Yes Guanaco Zendejas MD   vitamin D (ERGOCALCIFEROL) 1.25 MG (19585 UT) CAPS capsule Take 1 capsule by mouth once a week  Patient not taking: No sig reported 3/3/22   Guanaco Zendejas MD     Social History     Socioeconomic History    Marital status: Single   Tobacco Use    Smoking status: Some Days     Years: 5.00     Types: Cigarettes    Smokeless tobacco: Never   Vaping Use    Vaping Use: Former    Substances: Nicotine    Devices: Refillable tank   Substance and Sexual Activity    Alcohol use: Never    Drug use: Not Currently     Types: Marijuana Marcella Travis)     Comment: last smoled 2016    Sexual activity: Yes     Partners: Male     Social Determinants of Health     Financial Resource Strain: Low Risk     Difficulty of Paying Living Expenses: Not hard at all   Food Insecurity: No Food Insecurity    Worried About Running Out of Food in the Last Year: Never true    [address] in the Last Year: Never true       I have reviewed Alysa's allergies, medications, problem list, medical, social and family history and have updated as needed in the electronic medical record  Review Of Systems:    Skin: no abnormal pigmentation, rash, scaling, itching, masses, hair or nail changes  Eyes: no blurring, diplopia, or eye pain  Ears/Nose/Throat: no hearing loss, tinnitus, vertigo, nosebleed, nasal congestion, rhinorrhea, sore throat  Respiratory: no cough, pleuritic chest pain, dyspnea, or wheezing  Cardiovascular: no chest pain, angina, dyspnea on exertion, orthopnea, PND, palpitations, or claudication  Gastrointestinal: no nausea, vomiting, heartburn, diarrhea, constipation, bloating,  abdominal pain, or blood per rectum. Appetite is good  Genitourinary: VAGINAL BURNING AND ITCHING. Musculoskeletal: Ambulating well  Neurologic: no paralysis, paresis, paresthesia, seizures, tremors, or headaches  Hematologic/Lymphatic/Immunologic: no anemia, abnormal bleeding/bruising, fever, chills, night sweats, swollen glands, or unexplained weight loss  Endocrine: no heat or cold intolerance and no polyphagia, polydipsia, or polyuria        OBJECTIVE:     VS:  Wt Readings from Last 3 Encounters:   01/03/23 272 lb (123.4 kg)   12/28/22 275 lb (124.7 kg)   08/01/22 260 lb (117.9 kg)     Temp Readings from Last 3 Encounters:   12/28/22 98.4 °F (36.9 °C) (Infrared)   08/01/22 98.1 °F (36.7 °C) (Oral)   06/24/22 97.8 °F (36.6 °C) (Oral)     BP Readings from Last 3 Encounters:   01/03/23 132/88   12/28/22 (!) 145/86   08/01/22 132/85        General appearance: Alert, Awake, Oriented times 3, no distress  Skin: Warm and dry  Head: Normocephalic. No masses, lesions or tenderness noted  Eyes: Conjunctivae appear normal. PERLE  Ears: External ears normal  Nose/Sinuses: Nares normal. Septum midline. Mucosa normal. No drainage  Oropharynx: Oropharynx clear with no exudate seen  Neck: Neck supple. No jugular venous distension, lymphadenopathy or thyromegaly Trachea midline  Chest:  Normal. Movements are Normal and Equal.  Lungs: Lungs clear to auscultation bilaterally. No ronchi, crackles or wheezes  Heart: S1 S2  Regular rate and rhythm. No rub, murmur or gallop  Abdomen: Abdomen soft, non-tender.  BS normal. No masses, organomegaly. Back: Grossly Normal and Equal. DTR are Normal. SLR is Normal.  Extremities: Arthritic changes are noted. Movements are limited. Pedal pulses are normal.  Musculoskeletal: Muscular strength appears intact. No joint effusion, tenderness, swelling or warmth  Neuro:  No focal motor or sensory deficits        ASSESSMENT     Patient Active Problem List    Diagnosis Date Noted    Mixed hypercholesterolemia and hypertriglyceridemia 03/03/2022    Hypovitaminosis D 03/03/2022    Right foot pain 02/23/2022    Cigarette nicotine dependence without complication 04/17/6892    Closed displaced fracture of medial cuneiform of right foot with delayed healing 01/13/2022    Lisfranc's sprain, right, initial encounter 01/13/2022    Difficulty walking 01/13/2022    Morbid obesity with BMI of 45.0-49.9, adult ([facility] 04/01/2020        Diagnosis:     ICD-10-CM    1. Mixed hypercholesterolemia and hypertriglyceridemia  E78.2 Comprehensive Metabolic Panel     Lipid Panel      2. Hypovitaminosis D  E55.9 Vitamin D 25 Hydroxy      3. Morbid obesity with BMI of 45.0-49.9, adult ([facility]  E66.01     Z68.42       4. Cigarette nicotine dependence without complication  Q18.906       5. Thrombocytopenia (HCC)  D69.6 CBC with Auto Differential      6. Monilial vaginitis  B37.31           PLAN:           Patient Instructions   LOW CARBOHYDRATE FOR  WEIGHT CONTROL. LOW FAT DIET FOR CHOLESTEROL CONTROL. TAKE VITAMIN D-3 12755 UNITS WEEKLY FOR VITAMIN DEFICIENCY. INSERT GYNE LOTRIMIN CREAM VAGINALLY 2 TIMES A DAY TILL IMPROVED. ADVISED TO QUIT SMOKING . REGULAR WALKING ADVISED. ADVISED WEIGHT REDUCTION. FASTING FOR LAB WORK ONE MORNING. NEXT APPOINTMENT IN 2 WEEKS FOR PRE OP EVALUATION. Return in about 2 weeks (around 1/17/2023) for PRE OP. I have reviewed my findings and recommendations with Deonna Washington.     Electronically signed by Juvencio Saab MD on 1/3/23 at 4:52 PM EST

## 2023-01-03 NOTE — TELEPHONE ENCOUNTER
From: Pepito Toribio  Sent: 1/2/2023 6:32 PM EST  To: Autumn Canales Clinical Staff  Subject: Tooth ache     Sorry to bug you but I went to the urgent care on Wednesday for my tooth ache to get an antibiotic like my dentist said and she gave me 500mg of penicillin and Im pretty sure I now have an yeast infection do I have to make an appointment or can yous just call me somthing in I tried over the counter stuff and it helps but not Cureing it I did buy Acidophilus!  Just let me know please Im not allowed to have aspirin if that matters cell number is [phone] thank you

## 2023-01-03 NOTE — PATIENT INSTRUCTIONS
LOW CARBOHYDRATE FOR  WEIGHT CONTROL. LOW FAT DIET FOR CHOLESTEROL CONTROL. TAKE VITAMIN D-3 23780 UNITS WEEKLY FOR VITAMIN DEFICIENCY. INSERT GYNE LOTRIMIN CREAM VAGINALLY 2 TIMES A DAY TILL IMPROVED. ADVISED TO QUIT SMOKING . REGULAR WALKING ADVISED. ADVISED WEIGHT REDUCTION. FASTING FOR LAB WORK ONE MORNING. NEXT APPOINTMENT IN 2 WEEKS FOR PRE OP EVALUATION.

## 2023-01-12 DIAGNOSIS — E55.9 HYPOVITAMINOSIS D: ICD-10-CM

## 2023-01-12 DIAGNOSIS — D69.6 THROMBOCYTOPENIA (HCC): ICD-10-CM

## 2023-01-12 DIAGNOSIS — E78.2 MIXED HYPERCHOLESTEROLEMIA AND HYPERTRIGLYCERIDEMIA: ICD-10-CM

## 2023-01-12 LAB
ALBUMIN SERPL-MCNC: 4.5 G/DL (ref 3.5–5.2)
ALP BLD-CCNC: 123 U/L (ref 35–104)
ALT SERPL-CCNC: 18 U/L (ref 0–32)
ANION GAP SERPL CALCULATED.3IONS-SCNC: 14 MMOL/L (ref 7–16)
AST SERPL-CCNC: 14 U/L (ref 0–31)
BASOPHILS ABSOLUTE: 0.07 E9/L (ref 0–0.2)
BASOPHILS RELATIVE PERCENT: 0.7 % (ref 0–2)
BILIRUB SERPL-MCNC: <0.2 MG/DL (ref 0–1.2)
BUN BLDV-MCNC: 13 MG/DL (ref 6–20)
CALCIUM SERPL-MCNC: 9.8 MG/DL (ref 8.6–10.2)
CHLORIDE BLD-SCNC: 102 MMOL/L (ref 98–107)
CHOLESTEROL, TOTAL: 219 MG/DL (ref 0–199)
CO2: 25 MMOL/L (ref 22–29)
CREAT SERPL-MCNC: 0.7 MG/DL (ref 0.5–1)
EOSINOPHILS ABSOLUTE: 0.44 E9/L (ref 0.05–0.5)
EOSINOPHILS RELATIVE PERCENT: 4.4 % (ref 0–6)
GFR SERPL CREATININE-BSD FRML MDRD: >60 ML/MIN/1.73
GLUCOSE BLD-MCNC: 83 MG/DL (ref 74–99)
HCT VFR BLD CALC: 39.4 % (ref 34–48)
HDLC SERPL-MCNC: 37 MG/DL
HEMOGLOBIN: 13 G/DL (ref 11.5–15.5)
IMMATURE GRANULOCYTES #: 0.03 E9/L
IMMATURE GRANULOCYTES %: 0.3 % (ref 0–5)
LDL CHOLESTEROL CALCULATED: ABNORMAL MG/DL (ref 0–99)
LYMPHOCYTES ABSOLUTE: 3.11 E9/L (ref 1.5–4)
LYMPHOCYTES RELATIVE PERCENT: 31 % (ref 20–42)
MCH RBC QN AUTO: 26.6 PG (ref 26–35)
MCHC RBC AUTO-ENTMCNC: 33 % (ref 32–34.5)
MCV RBC AUTO: 80.7 FL (ref 80–99.9)
MONOCYTES ABSOLUTE: 0.68 E9/L (ref 0.1–0.95)
MONOCYTES RELATIVE PERCENT: 6.8 % (ref 2–12)
NEUTROPHILS ABSOLUTE: 5.69 E9/L (ref 1.8–7.3)
NEUTROPHILS RELATIVE PERCENT: 56.8 % (ref 43–80)
PDW BLD-RTO: 13 FL (ref 11.5–15)
PLATELET # BLD: 149 E9/L (ref 130–450)
PMV BLD AUTO: 13.5 FL (ref 7–12)
POTASSIUM SERPL-SCNC: 4.2 MMOL/L (ref 3.5–5)
RBC # BLD: 4.88 E12/L (ref 3.5–5.5)
SODIUM BLD-SCNC: 141 MMOL/L (ref 132–146)
TOTAL PROTEIN: 6.9 G/DL (ref 6.4–8.3)
TRIGL SERPL-MCNC: 549 MG/DL (ref 0–149)
VITAMIN D 25-HYDROXY: 28 NG/ML (ref 30–100)
VLDLC SERPL CALC-MCNC: ABNORMAL MG/DL
WBC # BLD: 10 E9/L (ref 4.5–11.5)

## 2023-01-13 NOTE — RESULT ENCOUNTER NOTE
VITAMIN D LEVEL LOW. TAKE VITAMIN D-3 2000 UNITS DAILY. LDL CHOLESTEROL, TRIGLYCERIDES TOO LEVEL ARE HIGH. RECOMMEND:  LOW SALT, LOW CARB. AND LOW FAT DIET. REGULAR EXERCISE. DISCUSS NEXT VISIT. PLEASE ACKNOWLEDGE RECEIPT OF INFORMATION BY REPLYING THE MESSAGE. THANKS.

## 2023-01-17 ENCOUNTER — OFFICE VISIT (OUTPATIENT)
Dept: FAMILY MEDICINE CLINIC | Age: 23
End: 2023-01-17
Payer: COMMERCIAL

## 2023-01-17 VITALS
WEIGHT: 271 LBS | BODY MASS INDEX: 46.52 KG/M2 | DIASTOLIC BLOOD PRESSURE: 84 MMHG | SYSTOLIC BLOOD PRESSURE: 132 MMHG | HEART RATE: 98 BPM | OXYGEN SATURATION: 98 %

## 2023-01-17 DIAGNOSIS — E55.9 HYPOVITAMINOSIS D: ICD-10-CM

## 2023-01-17 DIAGNOSIS — Z01.818 PRE-OP EVALUATION: Primary | ICD-10-CM

## 2023-01-17 DIAGNOSIS — E78.2 MIXED HYPERCHOLESTEROLEMIA AND HYPERTRIGLYCERIDEMIA: ICD-10-CM

## 2023-01-17 DIAGNOSIS — E66.01 MORBID OBESITY WITH BMI OF 45.0-49.9, ADULT (HCC): ICD-10-CM

## 2023-01-17 PROCEDURE — G8427 DOCREV CUR MEDS BY ELIG CLIN: HCPCS | Performed by: FAMILY MEDICINE

## 2023-01-17 PROCEDURE — G8417 CALC BMI ABV UP PARAM F/U: HCPCS | Performed by: FAMILY MEDICINE

## 2023-01-17 PROCEDURE — 4004F PT TOBACCO SCREEN RCVD TLK: CPT | Performed by: FAMILY MEDICINE

## 2023-01-17 PROCEDURE — 99214 OFFICE O/P EST MOD 30 MIN: CPT | Performed by: FAMILY MEDICINE

## 2023-01-17 PROCEDURE — G8484 FLU IMMUNIZE NO ADMIN: HCPCS | Performed by: FAMILY MEDICINE

## 2023-01-17 RX ORDER — ERGOCALCIFEROL 1.25 MG/1
50000 CAPSULE ORAL WEEKLY
Qty: 12 CAPSULE | Refills: 1 | Status: SHIPPED | OUTPATIENT
Start: 2023-01-17

## 2023-01-17 RX ORDER — FENOFIBRATE 145 MG/1
145 TABLET, COATED ORAL DAILY
Qty: 90 TABLET | Refills: 1 | Status: SHIPPED | OUTPATIENT
Start: 2023-01-17

## 2023-01-17 NOTE — PATIENT INSTRUCTIONS
LOW CARBOHYDRATE FOR  WEIGHT CONTROL. LOW FAT DIET FOR CHOLESTEROL CONTROL. TAKE TRICOR 145 MG. DAILY FOR CHOLESTEROL CONTROL. TAKE VITAMIN D-3 75415 UNITS WEEKLY FOR VITAMIN DEFICIENCY. ADVISED TO QUIT SMOKING . REGULAR WALKING ADVISED. ADVISED WEIGHT REDUCTION. OK FOR SURGERY WITH MODERATE RISK FOR GENERAL ANESTHESIA. FASTING FOR LAB WORK PRIOR TO NEXT VISIT. Svetlana Magaña NEXT APPOINTMENT IN  2 MONTHS.

## 2023-01-17 NOTE — PROGRESS NOTES
PRE-OP  EVALUATION NOTE. HISTORY OF PRESENT ILLNESS:      The patient is a 20 y.o. female patient Malathi Enriquez MD who presents with PRE OP EVALUATION. DENTAL WORK PLANNED ON TAKING CARE OF DENTAL CARIES. CURRENTLY NO SYMPTOMS OR PROBLEM. Past Medical History:   Diagnosis Date    Autoimmune disorder ([address]     ITP- blood clotting disorder    BMI 45.0-49.9, adult ([address] 2020    Recommended consideration of growth at 32 weeks and weekly  testing per COVID 19 recommendations     Bronchitis      delivery delivered 7/15/2020    Closed displaced fracture of medial cuneiform of right foot with delayed healing 2022    Diabetes mellitus ([address]     borderline - diet control    Disease of blood and blood forming organ     blood clotting issue    History of  delivery affecting pregnancy 2020    Hypertension     diet control;  with pregnancy only           Past Surgical History:   Procedure Laterality Date    ARM SURGERY Left      SECTION      2 - c-sections     SECTION N/A 7/15/2020    REPEAT  SECTION performed by Talha Obando MD at [facility] OR    FOOT SURGERY Right 3/17/2022    OPEN TREATMENT MEDIAL CUNEIFORM FRACTURE RIGHT FOOT LISFRANC REPAIR RIGHT FOOT performed by Jean Gonzales DPM at [address] [place] [address] [facility]       Medications Prior to Admission:      Current Outpatient Medications:     vitamin D (ERGOCALCIFEROL) 1.25 MG (34652 UT) CAPS capsule, Take 1 capsule by mouth once a week, Disp: 12 capsule, Rfl: 1    fenofibrate (TRICOR) 145 MG tablet, Take 1 tablet by mouth daily, Disp: 90 tablet, Rfl: 1    Allergies:    Aspirin    Social History:    reports that she has been smoking cigarettes. She has never used smokeless tobacco. She reports that she does not currently use drugs after having used the following drugs: Marijuana [place]). She reports that she does not drink alcohol.     Family History:   family history includes Heart Disease in her maternal grandfather; Thyroid Disease in her mother. REVIEW OF SYSTEMS:  GENERAL:APPETITE-GOOD  EENT:NO SORE THROAT,EARACHE, HEADACHE OR CONGESTION  GI: BOWEL- NORMAL. NO NAUSEA  OR VOMITING. NO ABDOMINAL PAINS. : NO URINARY SYMPTOMS. MUSCULOSKELETAL: NO PAIN. MOVEMENTS ARE NORMAL. NEUROLOGY:NO DIZZINESS,PAIN OR OTHER SYMPTOMS    PHYSICAL EXAM:    Vitals:  /84   Pulse 98   Wt 271 lb (122.9 kg)   SpO2 98%   BMI 46.52 kg/m²     General:  Awake, alert, oriented X 3. Well developed, well nourished, well groomed. No apparent distress. HEENT:  Normocephalic, atraumatic. Pupils equal, round, reactive to light. No scleral icterus. No conjunctival injection. Normal lips, teeth, and gums. No nasal discharge. Neck:  Supple  Heart:  RRR, no murmurs, gallops, rubs  Lungs:  CTA bilaterally, bilat symmetrical expansion, no wheeze, rales, or rhonchi  Abdomen:   Bowel sounds present, soft, nontender, no masses, no organomegaly, no peritoneal signs  Extremities:  No clubbing, cyanosis, or edema  Skin:  Warm and dry, no open lesions or rash  Neuro:  Cranial nerves 2-12 intact, no focal deficits  Breast: deferred  Rectal: deferred  Genitalia:  deferred    LABS:    CBC:   Lab Results   Component Value Date/Time    WBC 10.0 01/12/2023 03:18 PM    RBC 4.88 01/12/2023 03:18 PM    HGB 13.0 01/12/2023 03:18 PM    HCT 39.4 01/12/2023 03:18 PM    MCV 80.7 01/12/2023 03:18 PM    MCH 26.6 01/12/2023 03:18 PM    MCHC 33.0 01/12/2023 03:18 PM    RDW 13.0 01/12/2023 03:18 PM     01/12/2023 03:18 PM    MPV 13.5 01/12/2023 03:18 PM     CMP:    Lab Results   Component Value Date/Time     01/12/2023 03:18 PM    K 4.2 01/12/2023 03:18 PM    K 4.0 06/24/2022 12:11 PM     01/12/2023 03:18 PM    CO2 25 01/12/2023 03:18 PM    BUN 13 01/12/2023 03:18 PM    CREATININE 0.7 01/12/2023 03:18 PM    GFRAA >60 06/24/2022 12:11 PM    LABGLOM >60 01/12/2023 03:18 PM    GLUCOSE 83 01/12/2023 03:18 PM    PROT 6.9 01/12/2023 03:18 PM    LABALBU 4.5 01/12/2023 03:18 PM    CALCIUM 9.8 01/12/2023 03:18 PM    BILITOT <0.2 01/12/2023 03:18 PM    ALKPHOS 123 01/12/2023 03:18 PM    AST 14 01/12/2023 03:18 PM    ALT 18 01/12/2023 03:18 PM       ASSESSMENT:     Diagnosis Orders   1. Pre-op evaluation        2. Mixed hypercholesterolemia and hypertriglyceridemia  Comprehensive Metabolic Panel    Lipid Panel      3. Hypovitaminosis D  Vitamin D 25 Hydroxy      4. Morbid obesity with BMI of 45.0-49.9, adult ([facility])                PLAN:  Patient Instructions   LOW CARBOHYDRATE FOR  WEIGHT CONTROL. LOW FAT DIET FOR CHOLESTEROL CONTROL. TAKE TRICOR 145 MG. DAILY FOR CHOLESTEROL CONTROL. TAKE VITAMIN D-3 31988 UNITS WEEKLY FOR VITAMIN DEFICIENCY. ADVISED TO QUIT SMOKING . REGULAR WALKING ADVISED. ADVISED WEIGHT REDUCTION. OK FOR SURGERY WITH MODERATE RISK FOR GENERAL ANESTHESIA. FASTING FOR LAB WORK PRIOR TO NEXT VISIT. Jeannette Jameson NEXT APPOINTMENT IN  2 MONTHS. Return for FOLLOW UP VISIT.        Note that over 50 minutes was spent in evaluation of the patient, review of the chart and pertinent records, discussion with family/staff, etc    Jose De Jesus Horne MD MD  10:47 AM  1/17/2023

## 2023-01-31 RX ORDER — AMOXICILLIN 500 MG/1
500 CAPSULE ORAL 3 TIMES DAILY
Qty: 21 CAPSULE | Refills: 0 | Status: SHIPPED | OUTPATIENT
Start: 2023-01-31 | End: 2023-02-07

## 2023-02-10 PROBLEM — S92.241G: Status: RESOLVED | Noted: 2022-01-13 | Resolved: 2023-02-10

## 2023-02-10 PROBLEM — R26.2 DIFFICULTY WALKING: Status: RESOLVED | Noted: 2022-01-13 | Resolved: 2023-02-10
